# Patient Record
Sex: MALE | Race: WHITE | NOT HISPANIC OR LATINO | Employment: OTHER | URBAN - METROPOLITAN AREA
[De-identification: names, ages, dates, MRNs, and addresses within clinical notes are randomized per-mention and may not be internally consistent; named-entity substitution may affect disease eponyms.]

---

## 2017-01-04 NOTE — TELEPHONE ENCOUNTER
Refused Prescriptions:                       Disp   Refills    PROCTOZONE-HC 2.5 % cream [Pharmacy Med Na*28 g   2        Sig: APPLY TO ANUS TWO TIMES DAILY  Refused By: ALIYAH HERNANDEZ  Reason for Refusal: Appt required, please call patient    Duplicate see refill encounter on 1-3-2016  Machelle  [phone] (home) [phone] (work)

## 2017-06-21 ENCOUNTER — OFFICE VISIT (OUTPATIENT)
Dept: FAMILY MEDICINE | Facility: CLINIC | Age: 67
End: 2017-06-21

## 2017-06-21 VITALS
DIASTOLIC BLOOD PRESSURE: 62 MMHG | HEART RATE: 55 BPM | TEMPERATURE: 97.9 F | OXYGEN SATURATION: 98 % | WEIGHT: 160.2 LBS | SYSTOLIC BLOOD PRESSURE: 100 MMHG | HEIGHT: 69 IN | BODY MASS INDEX: 23.73 KG/M2

## 2017-06-21 DIAGNOSIS — R30.0 DYSURIA: Primary | ICD-10-CM

## 2017-06-21 DIAGNOSIS — K62.89 ANAL PAIN: ICD-10-CM

## 2017-06-21 LAB
ALBUMIN (URINE): NORMAL MG/DL
APPEARANCE UR: CLEAR
BACTERIA, UR: NORMAL
BILIRUB UR QL: NORMAL
CASTS/LPF: NORMAL
COLOR UR: YELLOW
EP/HPF: NORMAL
GLUCOSE URINE: NORMAL MG/DL
HGB UR QL: NORMAL
KETONES UR QL: NORMAL MG/DL
LEUKOCYTE ESTERASE - QUEST: NORMAL
MISC.: NORMAL
NITRITE UR QL STRIP: NORMAL
PH UR STRIP: 5.5 PH (ref 5–7)
RBC, UR MICRO: NORMAL (ref ?–2)
SP. GRAVITY: 1.02
UROBILINOGEN UR QL STRIP: 0.2 EU/DL (ref 0.2–1)
WBC, UR MICRO: NORMAL (ref ?–2)

## 2017-06-21 PROCEDURE — 84153 ASSAY OF PSA TOTAL: CPT | Mod: 90 | Performed by: FAMILY MEDICINE

## 2017-06-21 PROCEDURE — 36415 COLL VENOUS BLD VENIPUNCTURE: CPT | Performed by: FAMILY MEDICINE

## 2017-06-21 PROCEDURE — 99213 OFFICE O/P EST LOW 20 MIN: CPT | Performed by: FAMILY MEDICINE

## 2017-06-21 PROCEDURE — 81001 URINALYSIS AUTO W/SCOPE: CPT | Performed by: FAMILY MEDICINE

## 2017-06-21 NOTE — PROGRESS NOTES
SUBJECTIVE: Sly Morton is a 66 year old male who complains of very slight urinary frequency and intermittent dysuria x 3 weeks, without flank pain, fever, chills, or abnormal penile discharge or bleeding. Pain seems right at head of penis    Patient Active Problem List   Diagnosis     Sciatica     Family history of diabetes mellitus     CATARACT NEC Right      Health Care Home     ACP (advance care planning)     Coronary artery disease involving native coronary artery of native heart without angina pectoris     Cardiomyopathy, ischemic     Hyperlipidemia     Mitral valve disorder     ST elevation myocardial infarction involving left anterior descending (LAD) coronary artery (H)     Past Medical History:   Diagnosis Date     Cardiomyopathy, ischemic      Coronary artery disease      Family history of diabetes mellitus     Father     Hyperlipidaemia      Mitral valve disorders     mitral regurgitation     Old myocardial infarction 5/04    anterior     Family History   Problem Relation Age of Onset     DIABETES Father      C.A.D. Father      Cancer - colorectal No family hx of      Prostate Cancer No family hx of      Social History     Social History     Marital status:      Spouse name: Yoni     Number of children: 2     Years of education: 18     Occupational History     [employer]     Social History Main Topics     Smoking status: Never Smoker     Smokeless tobacco: Never Used     Alcohol use No     Drug use: No     Sexual activity: Yes     Partners: Female     Other Topics Concern     Caffeine Concern Yes     2-4 cups daily     Special Diet Yes     low fat, low cholesterol     Exercise Yes     90 mins daily     Seat Belt Yes     Social History Narrative     Past Surgical History:   Procedure Laterality Date     C EYE EXAM ESTABLISHED PT  2003     C REPAIR DETACH RETINA,SCLERAL BUCKLE  1967    Right eye     HC KNEE SCOPE, DIAGNOSTIC  8/1/2011    Right knee arthroscopy with  partial medial meniscectomy     HEART CATH, ANGIOPLASTY  May 2004     intracoronary stent placement of mid LAD May 2004,     HEART CATH, ANGIOPLASTY  June 2004    elective staged stent of OM        Current Outpatient Prescriptions on File Prior to Visit:  atorvastatin (LIPITOR) 10 MG tablet Take 1 tablet (10 mg) by mouth daily   lisinopril (PRINIVIL,ZESTRIL) 5 MG tablet Take 1 tablet (5 mg) by mouth daily   ASPIRIN 81 MG OR TABS 1 TABLET DAILY   latanoprost (XALATAN) 0.005 % ophthalmic solution Place 1 drop into the right eye At Bedtime      No current facility-administered medications on file prior to visit.      Allergies: No known allergies    Immunization History   Administered Date(s) Administered     TD (ADULT, 7+) 08/03/1999, 03/26/2007     TDAP Vaccine (Boostrix) 08/13/2012        OBJECTIVE: Appears well, in no apparent distress.  Vital signs are normal. The abdomen is soft without tenderness, guarding, mass, rebound or organomegaly. No CVA tenderness or inguinal adenopathy noted. Urine dipstick shows negative for all components.  Micro exam: negative for WBC's or RBC's.     Genitals normal; both testes normal without tenderness, masses, hydroceles, varicoceles, erythema or swelling. Shaft normal, uncircumcised, meatus normal without discharge. No inguinal hernia noted. No inguinal lymphadenopathy.      ASSESSMENT:    Dysuria-normal exam, normal UA- may be urethral irritation     PLAN: observe for now, check PSA Call or return to clinic prn if these symptoms worsen or fail to improve as anticipated.

## 2017-06-21 NOTE — NURSING NOTE
Sly Morton is here today for pain in his penis and burning when he urinates.    Pre-Visit Screening :  Immunizations : up to date  Colon Screening : is due and to be scheduled by patient for later completion  Asthma Action Test/Plan : NA  PHQ9/GAD7 :  NA  Pulse - regular  My Chart - declines    CLASSIFICATION OF OVERWEIGHT AND OBESITY BY BMI                         Obesity Class           BMI(kg/m2)  Underweight                                    < 18.5  Normal                                         18.5-24.9  Overweight                                     25.0-29.9  OBESITY                     I                  30.0-34.9                              II                 35.0-39.9  EXTREME OBESITY             III                >40                             Patient's  BMI Body mass index is 23.66 kg/(m^2).  http://hin.nhlbi.nih.gov/menuplanner/menu.cgi  Questioned patient about current smoking habits.  Pt. has never smoked.  Shannen Mccoy, CMA

## 2017-06-21 NOTE — LETTER
[facility]  [facility] [address]  [place]  [address]    June 22, 2017            Sly Morton  [address] [address]                  Dear Sly Morton      LAB RESULTS:     The results of your recent PSA were NORMAL.  If you have any further questions or problems, please contact our office at [phone].          Eriberto Shetty MD

## 2017-06-21 NOTE — MR AVS SNAPSHOT
After Visit Summary   6/21/2017    Sly Morton    MRN: [id number]           Patient Information     Date Of Birth          1950        Visit Information        Provider Department      6/21/2017 12:15 PM Eriberto Shetty MD [facility]        Today's Diagnoses     Dysuria    -  1    Anal pain           Follow-ups after your visit        Your next 10 appointments already scheduled     Aug 08, 2017  7:45 AM CDT   LAB with RU LAB   [facility] ([facility])    [address]  [facility] [id number] [phone]           Patient must bring picture ID.  Patient should be prepared to give a urine specimen  Please do not eat 10-12 hours before your appointment if you are coming in fasting for labs on lipids, cholesterol, or glucose (sugar).  Pregnant women should follow their Care Team instructions. Water with medications is okay. Do not drink coffee or other fluids.   If you have concerns about taking  your medications, please ask at office or if scheduling via [employer], send a message by clicking on Secure Messaging, Message Your Care Team.            Aug 08, 2017  8:10 AM CDT   Return Visit with Delvin Dougherty PA-C   [facility] ([facility])    [address]  [facility] [id number] [phone]              Who to contact     If you have questions or need follow up information about today's clinic visit or your schedule please contact [facility] directly at [phone].  Normal or non-critical lab and imaging results will be communicated to you by MyChart, letter or phone within 4 business days after the clinic has received the results. If you do not hear from us within 7 days, please contact the clinic through MyChart or phone. If you have a critical or abnormal lab result, we will notify you by phone as soon  "as possible.  Submit refill requests through [employer] or call your pharmacy and they will forward the refill request to us. Please allow 3 business days for your refill to be completed.          Additional Information About Your Visit        Care EveryWhere ID     This is your Care EveryWhere ID. This could be used by other organizations to access your [place] medical records  [id number]        Your Vitals Were     Pulse Temperature Height Pulse Oximetry BMI (Body Mass Index)       55 97.9  F (36.6  C) (Oral) 1.753 m (5' 9\") 98% 23.66 kg/m2        Blood Pressure from Last 3 Encounters:   06/21/17 100/62   08/22/16 116/68   02/03/16 120/80    Weight from Last 3 Encounters:   06/21/17 72.7 kg (160 lb 3.2 oz)   08/22/16 72 kg (158 lb 12.8 oz)   08/11/15 69.9 kg (154 lb)              We Performed the Following     HCL PSA, SCREENING ([facility])     HCL URINALYSIS, ROUTINE     VENOUS COLLECTION          Today's Medication Changes          These changes are accurate as of: 6/21/17 12:52 PM.  If you have any questions, ask your nurse or doctor.               Start taking these medicines.        Dose/Directions    hydrocortisone 2.5 % cream   Commonly known as:  ANUSOL-HC   Used for:  Anal pain   Started by:  Eriberto Shetty MD        Apply to anus BID prn   Quantity:  30 g   Refills:  3            Where to get your medicines      These medications were sent to [id number] [place] [id number]     Phone:  [phone]     hydrocortisone 2.5 % cream                Primary Care Provider Office Phone # Fax #    Eriberto Shetty [phone]       [facility] [address] [facility] [address]  [facility] [address]        Equal Access to Services     BHARATHI JIMENEZ AH: Hadii aad ku hadasho Soomaali, waaxda luqadaha, qaybta kaalmada adeegyada, abhay orielly. So wa " [phone].    ATENCIÓN: Si mitch arellano, tiene a maldonado disposición servicios gratuitos de asistencia lingüística. Clarita erickson [phone].    We comply with applicable federal civil rights laws and [place] laws. We do not discriminate on the basis of race, color, national origin, age, disability sex, sexual orientation or gender identity.            Thank you!     Thank you for choosing [facility]  for your care. Our goal is always to provide you with excellent care. Hearing back from our patients is one way we can continue to improve our services. Please take a few minutes to complete the written survey that you may receive in the mail after your visit with us. Thank you!             Your Updated Medication List - Protect others around you: Learn how to safely use, store and throw away your medicines at www.disposemymeds.org.          This list is accurate as of: 6/21/17 12:52 PM.  Always use your most recent med list.                   Brand Name Dispense Instructions for use Diagnosis    aspirin 81 MG tablet      1 TABLET DAILY    Fever, unspecified       atorvastatin 10 MG tablet    LIPITOR    90 tablet    Take 1 tablet (10 mg) by mouth daily    Coronary artery disease involving native coronary artery of native heart without angina pectoris       hydrocortisone 2.5 % cream    ANUSOL-HC    30 g    Apply to anus BID prn    Anal pain       latanoprost 0.005 % ophthalmic solution    XALATAN     Place 1 drop into the right eye At Bedtime        lisinopril 5 MG tablet    PRINIVIL/ZESTRIL    90 tablet    Take 1 tablet (5 mg) by mouth daily    Coronary artery disease involving native coronary artery of native heart without angina pectoris

## 2017-06-22 LAB — ABBOTT PSA - QUEST: 0.7 NG/ML

## 2017-08-08 ENCOUNTER — OFFICE VISIT (OUTPATIENT)
Dept: CARDIOLOGY | Facility: CLINIC | Age: 67
End: 2017-08-08
Attending: INTERNAL MEDICINE
Payer: COMMERCIAL

## 2017-08-08 VITALS
OXYGEN SATURATION: 98 % | HEART RATE: 54 BPM | BODY MASS INDEX: 24.51 KG/M2 | SYSTOLIC BLOOD PRESSURE: 110 MMHG | HEIGHT: 69 IN | DIASTOLIC BLOOD PRESSURE: 68 MMHG | WEIGHT: 165.5 LBS

## 2017-08-08 DIAGNOSIS — I25.5 CARDIOMYOPATHY, ISCHEMIC: ICD-10-CM

## 2017-08-08 LAB
ANION GAP SERPL CALCULATED.3IONS-SCNC: 4 MMOL/L (ref 3–14)
BUN SERPL-MCNC: 17 MG/DL (ref 7–30)
CALCIUM SERPL-MCNC: 8.6 MG/DL (ref 8.5–10.1)
CHLORIDE SERPL-SCNC: 105 MMOL/L (ref 94–109)
CHOLEST SERPL-MCNC: 128 MG/DL
CO2 SERPL-SCNC: 30 MMOL/L (ref 20–32)
CREAT SERPL-MCNC: 0.82 MG/DL (ref 0.66–1.25)
GFR SERPL CREATININE-BSD FRML MDRD: NORMAL ML/MIN/1.7M2
GLUCOSE SERPL-MCNC: 92 MG/DL (ref 70–99)
HDLC SERPL-MCNC: 65 MG/DL
LDLC SERPL CALC-MCNC: 54 MG/DL
NONHDLC SERPL-MCNC: 63 MG/DL
POTASSIUM SERPL-SCNC: 4.5 MMOL/L (ref 3.4–5.3)
SODIUM SERPL-SCNC: 139 MMOL/L (ref 133–144)
TRIGL SERPL-MCNC: 46 MG/DL

## 2017-08-08 PROCEDURE — 80048 BASIC METABOLIC PNL TOTAL CA: CPT | Performed by: INTERNAL MEDICINE

## 2017-08-08 PROCEDURE — 36415 COLL VENOUS BLD VENIPUNCTURE: CPT | Performed by: INTERNAL MEDICINE

## 2017-08-08 PROCEDURE — 80061 LIPID PANEL: CPT | Performed by: INTERNAL MEDICINE

## 2017-08-08 PROCEDURE — 99214 OFFICE O/P EST MOD 30 MIN: CPT | Performed by: PHYSICIAN ASSISTANT

## 2017-08-08 NOTE — PROGRESS NOTES
HISTORY OF PRESENT ILLNESS:  I had the pleasure of meeting Sly Morton today in the Cardiology Clinic for followup.  Sly is a very pleasant, 66-year-old gentleman who is a patient of Dr. Mensah.  He is an  in the [employer].      Sly is a very pleasant, 66-year-old man who has a history of coronary artery disease dating back to 2004 when he presented with an anterior wall myocardial infarction.  He underwent stenting of his mid LAD.  In a staged fashion, he underwent stenting of the OM.  Ejection fraction was 40% with 2-3+ mitral regurgitation.  Most recent echocardiogram 08/15/2016 showed ejection fraction of 40%-45% with trace to mild mitral regurgitation.  His last evaluation of his coronary anatomy was with a stress nuclear scan in 08/2012.  This demonstrated a small fixed anteroapical defect consistent with a nontransmural infarct, and there was inferior diaphragmatic attenuation.  Ejection fraction was estimated to be 47%.      Sly was last seen by Dr. Mensah on 08/22/2016.  At that time, he was doing very well.  It was noted his LDL was trending upward at that time.      Sly presents today for his annual followup.  He tells me he continues to do very well from a cardiac standpoint without any chest pain, shortness of breath, edema, lightheadedness, dizziness, syncope.  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  He says that in the school year, he walks approximately 20,000 steps per day.  In the summer, he is less active.  He notes he has put on about 5 pounds over the summer due to this.  He works hard on a healthy diet, though does allow some treats and snacks at some times.      Sly was seen by his Primary Care provider this summer.      PHYSICAL EXAMINATION:   VITAL SIGNS:   Blood pressure 110/68, pulse 54, weight 165 pounds 8 ounces, BMI 24.49.   GENERAL:  A 66-year-old gentleman, thin, in no apparent distress.   NECK:  Carotid pulses full and equal.  No bruit.  JVP is normal.   LUNGS:  Clear.   CARDIAC:  Regular S1, S2.  No significant murmur is appreciated.   ABDOMEN:  Thin, soft.  Bowel sounds positive and nontender.   EXTREMITIES:  Warm without pitting edema.   NEUROLOGIC:  Alert and oriented x3.  No focal deficits.      DIAGNOSTICS:      1.  08/08/2017 fasting lipid panel:  Total cholesterol 128, HDL 65, LDL 54, triglycerides 46.   2.  08/08/2017 basic metabolic panel:  Sodium 139, potassium 4.5, chloride 105, carbon dioxide 30, anion gap 4, glucose 92, BUN 17, creatinine 0.82.      ASSESSMENT AND PLAN:  Sly is a very pleasant, 66-year-old gentleman with a history of coronary artery disease and cardiomyopathy.  He also has a history of mitral regurgitation, though this has improved from the most recent echocardiogram in 2016.      Sly continues to do very well from a cardiac standpoint without any clinical evidence of ischemia, heart failure or significant arrhythmia.      Repeat fasting lipid panel today shows that his LDL is down from last year, and his HDL is up from last year.  Despite these numbers, his weight is up about 5 pounds over the summer.  He attributes this to decrease in activity and some increased snacks and treats over the summer months.  He plans to get back to his normal routine once the school year starts.        At this time, we will make no medication changes.      Follow up in 1 year with Dr. Mensah with repeat fasting lipid panel, basic metabolic panel and echocardiogram.        He was seen in the Primary Care Clinic this summer and declines refills at this time.      Thank you very much for allowing me to participate in the care of Sly Morton.         JESSICA LINK PA-C             D: 08/08/2017 08:47   T: 08/08/2017 10:17   MT: [email or web address]      Name:      LENA MCGOWAN   MRN:      [id number]        Account:      [id number]   :      1950           Service Date: 2017      Document: [id number]

## 2017-08-08 NOTE — LETTER
8/8/2017    Eriberto Shetty MD  [address] [facility] [address]  [place] [address]      RE: Sly PANCHITO Praveen       Dear Colleague,    I had the pleasure of seeing Sly Morton in the [facility].    I had the pleasure of meeting Sly Morton today in the Cardiology Clinic for followup.  Sly is a very pleasant, 66-year-old gentleman who is a patient of Dr. Mensah.  He is an  in the [employer].      Sly is a very pleasant, 66-year-old man who has a history of coronary artery disease dating back to 2004 when he presented with an anterior wall myocardial infarction.  He underwent stenting of his mid LAD.  In a staged fashion, he underwent stenting of the OM.  Ejection fraction was 40% with 2-3+ mitral regurgitation.  Most recent echocardiogram 08/15/2016 showed ejection fraction of 40%-45% with trace to mild mitral regurgitation.  His last evaluation of his coronary anatomy was with a stress nuclear scan in 08/2012.  This demonstrated a small fixed anteroapical defect consistent with a nontransmural infarct, and there was inferior diaphragmatic attenuation.  Ejection fraction was estimated to be 47%.      Sly was last seen by Dr. Mensah on 08/22/2016.  At that time, he was doing very well.  It was noted his LDL was trending upward at that time.      Sly presents today for his annual followup.  He tells me he continues to do very well from a cardiac standpoint without any chest pain, shortness of breath, edema, lightheadedness, dizziness, syncope.  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  He says that in the school year, he walks approximately 20,000 steps per day.  In the summer, he is less active.  He notes he has put on about 5 pounds over the summer  due to this.  He works hard on a healthy diet, though does allow some treats and snacks at some times.      Sly was seen by his Primary Care provider this summer.      PHYSICAL EXAMINATION:   VITAL SIGNS:  Blood pressure 110/68, pulse 54, weight 165 pounds 8 ounces, BMI 24.49.   GENERAL:  A 66-year-old gentleman, thin, in no apparent distress.   NECK:  Carotid pulses full and equal.  No bruit.  JVP is normal.   LUNGS:  Clear.   CARDIAC:  Regular S1, S2.  No significant murmur is appreciated.   ABDOMEN:  Thin, soft.  Bowel sounds positive and nontender.   EXTREMITIES:  Warm without pitting edema.   NEUROLOGIC:  Alert and oriented x3.  No focal deficits.      DIAGNOSTICS:      1.  08/08/2017 fasting lipid panel:  Total cholesterol 128, HDL 65, LDL 54, triglycerides 46.   2.  08/08/2017 basic metabolic panel:  Sodium 139, potassium 4.5, chloride 105, carbon dioxide 30, anion gap 4, glucose 92, BUN 17, creatinine 0.82.     Outpatient Encounter Prescriptions as of 8/8/2017   Medication Sig Dispense Refill     Psyllium (METAMUCIL FIBER PO)        hydrocortisone (ANUSOL-HC) 2.5 % cream Apply to anus BID prn 30 g 3     [DISCONTINUED] atorvastatin (LIPITOR) 10 MG tablet Take 1 tablet (10 mg) by mouth daily 90 tablet 3     [DISCONTINUED] lisinopril (PRINIVIL,ZESTRIL) 5 MG tablet Take 1 tablet (5 mg) by mouth daily 90 tablet 3     latanoprost (XALATAN) 0.005 % ophthalmic solution Place 1 drop into the right eye At Bedtime   5     ASPIRIN 81 MG OR TABS 1 TABLET DAILY       No facility-administered encounter medications on file as of 8/8/2017.       ASSESSMENT AND PLAN:  Sly is a very pleasant, 66-year-old gentleman with a history of coronary artery disease and cardiomyopathy.  He also has a history of mitral regurgitation, though this has improved from the most recent echocardiogram in 2016.      Sly continues to do very well from a cardiac standpoint without any clinical evidence of ischemia, heart failure or  significant arrhythmia.      Repeat fasting lipid panel today shows that his LDL is down from last year, and his HDL is up from last year.  Despite these numbers, his weight is up about 5 pounds over the summer.  He attributes this to decrease in activity and some increased snacks and treats over the summer months.  He plans to get back to his normal routine once the school year starts.        At this time, we will make no medication changes.      Follow up in 1 year with Dr. Mensah with repeat fasting lipid panel, basic metabolic panel and echocardiogram.        He was seen in the Primary Care Clinic this summer and declines refills at this time.      Thank you very much for allowing me to participate in the care of Sly Morton.      Sincerely,    Delvin Dougherty PA-C     [facility]

## 2017-08-08 NOTE — PATIENT INSTRUCTIONS
1.  Cholesterol panel looks improved from last year. Work on dietary changes and increasing exercise with your weight increase over the past 2 months. Follow up with Dr. Mensah in 1 year with repeat labs and an echocardiogram.

## 2017-08-08 NOTE — PROGRESS NOTES
HPI and Plan:   See dictation  [id number]    Orders this Visit:  No orders of the defined types were placed in this encounter.    Orders Placed This Encounter   Medications     Psyllium (METAMUCIL FIBER PO)     There are no discontinued medications.      Encounter Diagnosis   Name Primary?     Cardiomyopathy, ischemic        CURRENT MEDICATIONS:  Current Outpatient Prescriptions   Medication Sig Dispense Refill     Psyllium (METAMUCIL FIBER PO)        hydrocortisone (ANUSOL-HC) 2.5 % cream Apply to anus BID prn 30 g 3     atorvastatin (LIPITOR) 10 MG tablet Take 1 tablet (10 mg) by mouth daily 90 tablet 3     lisinopril (PRINIVIL,ZESTRIL) 5 MG tablet Take 1 tablet (5 mg) by mouth daily 90 tablet 3     latanoprost (XALATAN) 0.005 % ophthalmic solution Place 1 drop into the right eye At Bedtime   5     ASPIRIN 81 MG OR TABS 1 TABLET DAILY         ALLERGIES  Allergies   Allergen Reactions     Cats      No Known Allergies        PAST MEDICAL HISTORY:  Past Medical History:   Diagnosis Date     Cardiomyopathy, ischemic      Coronary artery disease      Family history of diabetes mellitus     Father     Hyperlipidaemia      Mitral valve disorder     mitral regurgitation     Old myocardial infarction 5/04    anterior       PAST SURGICAL HISTORY:  Past Surgical History:   Procedure Laterality Date     C EYE EXAM ESTABLISHED PT  2003     C REPAIR DETACH RETINA,SCLERAL BUCKLE  1967    Right eye     HC KNEE SCOPE, DIAGNOSTIC  8/1/2011    Right knee arthroscopy with partial medial meniscectomy     HEART CATH, ANGIOPLASTY  May 2004     intracoronary stent placement of mid LAD May 2004,     HEART CATH, ANGIOPLASTY  June 2004    elective staged stent of OM        FAMILY HISTORY:  Family History   Problem Relation Age of Onset     DIABETES Father      C.A.D. Father      Cancer - colorectal No family hx of      Prostate Cancer No family hx of        SOCIAL HISTORY:  Social History     Social History     Marital status:       "Spouse name: Yoni     Number of children: 2     Years of education: 18     Occupational History     [employer]     Social History Main Topics     Smoking status: Never Smoker     Smokeless tobacco: Never Used     Alcohol use No     Drug use: No     Sexual activity: Yes     Partners: Female     Other Topics Concern     Caffeine Concern Yes     2-4 cups daily     Special Diet Yes     low fat, low cholesterol     Exercise Yes     90 mins daily     Seat Belt Yes     Social History Narrative       Review of Systems:  Skin:  Negative     Eyes:  Positive for glasses;glaucoma  ENT:  Positive for    Respiratory:  Positive for dyspnea on exertion  Cardiovascular:    Positive for;lightheadedness  Gastroenterology: Negative    Genitourinary:  Positive for    Musculoskeletal:  Positive for joint pain;nocturnal cramping  Neurologic:  Negative    Psychiatric:  Negative    Heme/Lymph/Imm:  Negative    Endocrine:  Negative        Physical Exam:  Vitals: /68 (BP Location: Right arm, Patient Position: Chair, Cuff Size: Adult Regular)  Pulse 54  Ht 1.753 m (5' 9\")  Wt 75.1 kg (165 lb 8 oz)  SpO2 98%  BMI 24.44 kg/m2    Constitutional:  cooperative, alert and oriented, well developed, well nourished, in no acute distress thin      Skin:  warm and dry to the touch, no apparent skin lesions or masses noted        Head:  normocephalic, no masses or lesions        Eyes:  pupils equal and round;conjunctivae and lids unremarkable;sclera white;no xanthalasma        ENT:  no pallor or cyanosis, dentition good        Neck:  carotid pulses are full and equal bilaterally;no carotid bruit;JVP normal        Chest:  normal breath sounds, clear to auscultation, normal A-P diameter, normal symmetry, normal respiratory excursion, no use of accessory muscles        Cardiac: regular rhythm;no murmurs, gallops or rubs detected;normal S1 and S2                  Abdomen:  abdomen soft;BS normoactive;non-tender    "     Vascular: pulses full and equal                                      Extremities and Back:  no edema;no spinal abnormalities noted;normal muscle strength and tone        Neurological:  affect appropriate, oriented to time, person and place;no gross motor deficits          Recent Lab Results:  LIPID RESULTS:  Lab Results   Component Value Date    CHOL 128 08/08/2017    HDL 65 08/08/2017    LDL 54 08/08/2017    TRIG 46 08/08/2017    CHOLHDLRATIO 2.0 08/04/2015       LIVER ENZYME RESULTS:  Lab Results   Component Value Date    AST 19 08/20/2003    ALT 22 08/01/2012       CBC RESULTS:  Lab Results   Component Value Date    WBC 7.5 03/12/2009    RBC 3.81 (A) 03/12/2009    HGB 13.6 (A) 07/27/2011    HCT 35.2 (A) 03/12/2009    MCV 92.4 03/12/2009    MCH 32.1 03/12/2009    MCHC 34.7 03/12/2009     03/12/2009       BMP RESULTS:  Lab Results   Component Value Date     08/08/2017    POTASSIUM 4.5 08/08/2017    CHLORIDE 105 08/08/2017    CO2 30 08/08/2017    ANIONGAP 4 08/08/2017    GLC 92 08/08/2017    BUN 17 08/08/2017    CR 0.82 08/08/2017    GFRESTIMATED >90  Non  GFR Calc   08/08/2017    GFRESTBLACK >90   GFR Calc   08/08/2017    SUZY 8.6 08/08/2017        A1C RESULTS:  No results found for: A1C    INR RESULTS:  Lab Results   Component Value Date    INR 0.92 05/28/2004           CC  David Mensah MD  [facility]  [address]  [place] [address]

## 2017-08-08 NOTE — MR AVS SNAPSHOT
"              After Visit Summary   8/8/2017    Sly Morton    MRN: [id number]           Patient Information     Date Of Birth          1950        Visit Information        Provider Department      8/8/2017 8:10 AM Delvin Dougherty PA-C [facility] [place]        Today's Diagnoses     Cardiomyopathy, ischemic          Care Instructions    1.  Cholesterol panel looks improved from last year. Work on dietary changes and increasing exercise with your weight increase over the past 2 months. Follow up with Dr. Mensah in 1 year with repeat labs and an echocardiogram.           Follow-ups after your visit        Who to contact     If you have questions or need follow up information about today's clinic visit or your schedule please contact [facility] directly at [phone].  Normal or non-critical lab and imaging results will be communicated to you by MyChart, letter or phone within 4 business days after the clinic has received the results. If you do not hear from us within 7 days, please contact the clinic through MyChart or phone. If you have a critical or abnormal lab result, we will notify you by phone as soon as possible.  Submit refill requests through [employer] or call your pharmacy and they will forward the refill request to us. Please allow 3 business days for your refill to be completed.          Additional Information About Your Visit        Care EveryWhere ID     This is your Care EveryWhere ID. This could be used by other organizations to access your [place] medical records  [id number]        Your Vitals Were     Pulse Height Pulse Oximetry BMI (Body Mass Index)          54 1.753 m (5' 9\") 98% 24.44 kg/m2         Blood Pressure from Last 3 Encounters:   08/08/17 110/68   06/21/17 100/62   08/22/16 116/68    Weight from Last 3 Encounters:   08/08/17 75.1 kg (165 lb 8 oz)   06/21/17 72.7 kg (160 lb 3.2 oz)   08/22/16 72 kg " (158 lb 12.8 oz)              We Performed the Following     Follow-Up with Cardiac Advanced Practice Provider        Primary Care Provider Office Phone # Fax #    Eriberto Shetty [phone]       [facility] [address] [facility] [address]  [facility] [address]        Equal Access to Services     FABYSOLANGE TONY : Hadii aad ku hadasho Soomaali, waaxda luqadaha, qaybta kaalmada adeegyada, waxay idiin hayaan adeeg khnabeelsh la'aan ah. So [facility] [phone].    ATENCIÓN: Si habla español, tiene a maldonado disposición servicios gratuitos de asistencia lingüística. [facility] [phone].    We comply with applicable federal civil rights laws and [place] laws. We do not discriminate on the basis of race, color, national origin, age, disability sex, sexual orientation or gender identity.            Thank you!     Thank you for choosing [facility] [place]  for your care. Our goal is always to provide you with excellent care. Hearing back from our patients is one way we can continue to improve our services. Please take a few minutes to complete the written survey that you may receive in the mail after your visit with us. Thank you!             Your Updated Medication List - Protect others around you: Learn how to safely use, store and throw away your medicines at www.disposemymeds.org.          This list is accurate as of: 8/8/17  8:37 AM.  Always use your most recent med list.                   Brand Name Dispense Instructions for use Diagnosis    aspirin 81 MG tablet      1 TABLET DAILY    Fever, unspecified       atorvastatin 10 MG tablet    LIPITOR    90 tablet    Take 1 tablet (10 mg) by mouth daily    Coronary artery disease involving native coronary artery of native heart without angina pectoris       hydrocortisone 2.5 % cream    ANUSOL-HC    30 g    Apply to anus BID prn    Anal pain       latanoprost 0.005 % ophthalmic solution    XALATAN     Place 1  drop into the right eye At Bedtime        lisinopril 5 MG tablet    PRINIVIL/ZESTRIL    90 tablet    Take 1 tablet (5 mg) by mouth daily    Coronary artery disease involving native coronary artery of native heart without angina pectoris       METAMUCIL FIBER PO

## 2017-09-06 DIAGNOSIS — I25.10 CORONARY ARTERY DISEASE INVOLVING NATIVE CORONARY ARTERY OF NATIVE HEART WITHOUT ANGINA PECTORIS: ICD-10-CM

## 2017-09-06 RX ORDER — LISINOPRIL 5 MG/1
5 TABLET ORAL DAILY
Qty: 90 TABLET | Refills: 3 | Status: SHIPPED | OUTPATIENT
Start: 2017-09-06 | End: 2018-07-26

## 2017-09-06 RX ORDER — ATORVASTATIN CALCIUM 10 MG/1
10 TABLET, FILM COATED ORAL DAILY
Qty: 90 TABLET | Refills: 3 | Status: SHIPPED | OUTPATIENT
Start: 2017-09-06 | End: 2018-07-26

## 2018-05-21 ENCOUNTER — TELEPHONE (OUTPATIENT)
Dept: CARDIOLOGY | Facility: CLINIC | Age: 68
End: 2018-05-21

## 2018-05-21 NOTE — TELEPHONE ENCOUNTER
Patient called requesting an annual OV with Dr. Mensah.  No appointments available.  Patient transferred to scheduling for next available SELENA OV.  Patient states he is retiring the end of August and needs to be seen before Sept 1.

## 2018-07-26 ENCOUNTER — OFFICE VISIT (OUTPATIENT)
Dept: FAMILY MEDICINE | Facility: CLINIC | Age: 68
End: 2018-07-26

## 2018-07-26 VITALS
SYSTOLIC BLOOD PRESSURE: 110 MMHG | BODY MASS INDEX: 24.56 KG/M2 | HEART RATE: 48 BPM | HEIGHT: 69 IN | WEIGHT: 165.8 LBS | RESPIRATION RATE: 20 BRPM | DIASTOLIC BLOOD PRESSURE: 62 MMHG | TEMPERATURE: 98.2 F

## 2018-07-26 DIAGNOSIS — I25.10 CORONARY ARTERY DISEASE INVOLVING NATIVE CORONARY ARTERY OF NATIVE HEART WITHOUT ANGINA PECTORIS: ICD-10-CM

## 2018-07-26 DIAGNOSIS — Z83.3 FAMILY HISTORY OF DIABETES MELLITUS: ICD-10-CM

## 2018-07-26 DIAGNOSIS — Z12.11 SCREENING FOR COLON CANCER: ICD-10-CM

## 2018-07-26 DIAGNOSIS — Z00.00 ROUTINE GENERAL MEDICAL EXAMINATION AT A HEALTH CARE FACILITY: Primary | ICD-10-CM

## 2018-07-26 DIAGNOSIS — Z23 NEED FOR VACCINATION: ICD-10-CM

## 2018-07-26 LAB — HBA1C MFR BLD: 5.3 % (ref 4–7)

## 2018-07-26 PROCEDURE — 80053 COMPREHEN METABOLIC PANEL: CPT | Mod: 90 | Performed by: FAMILY MEDICINE

## 2018-07-26 PROCEDURE — 90670 PCV13 VACCINE IM: CPT | Performed by: FAMILY MEDICINE

## 2018-07-26 PROCEDURE — 83036 HEMOGLOBIN GLYCOSYLATED A1C: CPT | Performed by: FAMILY MEDICINE

## 2018-07-26 PROCEDURE — 99397 PER PM REEVAL EST PAT 65+ YR: CPT | Mod: 25 | Performed by: FAMILY MEDICINE

## 2018-07-26 PROCEDURE — 36415 COLL VENOUS BLD VENIPUNCTURE: CPT | Performed by: FAMILY MEDICINE

## 2018-07-26 PROCEDURE — 90471 IMMUNIZATION ADMIN: CPT | Performed by: FAMILY MEDICINE

## 2018-07-26 PROCEDURE — 84153 ASSAY OF PSA TOTAL: CPT | Mod: 90 | Performed by: FAMILY MEDICINE

## 2018-07-26 PROCEDURE — 86803 HEPATITIS C AB TEST: CPT | Mod: 90 | Performed by: FAMILY MEDICINE

## 2018-07-26 RX ORDER — LISINOPRIL 5 MG/1
5 TABLET ORAL DAILY
Qty: 90 TABLET | Refills: 3 | Status: SHIPPED | OUTPATIENT
Start: 2018-07-26 | End: 2019-08-26

## 2018-07-26 RX ORDER — ATORVASTATIN CALCIUM 10 MG/1
10 TABLET, FILM COATED ORAL DAILY
Qty: 90 TABLET | Refills: 3 | Status: SHIPPED | OUTPATIENT
Start: 2018-07-26 | End: 2019-08-26

## 2018-07-26 NOTE — LETTER
July 30, 2018      Sly Morton  [address] [id number]        Sly Morton     The results of your recent hepatitis C test, Blood Sugar, Hgb A1C  5.3 (desired less than 6.5), Kidney Tests, Lipid profile, Liver Panel and PSA were within normal limits. Looks great!  The results are now released on My Chart. Please contact me if you have further questions or concerns.    Sincerely,    TIM Shetty M.D.    Resulted Orders   Hepatits C antibody (QUEST)   Result Value Ref Range    HCV Antibody NON-REACTIVE NON-REACTIVE    SIGNAL TO CUT OFF - QUEST 0.06 <1.00   HCL PSA, SCREENING (QUEST)   Result Value Ref Range    Abbott PSA 0.6 < OR = 4.0 ng/mL      Comment:      The total PSA value from this assay system is   standardized against the WHO standard. The test   result will be approximately 20% lower when compared   to the equimolar-standardized total PSA (Susie   Grupo). Comparison of serial PSA results should be   interpreted with this fact in mind.     This test was performed using the Siemens   chemiluminescent method. Values obtained from   different assay methods cannot be used  interchangeably. PSA levels, regardless of  value, should not be interpreted as absolute  evidence of the presence or absence of disease.     COMPREHENSIVE METABOLIC PANEL (QUEST) XCMP   Result Value Ref Range    Glucose 102 (H) 65 - 99 mg/dL      Comment:                    Fasting reference interval     For someone without known diabetes, a glucose value  between 100 and 125 mg/dL is consistent with  prediabetes and should be confirmed with a  follow-up test.         Urea Nitrogen 19 7 - 25 mg/dL    Creatinine 0.81 0.70 - 1.25 mg/dL      Comment:      For patients >49 years of age, the reference limit  for Creatinine is approximately 13% higher for people  identified as -American.         GFR Estimate 93 > OR = 60 mL/min/1.73m2    EGFR African American 108 > OR = 60 mL/min/1.73m2     BUN/Creatinine Ratio NOT APPLICABLE 6 - 22 (calc)    Sodium 139 135 - 146 mmol/L    Potassium 4.4 3.5 - 5.3 mmol/L    Chloride 105 98 - 110 mmol/L    Carbon Dioxide 26 20 - 31 mmol/L    Calcium 9.0 8.6 - 10.3 mg/dL    Protein Total 6.3 6.1 - 8.1 g/dL    Albumin 4.1 3.6 - 5.1 g/dL    Globulin Calculated 2.2 1.9 - 3.7 g/dL (calc)    A/G Ratio 1.9 1.0 - 2.5 (calc)    Bilirubin Total 0.4 0.2 - 1.2 mg/dL    Alkaline Phosphatase 42 40 - 115 U/L    AST 20 10 - 35 U/L    ALT 13 9 - 46 U/L   Hemoglobin A1c (BFP)   Result Value Ref Range    Hemoglobin A1C 5.3 4.0 - 7.0 %       If you have any questions or concerns, please call the clinic at the number listed above.       Sincerely,        Eriberto Shetty MD

## 2018-07-26 NOTE — PROGRESS NOTES
SUBJECTIVE:   CC: Sly Morton is an 67 year old male who presents for preventative health visit.     Healthy Habits:    Do you get at least three servings of calcium containing foods daily (dairy, green leafy vegetables, etc.)? yes    Amount of exercise or daily activities, outside of work: 7 day(s) per week    Problems taking medications regularly No    Medication side effects: No    Have you had an eye exam in the past two years? yes    Do you see a dentist twice per year? yes    Do you have sleep apnea, excessive snoring or daytime drowsiness?no       Hyperlipidemia Follow-Up      Rate your low fat/cholesterol diet?: good    Taking statin?  Yes, no muscle aches from statin    Other lipid medications/supplements?:  none    Vascular Disease Follow-up:  Coronary Artery Disease (CAD)      Chest pain or pressure, left side neck or arm pain: No    Shortness of breath/increased sweats/nausea with exertion: No    Pain in calves walking 1-2 blocks: No    Worsened or new symptoms since last visit: occasional lightheadedness    Nitroglycerin use: no    Daily aspirin use: Yes      Today's PHQ-2 Score: No flowsheet data found.    Abuse: Current or Past(Physical, Sexual or Emotional)- No  Do you feel safe in your environment - Yes    Social History   Substance Use Topics     Smoking status: Never Smoker     Smokeless tobacco: Never Used     Alcohol use No      If you drink alcohol do you typically have >3 drinks per day or >7 drinks per week? No                      Last PSA:   Abbott PSA   Date Value Ref Range Status   06/21/2017 0.7 < OR = 4.0 ng/mL Final     Comment:     The total PSA value from this assay system is   standardized against the WHO standard. The test   result will be approximately 20% lower when compared   to the equimolar-standardized total PSA (Susie   Romeo). Comparison of serial PSA results should be   interpreted with this fact in mind.     This test was performed using the Siemens    chemiluminescent method. Values obtained from   different assay methods cannot be used  interchangeably. PSA levels, regardless of  value, should not be interpreted as absolute  evidence of the presence or absence of disease.         Reviewed orders with patient. Reviewed health maintenance and updated orders accordingly - Yes  BP Readings from Last 3 Encounters:   07/26/18 110/62   08/08/17 110/68   06/21/17 100/62    Wt Readings from Last 3 Encounters:   07/26/18 75.2 kg (165 lb 12.8 oz)   08/08/17 75.1 kg (165 lb 8 oz)   06/21/17 72.7 kg (160 lb 3.2 oz)                  Patient Active Problem List   Diagnosis     Sciatica     Family history of diabetes mellitus     CATARACT NEC Right      Health Care Home     ACP (advance care planning)     Coronary artery disease involving native coronary artery of native heart without angina pectoris     Cardiomyopathy, ischemic     Hyperlipidemia     Mitral valve disorder     ST elevation myocardial infarction involving left anterior descending (LAD) coronary artery (H)     Past Surgical History:   Procedure Laterality Date     C EYE EXAM ESTABLISHED PT  2003     C REPAIR DETACH RETINA,SCLERAL BUCKLE  1967    Right eye     HC KNEE SCOPE, DIAGNOSTIC  8/1/2011    Right knee arthroscopy with partial medial meniscectomy     HEART CATH, ANGIOPLASTY  May 2004     intracoronary stent placement of mid LAD May 2004,     HEART CATH, ANGIOPLASTY  June 2004    elective staged stent of OM        Social History   Substance Use Topics     Smoking status: Never Smoker     Smokeless tobacco: Never Used     Alcohol use No     Family History   Problem Relation Age of Onset     Diabetes Father      C.A.D. Father      Cancer - colorectal No family hx of      Prostate Cancer No family hx of          Current Outpatient Prescriptions   Medication Sig Dispense Refill     ASPIRIN 81 MG OR TABS 1 TABLET DAILY       atorvastatin (LIPITOR) 10 MG tablet Take 1 tablet (10 mg) by mouth daily 90 tablet 3      hydrocortisone (ANUSOL-HC) 2.5 % cream Apply to anus BID prn 30 g 3     latanoprost (XALATAN) 0.005 % ophthalmic solution Place 1 drop into the right eye At Bedtime   5     lisinopril (PRINIVIL/ZESTRIL) 5 MG tablet Take 1 tablet (5 mg) by mouth daily 90 tablet 3     Psyllium (METAMUCIL FIBER PO)        [DISCONTINUED] atorvastatin (LIPITOR) 10 MG tablet Take 1 tablet (10 mg) by mouth daily 90 tablet 3     [DISCONTINUED] lisinopril (PRINIVIL/ZESTRIL) 5 MG tablet Take 1 tablet (5 mg) by mouth daily 90 tablet 3     Allergies   Allergen Reactions     Cats      No Known Allergies      Recent Labs   Lab Test  08/08/17   0756  08/15/16   0843  08/04/15   0844  08/07/14   0751   08/01/12   0757   07/19/11   0000  07/13/10   0745   LDL  54  62  53  48   < >  60   --   56*  55   HDL  65  61  60  69   < >  55   --   55  57   TRIG  46  42  40  56   < >  48   --   60  43   ALT   --    --    --    --    --   22   --   17  19   CR  0.82   --    --   0.77   --   0.73   < >   --    --    GFRESTIMATED  >90  Non  GFR Calc     --    --   >90  Non  GFR Calc     --   >90   < >   --    --    GFRESTBLACK  >90   GFR Calc     --    --   >90   GFR Calc     --   >90   < >   --    --    POTASSIUM  4.5   --    --   4.0   --   4.0   < >   --    --     < > = values in this interval not displayed.        Reviewed and updated as needed this visit by clinical staff  Tobacco  Allergies  Meds  Problems         Reviewed and updated as needed this visit by Provider        Past Medical History:   Diagnosis Date     Cardiomyopathy, ischemic      Coronary artery disease      Family history of diabetes mellitus     Father     Hyperlipidaemia      Mitral valve disorders(424.0)     mitral regurgitation     Old myocardial infarction 5/04    anterior      Past Surgical History:   Procedure Laterality Date     C EYE EXAM ESTABLISHED PT  2003     C REPAIR DETACH RETINA,SCLERAL BUCKLE  1967     "Right eye     HC KNEE SCOPE, DIAGNOSTIC  8/1/2011    Right knee arthroscopy with partial medial meniscectomy     HEART CATH, ANGIOPLASTY  May 2004     intracoronary stent placement of mid LAD May 2004,     HEART CATH, ANGIOPLASTY  June 2004    elective staged stent of OM        ROS:  CONSTITUTIONAL: NEGATIVE for fever, chills, change in weight  INTEGUMENTARY/SKIN: NEGATIVE for worrisome rashes, moles or lesions  EYES: NEGATIVE for vision changes or irritation  ENT: NEGATIVE for ear, mouth and throat problems  RESP: NEGATIVE for significant cough or SOB  CV: NEGATIVE for chest pain, palpitations or peripheral edema  GI: NEGATIVE for nausea, abdominal pain, heartburn, or change in bowel habits   male: negative for dysuria, hematuria, decreased urinary stream, erectile dysfunction, urethral discharge  MUSCULOSKELETAL: NEGATIVE for significant arthralgias or myalgia  NEURO: NEGATIVE for weakness, dizziness or paresthesias  ENDOCRINE: NEGATIVE for temperature intolerance, skin/hair changes  HEME/ALLERGY/IMMUNE: NEGATIVE for bleeding problems  PSYCHIATRIC: NEGATIVE for changes in mood or affect    OBJECTIVE:   /62 (BP Location: Left arm, Patient Position: Chair, Cuff Size: Adult Regular)  Pulse (!) 48  Temp 98.2  F (36.8  C) (Oral)  Resp 20  Ht 1.753 m (5' 9\")  Wt 75.2 kg (165 lb 12.8 oz)  BMI 24.48 kg/m2  EXAM:  GENERAL: healthy, alert and no distress  EYES: Eyes grossly normal to inspection, PERRL and conjunctivae and sclerae normal  HENT: ear canals and TM's normal, nose and mouth without ulcers or lesions  NECK: no adenopathy, no asymmetry, masses, or scars and thyroid normal to palpation  RESP: lungs clear to auscultation - no rales, rhonchi or wheezes  CV: regular rate and rhythm, normal S1 S2, no S3 or S4, no murmur, click or rub, no peripheral edema and peripheral pulses strong  ABDOMEN: soft, nontender, no hepatosplenomegaly, no masses and bowel sounds normal   (male): normal male genitalia " "without lesions or urethral discharge, no hernia  RECTAL (male): deferred  MS: no gross musculoskeletal defects noted, no edema  SKIN: no suspicious lesions or rashes  NEURO: Normal strength and tone, mentation intact and speech normal  PSYCH: mentation appears normal, affect normal/bright        ASSESSMENT/PLAN:   (Z00.00) Routine general medical examination at a health care facility  (primary encounter diagnosis)  Comment: discussed preventitive healthcare   Plan: Hepatits C antibody (QUEST), HCL PSA, SCREENING        (QUEST), VENOUS COLLECTION, COMPREHENSIVE         METABOLIC PANEL (QUEST) XCMP        Continue to work on healthy diet and exercise, discussed healthy habits     (Z12.11) Screening for colon cancer  Comment:   Plan: GASTROENTEROLOGY ADULT REF PROCEDURE ONLY Other            (I25.10) Coronary artery disease involving native coronary artery of native heart without angina pectoris  Comment: stable  Plan: atorvastatin (LIPITOR) 10 MG tablet, lisinopril        (PRINIVIL/ZESTRIL) 5 MG tablet        continue current medications at current doses , pt seeing Dr Mensah in August    (Z23) Need for vaccination  Comment:   Plan: Pneumococcal vaccine 13 valent PCV13 IM         (Prevnar) [05952], ADMIN: Vaccine, Initial         (34934)            (Z83.3) Family history of diabetes mellitus  Comment:   Plan: COMPREHENSIVE METABOLIC PANEL (QUEST) XCMP,         Hemoglobin A1c (BFP)              COUNSELING:  Reviewed preventive health counseling, as reflected in patient instructions       Regular exercise       Healthy diet/nutrition       Vision screening       Immunizations    Vaccinated for: Pneumococcal             Colon cancer screening       Prostate cancer screening    BP Readings from Last 1 Encounters:   07/26/18 110/62     Estimated body mass index is 24.48 kg/(m^2) as calculated from the following:    Height as of this encounter: 1.753 m (5' 9\").    Weight as of this encounter: 75.2 kg (165 lb 12.8 " oz).           reports that he has never smoked. He has never used smokeless tobacco.      Counseling Resources:  ATP IV Guidelines  Pooled Cohorts Equation Calculator  FRAX Risk Assessment  ICSI Preventive Guidelines  Dietary Guidelines for Americans, 2010  USDA's MyPlate  ASA Prophylaxis  Lung CA Screening    Eriberto Shetty MD  [facility]

## 2018-07-26 NOTE — NURSING NOTE
Sly PANCHITO Morton is here for a CPX.    Pre-visit planning  Immunizations -up to date  Colonoscopy -is due and ordered today  Mammogram -  Asthma test --  PHQ9 -  ANNE 7 -  Questioned patient about current smoking habits.  Pt. has never smoked.  Body mass index is 24.48 kg/(m^2).  PULSE regular  My Chart:   CLASSIFICATION OF OVERWEIGHT AND OBESITY BY BMI                        Obesity Class           BMI(kg/m2)  Underweight                                    < 18.5  Normal                                         18.5-24.9  Overweight                                     25.0-29.9  OBESITY                     I                  30.0-34.9                             II                 35.0-39.9  EXTREME OBESITY             III                >40                            Patient's  BMI Body mass index is 24.48 kg/(m^2).  Http://hin.nhlbi.nih.gov/menuplanner/menu.cgi  ETOH screening:  Questions:  1-How often do you have a drink containing alcohol?                             0 times per   2-How many drinks containing alcohol do you have on a typical day when you are         Drinking?                                 3- How often do you have 5 or more drinks on one occasion?                               per     Have you ever:  None of the patient's responses to the CAGE screening were positive / Negative CAGE score

## 2018-07-26 NOTE — MR AVS SNAPSHOT
After Visit Summary   7/26/2018    Sly Morton    MRN: [id number]           Patient Information     Date Of Birth          1950        Visit Information        Provider Department      7/26/2018 12:30 PM Eriberto Shetty MD [facility]        Today's Diagnoses     Routine general medical examination at a health care facility    -  1    Screening for colon cancer        Coronary artery disease involving native coronary artery of native heart without angina pectoris        Need for vaccination        Family history of diabetes mellitus           Follow-ups after your visit        Additional Services     GASTROENTEROLOGY ADULT REF PROCEDURE ONLY Other       Last Lab Result: Creatinine (mg/dL)       Date                     Value                 08/08/2017               0.82             ----------  Body mass index is 24.48 kg/(m^2).     Needed:  No  Language:  English    Patient will be contacted to schedule procedure.     Please be aware that coverage of these services is subject to the terms and limitations of your health insurance plan.  Call member services at your health plan with any benefit or coverage questions.  Any procedures must be performed at a [place] facility OR coordinated by your clinic's referral office.    Please bring the following with you to your appointment:    (1) Any X-Rays, CTs or MRIs which have been performed.  Contact the facility where they were done to arrange for  prior to your scheduled appointment.    (2) List of current medications   (3) This referral request   (4) Any documents/labs given to you for this referral                  Follow-up notes from your care team     Return in about 1 year (around 7/26/2019).      Your next 10 appointments already scheduled     Aug 28, 2018  9:15 AM CDT   LAB with RU LAB   [facility] [place] ([facility])    [address]  [facility] [id number] [phone]           Please do not eat 10-12 hours before your appointment if you are coming in fasting for labs on lipids, cholesterol, or glucose (sugar). This does not apply to pregnant women. Water, hot tea and black coffee (with nothing added) are okay. Do not drink other fluids, diet soda or chew gum.            Aug 28, 2018  9:45 AM CDT   Ech Complete with [id number] ([facility] [place])    [address]  [facility] [place] [facility] [id number] [phone]           1.  Please bring or wear a comfortable two-piece outfit. 2.  You may eat, drink and take your normal medicines. 3.  For any questions that cannot be answered, please contact the ordering physician 4.  Please do not wear perfumes or scented lotions on the day of your exam. ***Please check-in at the [facility] located in [address] in the [facility] building. When you are finished registering, please go to [address] and have a seat. The technician will call your name for the test.            Aug 28, 2018  1:10 PM CDT   Return Visit with Delvin Dougherty PA-C   [facility] ([facility])    [address]  [place] [id number] [phone]              Who to contact     If you have questions or need follow up information about today's clinic visit or your schedule please contact [facility] directly at [phone].  Normal or non-critical lab and imaging results will be communicated to you by MyChart, letter or phone within 4 business days after the clinic has received the results. If you do not hear from us within 7 days, please contact the clinic through MyChart or phone. If you have a critical or abnormal lab result, we will notify you by phone as soon as possible.  Submit refill requests through Sincerely or call your pharmacy and they will  "forward the refill request to us. Please allow 3 business days for your refill to be completed.          Additional Information About Your Visit        Care EveryWhere ID     This is your Care EveryWhere ID. This could be used by other organizations to access your [place] medical records  [id number]        Your Vitals Were     Pulse Temperature Respirations Height BMI (Body Mass Index)       48 98.2  F (36.8  C) (Oral) 20 1.753 m (5' 9\") 24.48 kg/m2        Blood Pressure from Last 3 Encounters:   07/26/18 110/62   08/08/17 110/68   06/21/17 100/62    Weight from Last 3 Encounters:   07/26/18 75.2 kg (165 lb 12.8 oz)   08/08/17 75.1 kg (165 lb 8 oz)   06/21/17 72.7 kg (160 lb 3.2 oz)              We Performed the Following     ADMIN: Vaccine, Initial (00564)     COMPREHENSIVE METABOLIC PANEL ([facility]) MP     GASTROENTEROLOGY ADULT REF PROCEDURE ONLY Other     HCL PSA, SCREENING ([facility])     Hemoglobin A1c ([facility])     Hepatits C antibody ([facility])     Pneumococcal vaccine 13 valent PCV13 IM (Prevnar) [80334]     VENOUS COLLECTION          Where to get your medicines      These medications were sent to [id number] [address] [place] [id number]     Phone:  [phone]     atorvastatin 10 MG tablet    lisinopril 5 MG tablet          Primary Care Provider Office Phone # Fax #    Eriberto Shetty [phone] [address] [address]        Equal Access to Services     [facility] : Hadii aldo ku hadasho Soomaali, waaxda luqadaha, qaybta kaalmada abhay beckman idiin hayaan adeeg kharash la'aan . So [facility] [phone].    ATENCIÓN: Si habla español, tiene a maldonado disposición servicios gratuitos de asistencia lingüística. Serinaame al [phone].    We comply with applicable federal civil rights laws and [place] laws. We do not discriminate on the basis of race, color, national origin, age, disability, " sex, sexual orientation, or gender identity.            Thank you!     Thank you for choosing [facility]  for your care. Our goal is always to provide you with excellent care. Hearing back from our patients is one way we can continue to improve our services. Please take a few minutes to complete the written survey that you may receive in the mail after your visit with us. Thank you!             Your Updated Medication List - Protect others around you: Learn how to safely use, store and throw away your medicines at www.disposemymeds.org.          This list is accurate as of 7/26/18  2:28 PM.  Always use your most recent med list.                   Brand Name Dispense Instructions for use Diagnosis    aspirin 81 MG tablet      1 TABLET DAILY    Fever, unspecified       atorvastatin 10 MG tablet    LIPITOR    90 tablet    Take 1 tablet (10 mg) by mouth daily    Coronary artery disease involving native coronary artery of native heart without angina pectoris       hydrocortisone 2.5 % cream    ANUSOL-HC    30 g    Apply to anus BID prn    Anal pain       latanoprost 0.005 % ophthalmic solution    XALATAN     Place 1 drop into the right eye At Bedtime        lisinopril 5 MG tablet    PRINIVIL/ZESTRIL    90 tablet    Take 1 tablet (5 mg) by mouth daily    Coronary artery disease involving native coronary artery of native heart without angina pectoris       METAMUCIL FIBER PO

## 2018-07-27 LAB
ABBOTT PSA - QUEST: 0.6 NG/ML
ALBUMIN SERPL-MCNC: 4.1 G/DL (ref 3.6–5.1)
ALBUMIN/GLOB SERPL: 1.9 (CALC) (ref 1–2.5)
ALP SERPL-CCNC: 42 U/L (ref 40–115)
ALT SERPL-CCNC: 13 U/L (ref 9–46)
AST SERPL-CCNC: 20 U/L (ref 10–35)
BILIRUB SERPL-MCNC: 0.4 MG/DL (ref 0.2–1.2)
BUN SERPL-MCNC: 19 MG/DL (ref 7–25)
BUN/CREATININE RATIO: ABNORMAL (CALC) (ref 6–22)
CALCIUM SERPL-MCNC: 9 MG/DL (ref 8.6–10.3)
CHLORIDE SERPLBLD-SCNC: 105 MMOL/L (ref 98–110)
CO2 SERPL-SCNC: 26 MMOL/L (ref 20–31)
CREAT SERPL-MCNC: 0.81 MG/DL (ref 0.7–1.25)
EGFR AFRICAN AMERICAN - QUEST: 108 ML/MIN/1.73M2
GFR SERPL CREATININE-BSD FRML MDRD: 93 ML/MIN/1.73M2
GLOBULIN, CALCULATED - QUEST: 2.2 G/DL (CALC) (ref 1.9–3.7)
GLUCOSE - QUEST: 102 MG/DL (ref 65–99)
HCV AB - QUEST: NORMAL
POTASSIUM SERPL-SCNC: 4.4 MMOL/L (ref 3.5–5.3)
PROT SERPL-MCNC: 6.3 G/DL (ref 6.1–8.1)
SIGNAL TO CUT OFF - QUEST: 0.06
SODIUM SERPL-SCNC: 139 MMOL/L (ref 135–146)

## 2018-08-28 ENCOUNTER — DOCUMENTATION ONLY (OUTPATIENT)
Dept: CARDIOLOGY | Facility: CLINIC | Age: 68
End: 2018-08-28

## 2018-08-28 ENCOUNTER — HOSPITAL ENCOUNTER (OUTPATIENT)
Dept: CARDIOLOGY | Facility: CLINIC | Age: 68
Discharge: HOME | End: 2018-08-28
Attending: INTERNAL MEDICINE | Admitting: INTERNAL MEDICINE
Payer: COMMERCIAL

## 2018-08-28 ENCOUNTER — OFFICE VISIT (OUTPATIENT)
Dept: CARDIOLOGY | Facility: CLINIC | Age: 68
End: 2018-08-28
Attending: INTERNAL MEDICINE
Payer: COMMERCIAL

## 2018-08-28 VITALS
OXYGEN SATURATION: 96 % | HEART RATE: 60 BPM | WEIGHT: 169 LBS | HEIGHT: 69 IN | BODY MASS INDEX: 25.03 KG/M2 | SYSTOLIC BLOOD PRESSURE: 92 MMHG | DIASTOLIC BLOOD PRESSURE: 60 MMHG

## 2018-08-28 DIAGNOSIS — I42.9 CARDIOMYOPATHY, UNSPECIFIED TYPE (H): Primary | ICD-10-CM

## 2018-08-28 DIAGNOSIS — I25.10 CORONARY ARTERY DISEASE INVOLVING NATIVE CORONARY ARTERY OF NATIVE HEART WITHOUT ANGINA PECTORIS: ICD-10-CM

## 2018-08-28 DIAGNOSIS — I25.5 CARDIOMYOPATHY, ISCHEMIC: ICD-10-CM

## 2018-08-28 LAB
ANION GAP SERPL CALCULATED.3IONS-SCNC: 5 MMOL/L (ref 3–14)
BUN SERPL-MCNC: 18 MG/DL (ref 7–30)
CALCIUM SERPL-MCNC: 8.2 MG/DL (ref 8.5–10.1)
CHLORIDE SERPL-SCNC: 106 MMOL/L (ref 94–109)
CHOLEST SERPL-MCNC: 132 MG/DL
CO2 SERPL-SCNC: 29 MMOL/L (ref 20–32)
CREAT SERPL-MCNC: 0.79 MG/DL (ref 0.66–1.25)
GFR SERPL CREATININE-BSD FRML MDRD: >90 ML/MIN/1.7M2
GLUCOSE SERPL-MCNC: 87 MG/DL (ref 70–99)
HDLC SERPL-MCNC: 65 MG/DL
LDLC SERPL CALC-MCNC: 56 MG/DL
NONHDLC SERPL-MCNC: 67 MG/DL
POTASSIUM SERPL-SCNC: 3.9 MMOL/L (ref 3.4–5.3)
SODIUM SERPL-SCNC: 140 MMOL/L (ref 133–144)
TRIGL SERPL-MCNC: 54 MG/DL

## 2018-08-28 PROCEDURE — 80061 LIPID PANEL: CPT | Performed by: INTERNAL MEDICINE

## 2018-08-28 PROCEDURE — 80048 BASIC METABOLIC PNL TOTAL CA: CPT | Performed by: INTERNAL MEDICINE

## 2018-08-28 PROCEDURE — 93306 TTE W/DOPPLER COMPLETE: CPT

## 2018-08-28 PROCEDURE — 93306 TTE W/DOPPLER COMPLETE: CPT | Mod: 26 | Performed by: INTERNAL MEDICINE

## 2018-08-28 PROCEDURE — 99214 OFFICE O/P EST MOD 30 MIN: CPT | Mod: 25 | Performed by: PHYSICIAN ASSISTANT

## 2018-08-28 PROCEDURE — 36415 COLL VENOUS BLD VENIPUNCTURE: CPT | Performed by: INTERNAL MEDICINE

## 2018-08-28 NOTE — PROGRESS NOTES
Echo 8/28/18 noted, patient to see [facility] same day to review results.  Echo: Sinus Valsalva 39mm, ascending aorta normal size. 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 The left atrium is mildly dilated. The left ventricle is borderline dilated. By direct comparison the EF is similar to echo 8-15-16. NOTE- on 2016 echo on GE machine there was borderline appearance of non-compaction. On current echo on Farheen machine this is less appreciated. Consider MRI if non compaction in the differential diagnosis  Mildly decreased right ventricular systolic function  Will message Dr. Mensah to review    8/28/18 per Dr. Mensah's review - MRI not needed. Forwarded message to [facility] for OV today.

## 2018-08-28 NOTE — PATIENT INSTRUCTIONS
Blood pressure is lower today than normal.   If you notice any increase in the lightheadedness, you can decrease the lisinopril to 2.5 mg daily.  Then let us know.     Now that you are retired, work on increased exercise. Also continue to work on a heart healthy diet - lots of veggies and fruits, lean meats.  You can google Mediterranean style diet.      Any symptoms of chest pain, shortness of breath, please call us.     See Dr. Mensah next year with an echo and labs.

## 2018-08-28 NOTE — LETTER
2018    Eriberto Shetty MD  [address] [facility] [address]  [facility] [address]    RE: Sly Perrinsj       Dear Colleague,    I had the pleasure of seeing Sly Morton in the [facility].    CARDIOLOGY CLINIC PROGRESS NOTE    DOS: 2018      Sly Morton  : 1950, 67 year old  MRN: [id number]      History:   I had the pleasure of following up with Sly Morton today in the Cardiology Clinic.  Sly is a very pleasant, 67-year-old gentleman who is a patient of Dr. Mensah.  He  just retired from being an  in the [employer].       Sly has a history of coronary artery disease dating back to  when he presented with an anterior wall myocardial infarction.  He underwent stenting of his mid LAD.  In a staged fashion, he underwent stenting of the OM.  Ejection fraction was 35-40% with 2-3+ mitral regurgitation.  His last evaluation of his coronary anatomy was with a stress nuclear scan in 2012.  This demonstrated a small fixed anteroapical defect consistent with a nontransmural infarct, and there was inferior diaphragmatic attenuation.  Ejection fraction was estimated to be 47%.   Most recent echocardiogram today 18: LVEF 35-40% with trace to mild mitral regurgitation, unchanged from 2016.       Sly was last seen by Dr. Mensah on 2016.  At that time, he was doing very well.  It was noted his LDL was trending upward at that time.     Interval History:   Sly presents today for his annual followup.  He tells me he continues to do very well from a cardiac standpoint without any chest pain, shortness of breath, edema, dizziness, syncope.      Some LH from laying to standing. If he goes from laying to sitting, then sitting to standing, he does ok.   No vertigo, which he has had before.   He uses the [employer] machine 1 hour daily.   He works hard on a healthy diet, though does allow some treats and snacks at some  times. His wife is a great cook, so he does need to back off on seconds. He has gained about 10 lbs in 2 years.   Sly was seen by his Primary Care provider this summer as well.       ROS:  Skin:  Negative for     Eyes:  Positive for glasses  ENT:  Negative for    Respiratory:  Negative for    Cardiovascular:    lightheadedness;Positive for  Gastroenterology: Negative for    Genitourinary:  not assessed    Musculoskeletal:  Negative for    Neurologic:  Positive for numbness or tingling of feet  Psychiatric:  Negative for    Heme/Lymph/Imm:  Negative    Endocrine:  not assessed      PAST MEDICAL HISTORY:  Past Medical History:   Diagnosis Date     Cardiomyopathy, ischemic      Coronary artery disease      Family history of diabetes mellitus     Father     Hyperlipidaemia      Mitral valve disorders(424.0)     mitral regurgitation     Old myocardial infarction 5/04    anterior       PAST SURGICAL HISTORY:  Past Surgical History:   Procedure Laterality Date     C EYE EXAM ESTABLISHED PT  2003     C REPAIR DETACH RETINA,SCLERAL BUCKLE  1967    Right eye     HC KNEE SCOPE, DIAGNOSTIC  8/1/2011    Right knee arthroscopy with partial medial meniscectomy     HEART CATH, ANGIOPLASTY  May 2004     intracoronary stent placement of mid LAD May 2004,     HEART CATH, ANGIOPLASTY  June 2004    elective staged stent of OM        SOCIAL HISTORY:  Social History     Social History     Marital status:      Spouse name: Yoni     Number of children: 2     Years of education: 18     Occupational History     [employer]     retired     Social History Main Topics     Smoking status: Never Smoker     Smokeless tobacco: Never Used     Alcohol use No     Drug use: No     Sexual activity: Yes     Partners: Female     Other Topics Concern     Caffeine Concern Yes     2-4 cups daily     Special Diet Yes     low fat, low cholesterol     Exercise Yes     90 mins daily     Seat Belt Yes     Social History Narrative  "      FAMILY HISTORY:  Family History   Problem Relation Age of Onset     Diabetes Father      DARON. Father      Cancer - colorectal No family hx of      Prostate Cancer No family hx of        MEDS:   Current Outpatient Prescriptions on File Prior to Visit:  ASPIRIN 81 MG OR TABS 1 TABLET DAILY   atorvastatin (LIPITOR) 10 MG tablet Take 1 tablet (10 mg) by mouth daily   latanoprost (XALATAN) 0.005 % ophthalmic solution Place 1 drop into the right eye At Bedtime    lisinopril (PRINIVIL/ZESTRIL) 5 MG tablet Take 1 tablet (5 mg) by mouth daily   Psyllium (METAMUCIL FIBER PO)    hydrocortisone (ANUSOL-HC) 2.5 % cream Apply to anus BID prn (Patient not taking: Reported on 8/28/2018)     No current facility-administered medications on file prior to visit.     ALLERGIES:   Allergies   Allergen Reactions     Cats      No Known Allergies        PHYSICAL EXAM:  Vitals: BP 92/60  Pulse 60  Ht 1.753 m (5' 9\")  Wt 76.7 kg (169 lb)  SpO2 96%  BMI 24.96 kg/m2  Constitutional:  cooperative, alert and oriented, well developed, well nourished, in no acute distress        Skin:  warm and dry to the touch, no apparent skin lesions or masses noted        Head:  normocephalic, no masses or lesions        Eyes:  pupils equal and round;conjunctivae and lids unremarkable;sclera white;no xanthalasma        ENT:  no pallor or cyanosis, dentition good        Neck:  JVP normal        Respiratory:  normal breath sounds, clear to auscultation, normal A-P diameter, normal symmetry, normal respiratory excursion, no use of accessory muscles        Cardiac: regular rhythm;no murmurs, gallops or rubs detected;normal S1 and S2                  GI:  abdomen soft;BS normoactive;non-tender        Vascular: pulses full and equal                                      Extremities and Musculoskeletal:  no edema;no spinal abnormalities noted;normal muscle strength and tone        Neurological:  no gross motor deficits;affect appropriate    "       LABS/DATA:  I reviewed the following:  Echo 8/28/18:  Interpretation Summary     Sinus Valsalva 39mm, ascending aorta normal size  The atrial septum is aneurysmal.  The left atrium is mildly dilated.  The left ventricle is borderline dilated.  By direct comparison the EF is similar to echo 8-15-16. NOTE- on 2016 echo on  GE machine there was borderline appearance of non -compaction. On current echo  on Farheen machine this is less appreciated. Consider MRI if non compaction in  the differential diagnosis  Mildly decreased right ventricular systolic function      ASSESSMENT/PLAN:  Sly is a very pleasant, 67-year-old gentleman with a history of coronary artery disease and cardiomyopathy.  He also has a history of mitral regurgitation, though this has improved, now trace to mild MR on echo 8/28/18.       Sly continues to do very well from a cardiac standpoint without any clinical evidence of ischemia, heart failure or significant arrhythmia.       His echo shows LVEF 35-40%.  This is stable since 2016.  His LVEF improved post MI, but had trended down again over the years.  Nuc in 2012 was negative for ischemia.  He currently has no chest pain, nor any CHF sxs.  Will repeat echo next year.  No stress test now given stable LVEF and no sxs.  Defer stress testing to Dr. Mensah.     I do note, the echo results said to consider CMRI if concern for LV non compaction.  Dr. Mensah did review, and CMRI is not necessary.     Repeat fasting lipid panel 8/28/18 shows , HDL 65, LDL 56, TG 54.  Despite these numbers, his weight is up about 5 more pounds (10 over the past 2 years).   He does exercise daily, and tries to eat healthy.  We discussed Mediterranean style diet.  He plans to work at this more.       He will continue his current medication regimen.  Should he develop an increase in LH, he will notify us, and we can trial decreasing lisinopril to 2.5 mg.       Follow up in 1 year with Dr. Mensah with  repeat fasting lipid panel, basic metabolic panel and echocardiogram.         He was seen in the Primary Care Clinic this summer and declines refills at this time.       Thank you very much for allowing me to participate in the care of Sly Morton.     Sincerely,     Delvin Dougherty PA-C     [facility]

## 2018-08-28 NOTE — PROGRESS NOTES
CARDIOLOGY CLINIC PROGRESS NOTE    DOS: 2018      Sly Morton  : 1950, 67 year old  MRN: [id number]      History:   I had the pleasure of following up with Sly Morton today in the Cardiology Clinic.  Sly is a very pleasant, 67-year-old gentleman who is a patient of Dr. Mensah.  He just retired from being an  in the [employer].       Sly has a history of coronary artery disease dating back to  when he presented with an anterior wall myocardial infarction.  He underwent stenting of his mid LAD.  In a staged fashion, he underwent stenting of the OM.  Ejection fraction was 35-40% with 2-3+ mitral regurgitation.  His last evaluation of his coronary anatomy was with a stress nuclear scan in 2012.  This demonstrated a small fixed anteroapical defect consistent with a nontransmural infarct, and there was inferior diaphragmatic attenuation.  Ejection fraction was estimated to be 47%.   Most recent echocardiogram today 18: LVEF 35-40% with trace to mild mitral regurgitation, unchanged from 2016.       Sly was last seen by Dr. Mensah on 2016.  At that time, he was doing very well.  It was noted his LDL was trending upward at that time.     Interval History:   Sly presents today for his annual followup.  He tells me he continues to do very well from a cardiac standpoint without any chest pain, shortness of breath, edema, dizziness, syncope.      Some LH from laying to standing. If he goes from laying to sitting, then sitting to standing, he does ok.   No vertigo, which he has had before.   He uses the Simmr machine 1 hour daily.   He works hard on a healthy diet, though does allow some treats and snacks at some times. His wife is a great cook, so he does need to back off on seconds. He has gained about 10 lbs in 2 years.   Sly was seen by his Primary Care provider this summer as well.       ROS:  Skin:  Negative for     Eyes:  Positive for  glasses  ENT:  Negative for    Respiratory:  Negative for    Cardiovascular:    lightheadedness;Positive for  Gastroenterology: Negative for    Genitourinary:  not assessed    Musculoskeletal:  Negative for    Neurologic:  Positive for numbness or tingling of feet  Psychiatric:  Negative for    Heme/Lymph/Imm:  Negative    Endocrine:  not assessed      PAST MEDICAL HISTORY:  Past Medical History:   Diagnosis Date     Cardiomyopathy, ischemic      Coronary artery disease      Family history of diabetes mellitus     Father     Hyperlipidaemia      Mitral valve disorders(424.0)     mitral regurgitation     Old myocardial infarction 5/04    anterior       PAST SURGICAL HISTORY:  Past Surgical History:   Procedure Laterality Date     C EYE EXAM ESTABLISHED PT  2003     C REPAIR DETACH RETINA,SCLERAL BUCKLE  1967    Right eye     HC KNEE SCOPE, DIAGNOSTIC  8/1/2011    Right knee arthroscopy with partial medial meniscectomy     HEART CATH, ANGIOPLASTY  May 2004     intracoronary stent placement of mid LAD May 2004,     HEART CATH, ANGIOPLASTY  June 2004    elective staged stent of OM        SOCIAL HISTORY:  Social History     Social History     Marital status:      Spouse name: Yoni     Number of children: 2     Years of education: 18     Occupational History     [employer]     retired     Social History Main Topics     Smoking status: Never Smoker     Smokeless tobacco: Never Used     Alcohol use No     Drug use: No     Sexual activity: Yes     Partners: Female     Other Topics Concern     Caffeine Concern Yes     2-4 cups daily     Special Diet Yes     low fat, low cholesterol     Exercise Yes     90 mins daily     Seat Belt Yes     Social History Narrative       FAMILY HISTORY:  Family History   Problem Relation Age of Onset     Diabetes Father      C.A.D. Father      Cancer - colorectal No family hx of      Prostate Cancer No family hx of        MEDS:   Current Outpatient Prescriptions  "on File Prior to Visit:  ASPIRIN 81 MG OR TABS 1 TABLET DAILY   atorvastatin (LIPITOR) 10 MG tablet Take 1 tablet (10 mg) by mouth daily   latanoprost (XALATAN) 0.005 % ophthalmic solution Place 1 drop into the right eye At Bedtime    lisinopril (PRINIVIL/ZESTRIL) 5 MG tablet Take 1 tablet (5 mg) by mouth daily   Psyllium (METAMUCIL FIBER PO)    hydrocortisone (ANUSOL-HC) 2.5 % cream Apply to anus BID prn (Patient not taking: Reported on 8/28/2018)     No current facility-administered medications on file prior to visit.     ALLERGIES:   Allergies   Allergen Reactions     Cats      No Known Allergies        PHYSICAL EXAM:  Vitals: BP 92/60  Pulse 60  Ht 1.753 m (5' 9\")  Wt 76.7 kg (169 lb)  SpO2 96%  BMI 24.96 kg/m2  Constitutional:  cooperative, alert and oriented, well developed, well nourished, in no acute distress        Skin:  warm and dry to the touch, no apparent skin lesions or masses noted        Head:  normocephalic, no masses or lesions        Eyes:  pupils equal and round;conjunctivae and lids unremarkable;sclera white;no xanthalasma        ENT:  no pallor or cyanosis, dentition good        Neck:  JVP normal        Respiratory:  normal breath sounds, clear to auscultation, normal A-P diameter, normal symmetry, normal respiratory excursion, no use of accessory muscles        Cardiac: regular rhythm;no murmurs, gallops or rubs detected;normal S1 and S2                  GI:  abdomen soft;BS normoactive;non-tender        Vascular: pulses full and equal                                      Extremities and Musculoskeletal:  no edema;no spinal abnormalities noted;normal muscle strength and tone        Neurological:  no gross motor deficits;affect appropriate          LABS/DATA:  I reviewed the following:  Echo 8/28/18:  Interpretation Summary     Sinus Valsalva 39mm, ascending aorta normal size  The atrial septum is aneurysmal.  The left atrium is mildly dilated.  The left ventricle is borderline " dilated.  By direct comparison the EF is similar to echo 8-15-16. NOTE- on 2016 echo on  GE machine there was borderline appearance of non -compaction. On current echo  on Farheen machine this is less appreciated. Consider MRI if non compaction in  the differential diagnosis  Mildly decreased right ventricular systolic function      ASSESSMENT/PLAN:  Sly is a very pleasant, 67-year-old gentleman with a history of coronary artery disease and cardiomyopathy.  He also has a history of mitral regurgitation, though this has improved, now trace to mild MR on echo 8/28/18.       Sly continues to do very well from a cardiac standpoint without any clinical evidence of ischemia, heart failure or significant arrhythmia.       His echo shows LVEF 35-40%.  This is stable since 2016.  His LVEF improved post MI, but had trended down again over the years.  Nuc in 2012 was negative for ischemia.  He currently has no chest pain, nor any CHF sxs.  Will repeat echo next year.  No stress test now given stable LVEF and no sxs.  Defer stress testing to Dr. Mensah.     I do note, the echo results said to consider CMRI if concern for LV non compaction.  Dr. Mensah did review, and CMRI is not necessary.     Repeat fasting lipid panel 8/28/18 shows , HDL 65, LDL 56, TG 54.  Despite these numbers, his weight is up about 5 more pounds (10 over the past 2 years).  He does exercise daily, and tries to eat healthy.  We discussed Mediterranean style diet.  He plans to work at this more.       He will continue his current medication regimen.  Should he develop an increase in LH, he will notify us, and we can trial decreasing lisinopril to 2.5 mg.       Follow up in 1 year with Dr. Mensah with repeat fasting lipid panel, basic metabolic panel and echocardiogram.         He was seen in the Primary Care Clinic this summer and declines refills at this time.       Thank you very much for allowing me to participate in the care of Sly  Praveen.     Delvin Dougherty PA-C

## 2018-08-28 NOTE — MR AVS SNAPSHOT
After Visit Summary   8/28/2018    Sly Morton    MRN: [id number]           Patient Information     Date Of Birth          1950        Visit Information        Provider Department      8/28/2018 1:10 PM Delvin Dougherty PA-C [facility]        Today's Diagnoses     Cardiomyopathy, unspecified type (H)    -  1    Coronary artery disease involving native coronary artery of native heart without angina pectoris          Care Instructions    Blood pressure is lower today than normal.   If you notice any increase in the lightheadedness, you can decrease the lisinopril to 2.5 mg daily.  Then let us know.     Now that you are retired, work on increased exercise. Also continue to work on a heart healthy diet - lots of veggies and fruits, lean meats.  You can google Mediterranean style diet.      Any symptoms of chest pain, shortness of breath, please call us.     See Dr. Mensah next year with an echo and labs.                Follow-ups after your visit        Additional Services     Follow-Up with Cardiologist                 Future tests that were ordered for you today     Open Future Orders        Priority Expected Expires Ordered    Basic metabolic panel Routine 8/28/2019 8/29/2019 8/28/2018    Lipid Profile Routine 8/28/2019 8/28/2019 8/28/2018    ALT Routine 8/28/2019 8/28/2019 8/28/2018    Echocardiogram Routine 8/28/2019 8/29/2019 8/28/2018    Follow-Up with Cardiologist Routine 8/28/2019 8/29/2019 8/28/2018            Who to contact     If you have questions or need follow up information about today's clinic visit or your schedule please contact [facility] directly at [phone].  Normal or non-critical lab and imaging results will be communicated to you by MyChart, letter or phone within 4 business days after the clinic has received the results. If you do not hear from us within 7 days, please contact the  "clinic through Trunityhart or phone. If you have a critical or abnormal lab result, we will notify you by phone as soon as possible.  Submit refill requests through Trunityhart or call your pharmacy and they will forward the refill request to us. Please allow 3 business days for your refill to be completed.          Additional Information About Your Visit        Care EveryWhere ID     This is your Care EveryWhere ID. This could be used by other organizations to access your [place] medical records  [id number]        Your Vitals Were     Pulse Height Pulse Oximetry BMI (Body Mass Index)          60 1.753 m (5' 9\") 96% 24.96 kg/m2         Blood Pressure from Last 3 Encounters:   08/28/18 92/60   07/26/18 110/62   08/08/17 110/68    Weight from Last 3 Encounters:   08/28/18 76.7 kg (169 lb)   07/26/18 75.2 kg (165 lb 12.8 oz)   08/08/17 75.1 kg (165 lb 8 oz)               Primary Care Provider Office Phone # Fax #    Eriberto Shetty [phone]       [address] [facility] [address]  [facility] [address]        Equal Access to Services     SOLANGE JIMENEZ AH: Hadii aad ku hadasho Soomaali, waaxda luqadaha, qaybta kaalmada adeegyada, waxay idiin hayaan adebhavesh muñoz ah. So [facility] [phone].    ATENCIÓN: Si habla español, tiene a maldonado disposición servicios gratuitos de asistencia lingüística. Clarita al [phone].    We comply with applicable federal civil rights laws and [place] laws. We do not discriminate on the basis of race, color, national origin, age, disability, sex, sexual orientation, or gender identity.            Thank you!     Thank you for choosing [facility]  for your care. Our goal is always to provide you with excellent care. Hearing back from our patients is one way we can continue to improve our services. Please take a few minutes to complete the written survey that you may receive in the mail after your visit with us. Thank you!           "   Your Updated Medication List - Protect others around you: Learn how to safely use, store and throw away your medicines at www.disposemymeds.org.          This list is accurate as of 8/28/18  1:47 PM.  Always use your most recent med list.                   Brand Name Dispense Instructions for use Diagnosis    aspirin 81 MG tablet      1 TABLET DAILY    Fever, unspecified       atorvastatin 10 MG tablet    LIPITOR    90 tablet    Take 1 tablet (10 mg) by mouth daily    Coronary artery disease involving native coronary artery of native heart without angina pectoris       hydrocortisone 2.5 % cream    ANUSOL-HC    30 g    Apply to anus BID prn    Anal pain       latanoprost 0.005 % ophthalmic solution    XALATAN     Place 1 drop into the right eye At Bedtime        lisinopril 5 MG tablet    PRINIVIL/ZESTRIL    90 tablet    Take 1 tablet (5 mg) by mouth daily    Coronary artery disease involving native coronary artery of native heart without angina pectoris       METAMUCIL FIBER PO

## 2018-08-28 NOTE — LETTER
2018    Eriberto Shetty MD  [address] [facility] [address]  [facility] [address]    RE: Sly Perrinsj       Dear Colleague,    I had the pleasure of seeing Sly Morton in the [facility].    CARDIOLOGY CLINIC PROGRESS NOTE    DOS: 2018      Sly Morton  : 1950, 67 year old  MRN: [id number]      History:   I had the pleasure of following up with Sly Morton today in the Cardiology Clinic.  Sly is a very pleasant, 67-year-old gentleman who is a patient of Dr. Mensah.  He  just retired from being an  in the [employer].       Sly has a history of coronary artery disease dating back to  when he presented with an anterior wall myocardial infarction.  He underwent stenting of his mid LAD.  In a staged fashion, he underwent stenting of the OM.  Ejection fraction was 35-40% with 2-3+ mitral regurgitation.  His last evaluation of his coronary anatomy was with a stress nuclear scan in 2012.  This demonstrated a small fixed anteroapical defect consistent with a nontransmural infarct, and there was inferior diaphragmatic attenuation.  Ejection fraction was estimated to be 47%.   Most recent echocardiogram today 18: LVEF 35-40% with trace to mild mitral regurgitation, unchanged from 2016.       Sly was last seen by Dr. Mensah on 2016.  At that time, he was doing very well.  It was noted his LDL was trending upward at that time.     Interval History:   Sly presents today for his annual followup.  He tells me he continues to do very well from a cardiac standpoint without any chest pain, shortness of breath, edema, dizziness, syncope.      Some LH from laying to standing. If he goes from laying to sitting, then sitting to standing, he does ok.   No vertigo, which he has had before.   He uses the IZI-collecte machine 1 hour daily.   He works hard on a healthy diet, though does allow some treats and snacks at some  times. His wife is a great cook, so he does need to back off on seconds. He has gained about 10 lbs in 2 years.   Sly was seen by his Primary Care provider this summer as well.       ROS:  Skin:  Negative for     Eyes:  Positive for glasses  ENT:  Negative for    Respiratory:  Negative for    Cardiovascular:    lightheadedness;Positive for  Gastroenterology: Negative for    Genitourinary:  not assessed    Musculoskeletal:  Negative for    Neurologic:  Positive for numbness or tingling of feet  Psychiatric:  Negative for    Heme/Lymph/Imm:  Negative    Endocrine:  not assessed      PAST MEDICAL HISTORY:  Past Medical History:   Diagnosis Date     Cardiomyopathy, ischemic      Coronary artery disease      Family history of diabetes mellitus     Father     Hyperlipidaemia      Mitral valve disorders(424.0)     mitral regurgitation     Old myocardial infarction 5/04    anterior       PAST SURGICAL HISTORY:  Past Surgical History:   Procedure Laterality Date     C EYE EXAM ESTABLISHED PT  2003     C REPAIR DETACH RETINA,SCLERAL BUCKLE  1967    Right eye     HC KNEE SCOPE, DIAGNOSTIC  8/1/2011    Right knee arthroscopy with partial medial meniscectomy     HEART CATH, ANGIOPLASTY  May 2004     intracoronary stent placement of mid LAD May 2004,     HEART CATH, ANGIOPLASTY  June 2004    elective staged stent of OM        SOCIAL HISTORY:  Social History     Social History     Marital status:      Spouse name: Yoni     Number of children: 2     Years of education: 18     Occupational History     [employer]     retired     Social History Main Topics     Smoking status: Never Smoker     Smokeless tobacco: Never Used     Alcohol use No     Drug use: No     Sexual activity: Yes     Partners: Female     Other Topics Concern     Caffeine Concern Yes     2-4 cups daily     Special Diet Yes     low fat, low cholesterol     Exercise Yes     90 mins daily     Seat Belt Yes     Social History Narrative  "      FAMILY HISTORY:  Family History   Problem Relation Age of Onset     Diabetes Father      DARON. Father      Cancer - colorectal No family hx of      Prostate Cancer No family hx of        MEDS:   Current Outpatient Prescriptions on File Prior to Visit:  ASPIRIN 81 MG OR TABS 1 TABLET DAILY   atorvastatin (LIPITOR) 10 MG tablet Take 1 tablet (10 mg) by mouth daily   latanoprost (XALATAN) 0.005 % ophthalmic solution Place 1 drop into the right eye At Bedtime    lisinopril (PRINIVIL/ZESTRIL) 5 MG tablet Take 1 tablet (5 mg) by mouth daily   Psyllium (METAMUCIL FIBER PO)    hydrocortisone (ANUSOL-HC) 2.5 % cream Apply to anus BID prn (Patient not taking: Reported on 8/28/2018)     No current facility-administered medications on file prior to visit.     ALLERGIES:   Allergies   Allergen Reactions     Cats      No Known Allergies        PHYSICAL EXAM:  Vitals: BP 92/60  Pulse 60  Ht 1.753 m (5' 9\")  Wt 76.7 kg (169 lb)  SpO2 96%  BMI 24.96 kg/m2  Constitutional:  cooperative, alert and oriented, well developed, well nourished, in no acute distress        Skin:  warm and dry to the touch, no apparent skin lesions or masses noted        Head:  normocephalic, no masses or lesions        Eyes:  pupils equal and round;conjunctivae and lids unremarkable;sclera white;no xanthalasma        ENT:  no pallor or cyanosis, dentition good        Neck:  JVP normal        Respiratory:  normal breath sounds, clear to auscultation, normal A-P diameter, normal symmetry, normal respiratory excursion, no use of accessory muscles        Cardiac: regular rhythm;no murmurs, gallops or rubs detected;normal S1 and S2                  GI:  abdomen soft;BS normoactive;non-tender        Vascular: pulses full and equal                                      Extremities and Musculoskeletal:  no edema;no spinal abnormalities noted;normal muscle strength and tone        Neurological:  no gross motor deficits;affect appropriate    "       LABS/DATA:  I reviewed the following:  Echo 8/28/18:  Interpretation Summary     Sinus Valsalva 39mm, ascending aorta normal size  The atrial septum is aneurysmal.  The left atrium is mildly dilated.  The left ventricle is borderline dilated.  By direct comparison the EF is similar to echo 8-15-16. NOTE- on 2016 echo on  GE machine there was borderline appearance of non -compaction. On current echo  on Farheen machine this is less appreciated. Consider MRI if non compaction in  the differential diagnosis  Mildly decreased right ventricular systolic function      ASSESSMENT/PLAN:  Sly is a very pleasant, 67-year-old gentleman with a history of coronary artery disease and cardiomyopathy.  He also has a history of mitral regurgitation, though this has improved, now trace to mild MR on echo 8/28/18.       Sly continues to do very well from a cardiac standpoint without any clinical evidence of ischemia, heart failure or significant arrhythmia.       His echo shows LVEF 35-40%.  This is stable since 2016.  His LVEF improved post MI, but had trended down again over the years.  Nuc in 2012 was negative for ischemia.  He currently has no chest pain, nor any CHF sxs.  Will repeat echo next year.  No stress test now given stable LVEF and no sxs.  Defer stress testing to Dr. Mensah.     I do note, the echo results said to consider CMRI if concern for LV non compaction.  Dr. Mensah did review, and CMRI is not necessary.     Repeat fasting lipid panel 8/28/18 shows , HDL 65, LDL 56, TG 54.  Despite these numbers, his weight is up about 5 more pounds (10 over the past 2 years).   He does exercise daily, and tries to eat healthy.  We discussed Mediterranean style diet.  He plans to work at this more.       He will continue his current medication regimen.  Should he develop an increase in LH, he will notify us, and we can trial decreasing lisinopril to 2.5 mg.       Follow up in 1 year with Dr. Mensah with  repeat fasting lipid panel, basic metabolic panel and echocardiogram.         He was seen in the Primary Care Clinic this summer and declines refills at this time.       Thank you very much for allowing me to participate in the care of Sly Morton.     Delvin Dougherty PA-C    Thank you for allowing me to participate in the care of your patient.      Sincerely,     Delvin Dougherty PA-C     [facility]    cc:   Eriberto Shetty MD  [address] [address]

## 2018-12-07 ENCOUNTER — TELEPHONE (OUTPATIENT)
Dept: CARDIOLOGY | Facility: CLINIC | Age: 68
End: 2018-12-07

## 2018-12-07 NOTE — TELEPHONE ENCOUNTER
Pt called, asking specifically what type of exercises to do to improve his ejection fraction, requesting a specific workout plan, what types of food he should eat, what he can do to improve his cholesterol levels. Reviewed and answered his questions in depth - spent approximately 35 minutes explaining this. Offered OV but pt declined.   Pt wants to know specifically what can cause his EF to decrease and why it happened from 2016 to 2018. I advised by looking at his chart and last OV pt has hx CAD w/ stenting in 2004, so likely from his CAD. However, again an appropriate question in detail for an OV. Reviewed cholesterol labs for the last several years. Advised he is doing great and to continue to eat healthy, exercise and take his meds consistently. Pt understood.   Beena DIAS

## 2019-07-30 DIAGNOSIS — K62.89 ANAL PAIN: ICD-10-CM

## 2019-07-30 NOTE — TELEPHONE ENCOUNTER
Pending Prescriptions:                       Disp   Refills    PROCTO-MED HC 2.5 % cream [Pharmacy Med N*30 g                Sig: apply to the anus twice daily as needed    This refill is from 6-    Last ov was px on 7-  Fax or deny SEND to FD for a FASTING yearly px  Zainab  [phone]

## 2019-08-08 DIAGNOSIS — K62.89 ANAL PAIN: ICD-10-CM

## 2019-08-08 NOTE — TELEPHONE ENCOUNTER
Refused Prescriptions:                       Disp   Refills    hydrocortisone (ANUSOL-HC) 2.5 % cream [Ph*30 g   0        Sig: apply to the anus twice daily as needed  Refused By: ZAINAB HERNANDEZ  Reason for Refusal: Request already responded to by other means (phone, fax, etc.)  Reason for Refusal Comment: refilled 7- DUE FOR OV    DUE FOR YEARLY FASTING OV  Zainab  [phone] (home) [phone] (work)

## 2019-08-14 ENCOUNTER — OFFICE VISIT (OUTPATIENT)
Dept: FAMILY MEDICINE | Facility: CLINIC | Age: 69
End: 2019-08-14

## 2019-08-14 VITALS
BODY MASS INDEX: 21.74 KG/M2 | TEMPERATURE: 97.9 F | OXYGEN SATURATION: 98 % | HEART RATE: 51 BPM | SYSTOLIC BLOOD PRESSURE: 110 MMHG | WEIGHT: 147.2 LBS | DIASTOLIC BLOOD PRESSURE: 70 MMHG

## 2019-08-14 DIAGNOSIS — E78.00 PURE HYPERCHOLESTEROLEMIA: Primary | ICD-10-CM

## 2019-08-14 DIAGNOSIS — K62.89 ANAL PAIN: ICD-10-CM

## 2019-08-14 DIAGNOSIS — I25.10 CORONARY ARTERY DISEASE INVOLVING NATIVE CORONARY ARTERY OF NATIVE HEART WITHOUT ANGINA PECTORIS: ICD-10-CM

## 2019-08-14 DIAGNOSIS — F95.9 FACIAL TIC: ICD-10-CM

## 2019-08-14 PROCEDURE — 99214 OFFICE O/P EST MOD 30 MIN: CPT | Performed by: FAMILY MEDICINE

## 2019-08-14 RX ORDER — ZOSTER VACCINE RECOMBINANT, ADJUVANTED 50 MCG/0.5
KIT INTRAMUSCULAR
Refills: 1 | COMMUNITY
Start: 2018-11-26 | End: 2019-08-14

## 2019-08-14 NOTE — NURSING NOTE
Sly is here for medication for his fissure    Pre-visit Screening:  Immunizations:  up to date  Colonoscopy:  is up to date  Mammogram: NA  Asthma Action Test/Plan:  NA  PHQ9:  None  GAD7:  None  Questioned patient about current smoking habits Pt. has never smoked.  Ok to leave detailed message on voice mail for today's visit only Yes, phone # [phone]

## 2019-08-14 NOTE — PROGRESS NOTES
Subjective     Sly Morton is a 68 year old male who presents to clinic today for the following health issues:    HPI   Hyperlipidemia Follow-Up      Are you having any of the following symptoms? (Select all that apply)  No complaints of shortness of breath, chest pain or pressure.  No increased sweating or nausea with activity.  No left-sided neck or arm pain.  No complaints of pain in calves when walking 1-2 blocks.    Are you regularly taking any medication or supplement to lower your cholesterol?   Yes- atorvastatin    Are you having muscle aches or other side effects that you think could be caused by your cholesterol lowering medication?  No      Vascular Disease Follow-up      Are you having any of the following symptoms? (Select all that apply) No complaints of shortness of breath, chest pain or pressure.  No increased sweating or nausea with activity.  No left-sided neck or arm pain.  No complaints of pain in calves when walking 1-2 blocks.    How often do you take nitroglycerin? Never    Do you take an aspirin every day? Yes        How many servings of fruits and vegetables do you eat daily?  2-3    On average, how many sweetened beverages do you drink each day (soda, juice, sweet tea, etc)?   1    How many days per week do you miss taking your medication? 0        Anal fissure flaring- requests refill cream    Pt has left facial tic-muscles twitching -uncontrollable, no pain -not present 2 days    Patient Active Problem List   Diagnosis     Sciatica     Family history of diabetes mellitus     CATARACT NEC Right      Health Care Home     ACP (advance care planning)     Coronary artery disease involving native coronary artery of native heart without angina pectoris     Cardiomyopathy, ischemic     Hyperlipidemia     Mitral valve disorder     ST elevation myocardial infarction involving left anterior descending (LAD) coronary artery (H)     Past Surgical History:   Procedure Laterality Date     C EYE EXAM  ESTABLISHED PT  2003     C REPAIR DETACH RETINA,SCLERAL BUCKLE  1967    Right eye     HC KNEE SCOPE, DIAGNOSTIC  8/1/2011    Right knee arthroscopy with partial medial meniscectomy     HEART CATH, ANGIOPLASTY  May 2004     intracoronary stent placement of mid LAD May 2004,     HEART CATH, ANGIOPLASTY  June 2004    elective staged stent of OM        Social History     Tobacco Use     Smoking status: Never Smoker     Smokeless tobacco: Never Used   Substance Use Topics     Alcohol use: No     Alcohol/week: 0.0 oz     Family History   Problem Relation Age of Onset     Diabetes Father      C.A.D. Father      Cancer - colorectal No family hx of      Prostate Cancer No family hx of          Current Outpatient Medications   Medication Sig Dispense Refill     ASPIRIN 81 MG OR TABS 1 TABLET DAILY       atorvastatin (LIPITOR) 10 MG tablet Take 1 tablet (10 mg) by mouth daily 90 tablet 3     lisinopril (PRINIVIL/ZESTRIL) 5 MG tablet Take 1 tablet (5 mg) by mouth daily 90 tablet 3     PROCTO-MED HC 2.5 % cream apply to the anus twice daily as needed 30 g 0     Psyllium (METAMUCIL FIBER PO)        latanoprost (XALATAN) 0.005 % ophthalmic solution Place 1 drop into the right eye At Bedtime   5     Allergies   Allergen Reactions     Cats      No Known Allergies          Reviewed and updated as needed this visit by Provider         Review of Systems   ROS COMP: Constitutional, HEENT, cardiovascular, pulmonary, gi and gu systems are negative, except as otherwise noted.      Objective    /70 (BP Location: Right arm, Patient Position: Sitting, Cuff Size: Adult Regular)   Pulse 51   Temp 97.9  F (36.6  C) (Oral)   Wt 66.8 kg (147 lb 3.2 oz)   SpO2 98%   BMI 21.74 kg/m    Body mass index is 21.74 kg/m .  Physical Exam   GENERAL: healthy, alert and no distress  EYES: Eyes grossly normal to inspection, PERRL and conjunctivae and sclerae normal  HENT: ear canals and TM's normal, nose and mouth without ulcers or lesions  NECK: no  adenopathy, no asymmetry, masses, or scars and thyroid normal to palpation  RESP: lungs clear to auscultation - no rales, rhonchi or wheezes  CV: regular rate and rhythm, normal S1 S2, no S3 or S4, no murmur, click or rub, no peripheral edema and peripheral pulses strong  ABDOMEN: soft, nontender, no hepatosplenomegaly, no masses and bowel sounds normal  MS: no gross musculoskeletal defects noted, no edema  SKIN: no suspicious lesions or rashes  NEURO: Normal strength and tone, mentation intact and speech normal  PSYCH: mentation appears normal, affect normal/bright    Diagnostic Test Results:  Labs reviewed in Epic        Assessment & Plan   Assessment      Plan  (E78.00) Pure hypercholesterolemia  (primary encounter diagnosis)  Comment: stable-will see cardiology next months with fasting labs  Plan: continue current medications at current doses     (I25.10) Coronary artery disease involving native coronary artery of native heart without angina pectoris  Comment: stable symptomatically   Plan: continue current medications at current doses     (K62.89) Anal pain  Comment: recurrence of fissure pain  Plan: procto cream, f/u if not improving or worsening     (F95.9) Facial tic  Comment: likely mild nerve irritation-resolved today  Plan: f/u if not improving or worsening       FUTURE APPOINTMENTS:       - Follow-up visit in 1 yr  Work on weight loss  Regular exercise    No follow-ups on file.    Eriberto Shetty MD  [facility]

## 2019-08-15 DIAGNOSIS — K62.89 ANAL PAIN: ICD-10-CM

## 2019-08-15 NOTE — TELEPHONE ENCOUNTER
Refused Prescriptions:                       Disp   Refills    hydrocortisone (ANUSOL-HC) 2.5 % cream [Ph*30 g   0        Sig: apply to the anus twice daily as needed  Refused By: ZAINAB HERNANDEZ  Reason for Refusal: Request already responded to by other means (phone, fax, etc.)  Reason for Refusal Comment: refilled 8-    Zainab  There are no phone numbers on file.

## 2019-08-23 DIAGNOSIS — K62.89 ANAL PAIN: ICD-10-CM

## 2019-08-23 NOTE — TELEPHONE ENCOUNTER
Refused Prescriptions:                       Disp   Refills    hydrocortisone (ANUSOL-HC) 2.5 % cream [Ph*30 g   0        Sig: apply to the anus twice daily as needed  Refused By: ZAINAB HERNANDEZ  Reason for Refusal: Request already responded to by other means (phone, fax, etc.)  Reason for Refusal Comment: refilled 8-           Zainab

## 2019-08-24 DIAGNOSIS — K62.89 ANAL PAIN: ICD-10-CM

## 2019-08-26 ENCOUNTER — OFFICE VISIT (OUTPATIENT)
Dept: FAMILY MEDICINE | Facility: CLINIC | Age: 69
End: 2019-08-26

## 2019-08-26 VITALS
WEIGHT: 146.4 LBS | DIASTOLIC BLOOD PRESSURE: 70 MMHG | HEART RATE: 54 BPM | SYSTOLIC BLOOD PRESSURE: 124 MMHG | BODY MASS INDEX: 21.62 KG/M2 | OXYGEN SATURATION: 97 % | TEMPERATURE: 98.4 F

## 2019-08-26 DIAGNOSIS — I25.10 CORONARY ARTERY DISEASE INVOLVING NATIVE CORONARY ARTERY OF NATIVE HEART WITHOUT ANGINA PECTORIS: ICD-10-CM

## 2019-08-26 DIAGNOSIS — K62.89 ANAL PAIN: ICD-10-CM

## 2019-08-26 PROCEDURE — 99213 OFFICE O/P EST LOW 20 MIN: CPT | Performed by: FAMILY MEDICINE

## 2019-08-26 RX ORDER — LISINOPRIL 5 MG/1
5 TABLET ORAL DAILY
Qty: 90 TABLET | Refills: 3 | Status: SHIPPED | OUTPATIENT
Start: 2019-08-26 | End: 2020-07-28

## 2019-08-26 RX ORDER — ATORVASTATIN CALCIUM 10 MG/1
10 TABLET, FILM COATED ORAL DAILY
Qty: 90 TABLET | Refills: 3 | Status: SHIPPED | OUTPATIENT
Start: 2019-08-26 | End: 2020-07-29

## 2019-08-26 NOTE — TELEPHONE ENCOUNTER
Sly Morton is requesting a refill of:    Refused Prescriptions:                       Disp   Refills    hydrocortisone (ANUSOL-HC) 2.5 % cream [Ph*30 g   0        Sig: apply to the anus topically twice daily as needed  Refused By: KADI LEONE  Reason for Refusal: Refill not appropriate  Reason for Refusal Comment: Refilled 08/14/19, refill too soon

## 2019-08-26 NOTE — NURSING NOTE
Dustin is here for fissure issues    Pre-visit Screening:  Immunizations:  up to date  Colonoscopy:  is up to date  Mammogram: NA  Asthma Action Test/Plan:  JOLENE  PHQ9:  NA  GAD7:  NA  Questioned patient about current smoking habits Pt. has never smoked.  Ok to leave detailed message on voice mail for today's visit only YEs, phone # [phone]

## 2019-08-26 NOTE — PROGRESS NOTES
MARY Morton is a 68 year old male who presents with complaint of painful BM intermittently present for 3 week(s).PT has hx anal fissure and ha been using cream but not completely resolving. NO blood. NO lump at rectum    Stool pattern: 2-3 per day  Stool Consistency: soft  Risk factors: no known risk factors     Past Medical History:   Diagnosis Date     Cardiomyopathy, ischemic      Coronary artery disease      Family history of diabetes mellitus     Father     Hyperlipidaemia      Mitral valve disorders(424.0)     mitral regurgitation     Old myocardial infarction 5/04    anterior     Past Surgical History:   Procedure Laterality Date     C EYE EXAM ESTABLISHED PT  2003     C REPAIR DETACH RETINA,SCLERAL BUCKLE  1967    Right eye     HC KNEE SCOPE, DIAGNOSTIC  8/1/2011    Right knee arthroscopy with partial medial meniscectomy     HEART CATH, ANGIOPLASTY  May 2004     intracoronary stent placement of mid LAD May 2004,     HEART CATH, ANGIOPLASTY  June 2004    elective staged stent of OM      Current Outpatient Medications   Medication Sig Dispense Refill     ASPIRIN 81 MG OR TABS 1 TABLET DAILY       atorvastatin (LIPITOR) 10 MG tablet Take 1 tablet (10 mg) by mouth daily 90 tablet 3     hydrocortisone (PROCTO-MED HC) 2.5 % cream apply to the anus twice daily as needed 30 g 0     latanoprost (XALATAN) 0.005 % ophthalmic solution Place 1 drop into the right eye At Bedtime   5     lisinopril (PRINIVIL/ZESTRIL) 5 MG tablet Take 1 tablet (5 mg) by mouth daily 90 tablet 3     Psyllium (METAMUCIL FIBER PO)          OBJECTIVE:  /70 (BP Location: Right arm, Patient Position: Sitting, Cuff Size: Adult Regular)   Pulse 54   Temp 98.4  F (36.9  C) (Oral)   Wt 66.4 kg (146 lb 6.4 oz)   SpO2 97%   BMI 21.62 kg/m    positive bowel sounds, soft, nontender, without hepatosplenomegaly  Rectal: no visible lesions, tender with digital exam in canal, no mass, stool guaiac negative    ASSESSMENT:/PLAN:  Rectal  pain-no clear mass, may be chronic fissure  Recommend colorectal eval  f/u if not improving or worsening  patient given instructions to go to emergency department immediately if worsening of symptoms and verbalizes this understanding

## 2019-08-28 ENCOUNTER — HOSPITAL ENCOUNTER (OUTPATIENT)
Dept: CARDIOLOGY | Facility: CLINIC | Age: 69
Discharge: HOME | End: 2019-08-28
Attending: PHYSICIAN ASSISTANT | Admitting: PHYSICIAN ASSISTANT
Payer: COMMERCIAL

## 2019-08-28 DIAGNOSIS — I42.9 CARDIOMYOPATHY, UNSPECIFIED TYPE (H): ICD-10-CM

## 2019-08-28 DIAGNOSIS — I25.10 CORONARY ARTERY DISEASE INVOLVING NATIVE CORONARY ARTERY OF NATIVE HEART WITHOUT ANGINA PECTORIS: ICD-10-CM

## 2019-08-28 PROCEDURE — 40000264 ECHOCARDIOGRAM COMPLETE

## 2019-08-28 PROCEDURE — 93306 TTE W/DOPPLER COMPLETE: CPT | Mod: 26 | Performed by: INTERNAL MEDICINE

## 2019-08-28 PROCEDURE — 25500064 ZZH RX 255 OP 636: Performed by: PHYSICIAN ASSISTANT

## 2019-08-28 RX ADMIN — HUMAN ALBUMIN MICROSPHERES AND PERFLUTREN 2 ML: 10; .22 INJECTION, SOLUTION INTRAVENOUS at 08:19

## 2019-09-03 ENCOUNTER — PRE VISIT (OUTPATIENT)
Dept: CARDIOLOGY | Facility: CLINIC | Age: 69
End: 2019-09-03

## 2019-09-06 ENCOUNTER — TRANSFERRED RECORDS (OUTPATIENT)
Dept: FAMILY MEDICINE | Facility: CLINIC | Age: 69
End: 2019-09-06

## 2019-09-09 ENCOUNTER — OFFICE VISIT (OUTPATIENT)
Dept: CARDIOLOGY | Facility: CLINIC | Age: 69
End: 2019-09-09
Payer: COMMERCIAL

## 2019-09-09 VITALS
HEIGHT: 69 IN | WEIGHT: 149 LBS | HEART RATE: 62 BPM | BODY MASS INDEX: 22.07 KG/M2 | SYSTOLIC BLOOD PRESSURE: 120 MMHG | DIASTOLIC BLOOD PRESSURE: 68 MMHG

## 2019-09-09 DIAGNOSIS — I25.10 CORONARY ARTERY DISEASE INVOLVING NATIVE CORONARY ARTERY OF NATIVE HEART WITHOUT ANGINA PECTORIS: Primary | Chronic | ICD-10-CM

## 2019-09-09 DIAGNOSIS — I25.5 CARDIOMYOPATHY, ISCHEMIC: ICD-10-CM

## 2019-09-09 DIAGNOSIS — I25.10 CORONARY ARTERY DISEASE INVOLVING NATIVE CORONARY ARTERY OF NATIVE HEART WITHOUT ANGINA PECTORIS: ICD-10-CM

## 2019-09-09 DIAGNOSIS — I42.9 CARDIOMYOPATHY, UNSPECIFIED TYPE (H): ICD-10-CM

## 2019-09-09 DIAGNOSIS — E78.00 PURE HYPERCHOLESTEROLEMIA: ICD-10-CM

## 2019-09-09 DIAGNOSIS — I34.0 NON-RHEUMATIC MITRAL REGURGITATION: ICD-10-CM

## 2019-09-09 LAB
ALT SERPL W P-5'-P-CCNC: 20 U/L (ref 0–70)
ANION GAP SERPL CALCULATED.3IONS-SCNC: 4 MMOL/L (ref 3–14)
BUN SERPL-MCNC: 20 MG/DL (ref 7–30)
CALCIUM SERPL-MCNC: 8.5 MG/DL (ref 8.5–10.1)
CHLORIDE SERPL-SCNC: 108 MMOL/L (ref 94–109)
CHOLEST SERPL-MCNC: 128 MG/DL
CO2 SERPL-SCNC: 31 MMOL/L (ref 20–32)
CREAT SERPL-MCNC: 0.82 MG/DL (ref 0.66–1.25)
GFR SERPL CREATININE-BSD FRML MDRD: >90 ML/MIN/{1.73_M2}
GLUCOSE SERPL-MCNC: 89 MG/DL (ref 70–99)
HDLC SERPL-MCNC: 64 MG/DL
LDLC SERPL CALC-MCNC: 56 MG/DL
NONHDLC SERPL-MCNC: 64 MG/DL
POTASSIUM SERPL-SCNC: 4.1 MMOL/L (ref 3.4–5.3)
SODIUM SERPL-SCNC: 143 MMOL/L (ref 133–144)
TRIGL SERPL-MCNC: 41 MG/DL

## 2019-09-09 PROCEDURE — 99213 OFFICE O/P EST LOW 20 MIN: CPT | Performed by: INTERNAL MEDICINE

## 2019-09-09 PROCEDURE — 36415 COLL VENOUS BLD VENIPUNCTURE: CPT | Performed by: PHYSICIAN ASSISTANT

## 2019-09-09 PROCEDURE — 80061 LIPID PANEL: CPT | Performed by: PHYSICIAN ASSISTANT

## 2019-09-09 PROCEDURE — 80048 BASIC METABOLIC PNL TOTAL CA: CPT | Performed by: PHYSICIAN ASSISTANT

## 2019-09-09 PROCEDURE — 84460 ALANINE AMINO (ALT) (SGPT): CPT | Performed by: PHYSICIAN ASSISTANT

## 2019-09-09 ASSESSMENT — MIFFLIN-ST. JEOR: SCORE: 1441.24

## 2019-09-09 NOTE — LETTER
9/9/2019    Eriberto Shetty MD  [address], [id number]  [facility] [address]    RE: Sly Morton       Dear Colleague,    I had the pleasure of seeing Sly Morton in the [facility].    HPI and Plan:   See dictation    Orders Placed This Encounter   Procedures     Basic metabolic panel     Lipid Profile     Follow-Up with Cardiac Advanced Practice Provider     Follow-Up with Cardiologist       No orders of the defined types were placed in this encounter.      There are no discontinued medications.      Encounter Diagnoses   Name Primary?     Coronary artery disease involving native coronary artery of native heart without angina pectoris Yes     Cardiomyopathy, ischemic      Pure hypercholesterolemia      Non-rheumatic mitral regurgitation        CURRENT MEDICATIONS:  Current Outpatient Medications   Medication Sig Dispense Refill     ASPIRIN 81 MG OR TABS 1 TABLET DAILY       atorvastatin (LIPITOR) 10 MG tablet Take 1 tablet (10 mg) by mouth daily 90 tablet 3     hydrocortisone (PROCTO-MED HC) 2.5 % cream apply to the anus twice daily as needed 30 g 0     latanoprost (XALATAN) 0.005 % ophthalmic solution Place 1 drop into the right eye At Bedtime   5     lisinopril (PRINIVIL/ZESTRIL) 5 MG tablet Take 1 tablet (5 mg) by mouth daily 90 tablet 3     Psyllium (METAMUCIL FIBER PO)          ALLERGIES     Allergies   Allergen Reactions     Cats      No Known Allergies        PAST MEDICAL HISTORY:  Past Medical History:   Diagnosis Date     Cardiomyopathy, ischemic      Coronary artery disease      Family history of diabetes mellitus     Father     Hyperlipidaemia      Mitral valve disorders(424.0)     mitral regurgitation     Old myocardial infarction 5/04    anterior       PAST SURGICAL HISTORY:  Past Surgical History:   Procedure Laterality Date     C EYE EXAM ESTABLISHED PT  2003     C REPAIR DETACH RETINA,SCLERAL BUCKLE  1967    Right eye     HC KNEE SCOPE, DIAGNOSTIC   8/1/2011    Right knee arthroscopy with partial medial meniscectomy     HEART CATH, ANGIOPLASTY  May 2004     intracoronary stent placement of mid LAD May 2004,     HEART CATH, ANGIOPLASTY  June 2004    elective staged stent of OM        FAMILY HISTORY:  Family History   Problem Relation Age of Onset     Diabetes Father      C.A.D. Father      Cancer - colorectal No family hx of      Prostate Cancer No family hx of        SOCIAL HISTORY:  Social History     Socioeconomic History     Marital status:      Spouse name: Yoni     Number of children: 2     Years of education: 18     Highest education level: None   Occupational History     Occupation: Teacher     Employer: [employer] [address]     Comment: retired   Social Needs     Financial resource strain: None     Food insecurity:     Worry: None     Inability: None     Transportation needs:     Medical: None     Non-medical: None   Tobacco Use     Smoking status: Never Smoker     Smokeless tobacco: Never Used   Substance and Sexual Activity     Alcohol use: No     Alcohol/week: 0.0 oz     Drug use: No     Sexual activity: Yes     Partners: Female   Lifestyle     Physical activity:     Days per week: None     Minutes per session: None     Stress: None   Relationships     Social connections:     Talks on phone: None     Gets together: None     Attends Church service: None     Active member of club or organization: None     Attends meetings of clubs or organizations: None     Relationship status: None     Intimate partner violence:     Fear of current or ex partner: None     Emotionally abused: None     Physically abused: None     Forced sexual activity: None   Other Topics Concern      Service Not Asked     Blood Transfusions Not Asked     Caffeine Concern Yes     Comment: 2-4 cups daily     Occupational Exposure Not Asked     Hobby Hazards Not Asked     Sleep Concern Not Asked     Stress Concern Not Asked     Weight Concern Not Asked      "Special Diet Yes     Comment: low fat, low cholesterol     Back Care Not Asked     Exercise Yes     Comment: 90 mins daily     Bike Helmet Not Asked     Seat Belt Yes     Self-Exams Not Asked   Social History Narrative     None       Review of Systems:  Skin:  Negative for       Eyes:  Positive for      ENT:  Negative for      Respiratory:  Negative for       Cardiovascular:  Negative      Gastroenterology: Negative for      Genitourinary:  not assessed      Musculoskeletal:  Negative for      Neurologic:  Positive for numbness or tingling of hands    Psychiatric:  Negative for      Heme/Lymph/Imm:  Negative for      Endocrine:  Negative for        Physical Exam:  Vitals: /68   Pulse 62   Ht 1.753 m (5' 9\")   Wt 67.6 kg (149 lb)   BMI 22.00 kg/m       Constitutional:  cooperative, alert and oriented, well developed, well nourished, in no acute distress        Skin:  warm and dry to the touch, no apparent skin lesions or masses noted          Head:  normocephalic, no masses or lesions        Eyes:  pupils equal and round, conjunctivae and lids unremarkable, sclera white, no xanthalasma, EOMS intact, no nystagmus        Lymph:      ENT:  no pallor or cyanosis, dentition good        Neck:  carotid pulses are full and equal bilaterally;no carotid bruit        Respiratory:  normal breath sounds, clear to auscultation, normal A-P diameter, normal symmetry, normal respiratory excursion, no use of accessory muscles         Cardiac: regular rhythm;no murmurs, gallops or rubs detected;normal S1 and S2                pulses full and equal                                        GI:           Extremities and Muscular Skeletal:  no edema;no spinal abnormalities noted;normal muscle strength and tone              Neurological:  no gross motor deficits        Psych:  affect appropriate, oriented to time, person and place        CC  Eriberto Shetty MD  [address], [id number]  [place], [place] " [address]                Thank you for allowing me to participate in the care of your patient.      Sincerely,     David Mensah MD     [facility]    cc:   Eriberto Shetty MD  [address] [address]

## 2019-09-09 NOTE — PROGRESS NOTES
Service Date: 09/09/2019      HISTORY OF PRESENT ILLNESS:  Sly is a very nice 69-year-old gentleman who is the model cardiac patient.  I met him in 2004 when he presented with an anterior wall myocardial infarction.  We stented his mid left anterior descending artery, in a staged fashion, we stented his obtuse marginal.  Ejection fraction was 35%-40% with 2-3+ mitral regurgitation.  More recent echocardiograms have been in the 40%-50% range with mitral regurgitation now in the trace to mild range.  Last evaluation of his coronary anatomy was a stress nuclear scan in 2012.  This demonstrated a small fixed anteroapical defect consistent with a nontransmural infarct and there was inferior diaphragmatic attenuation.  The ejection fraction by nuclear scan was 47%.      Dustin returns to the clinic and is doing great.  With assisted a year or so ago, he intensified his workout even more and lost 20 pounds of weight.  He has no chest, arm, neck, jaw or shoulder discomfort.  No dyspnea on exertion, orthopnea or PND.  No palpitations, lightheadedness, dizziness, syncope or near-syncope.  He was having some problems with orthostasis but states this is doing better.  After his heart attack, he would always exercise about an hour and a half every day.  Now that he is retired, he decided he needed to increase the intensity of his workouts and did so for about 11 months.  He states recently he has had to back off because of some orthopedic issues, but still follows a very healthy diet and exercises on a daily basis.  He does a combination of aerobic, resistance and stretching activity.      ASSESSMENT AND PLAN:  Sly appears to be doing quite well from a cardiac standpoint without clinical evidence of ischemia, heart failure or significant arrhythmia.      Followup echocardiogram this year again estimates his ejection fraction to be 40%-45% with mild mitral regurgitation.  Ejection fraction last year was estimated to be  more in the 35%-40% range and Delvin had a discussion with him about the possibility of a defibrillator.      I think down the road, I would do a stress echo on him again just to look for any silent ischemia.      I congratulated him on his healthy lifestyle and encouraged him to continue to do so.      I congratulated him on his additional weight loss.  His body mass index now is down to 22.  Blood pressure is 120/68 with a pulse of 62.      Fasting lipid profile is outstanding.  Total cholesterol is 128, HDL 64, LDL 56, triglycerides are 41.      Electrolytes were all within normal limits.      I will have him return to see Delvin in 1 year.  I will see him back in 2 years.  If he should have any problems, I would be glad to see him sooner.      MD ADI Agrawal MD, [facility]             D: 2019   T: 2019   MT: al      Name:     LENA MCGOWAN   MRN:      [id number]        Account:      [id number]   :      1950           Service Date: 2019      Document: [id number]

## 2019-09-09 NOTE — LETTER
9/9/2019      Eriberto Shetty MD  [address], [address]  [facility] [address]      RE: Sly FLANAGAN Praveen       Dear Colleague,    I had the pleasure of seeing Sly Morton in the [facility].    Service Date: 09/09/2019      HISTORY OF PRESENT ILLNESS:  Sly is a very nice 69-year-old gentleman who is the model cardiac patient.  I met him in 2004 when he presented with an anterior wall myocardial infarction.  We stented his mid left anterior descending artery, in a staged fashion, we stented his obtuse marginal.  Ejection fraction was 35%-40% with 2-3+ mitral regurgitation.  More recent echocardiograms have been in the 40%-50% range with mitral regurgitation now in the trace to mild range.  Last evaluation of his coronary anatomy was a stress nuclear scan in 2012.  This demonstrated a small fixed anteroapical defect consistent with a nontransmural infarct and there was inferior diaphragmatic attenuation.  The ejection fraction by nuclear scan was 47%.      Dustin returns to the clinic and is doing great.  With senior care a year or so ago, he intensified his workout even more and lost 20 pounds of weight.  He has no chest, arm, neck, jaw or shoulder discomfort.  No dyspnea on exertion, orthopnea or PND.  No palpitations, lightheadedness, dizziness, syncope or near-syncope.  He was having some problems with orthostasis but states this is doing better.  After his heart attack, he would always exercise about an hour and a half every day.  Now that he is retired, he decided he needed to increase the intensity of his workouts and did so for about 11 months.  He states recently he has had to back off because of some orthopedic issues, but still follows a very healthy diet and exercises on a daily basis.  He does a combination of aerobic, resistance and stretching activity.      ASSESSMENT AND PLAN:  Sly appears to be doing quite well from a cardiac standpoint without clinical  evidence of ischemia, heart failure or significant arrhythmia.      Followup echocardiogram this year again estimates his ejection fraction to be 40%-45% with mild mitral regurgitation.  Ejection fraction last year was estimated to be more in the 35%-40% range and Delvin had a discussion with him about the possibility of a defibrillator.      I think down the road, I would do a stress echo on him again just to look for any silent ischemia.      I congratulated him on his healthy lifestyle and encouraged him to continue to do so.      I congratulated him on his additional weight loss.  His body mass index now is down to 22.  Blood pressure is 120/68 with a pulse of 62.      Fasting lipid profile is outstanding.  Total cholesterol is 128, HDL 64, LDL 56, triglycerides are 41.      Electrolytes were all within normal limits.      I will have him return to see Delvin in 1 year.  I will see him back in 2 years.  If he should have any problems, I would be glad to see him sooner.      MD ADI Agrawal MD, [facility]             D: 2019   T: 2019   MT: al      Name:     LENA MCGOWAN   MRN:      [id number]        Account:      [id number]   :      1950           Service Date: 2019      Document: [id number]         Outpatient Encounter Medications as of 2019   Medication Sig Dispense Refill     ASPIRIN 81 MG OR TABS 1 TABLET DAILY       atorvastatin (LIPITOR) 10 MG tablet Take 1 tablet (10 mg) by mouth daily 90 tablet 3     hydrocortisone (PROCTO-MED HC) 2.5 % cream apply to the anus twice daily as needed 30 g 0     latanoprost (XALATAN) 0.005 % ophthalmic solution Place 1 drop into the right eye At Bedtime   5     lisinopril (PRINIVIL/ZESTRIL) 5 MG tablet Take 1 tablet (5 mg) by mouth daily 90 tablet 3     Psyllium (METAMUCIL FIBER PO)        No facility-administered encounter medications on file as of 2019.        Again, thank you for allowing me to participate in  the care of your patient.      Sincerely,    David Mensah MD     [facility]

## 2019-09-09 NOTE — PROGRESS NOTES
HPI and Plan:   See dictation    Orders Placed This Encounter   Procedures     Basic metabolic panel     Lipid Profile     Follow-Up with Cardiac Advanced Practice Provider     Follow-Up with Cardiologist       No orders of the defined types were placed in this encounter.      There are no discontinued medications.      Encounter Diagnoses   Name Primary?     Coronary artery disease involving native coronary artery of native heart without angina pectoris Yes     Cardiomyopathy, ischemic      Pure hypercholesterolemia      Non-rheumatic mitral regurgitation        CURRENT MEDICATIONS:  Current Outpatient Medications   Medication Sig Dispense Refill     ASPIRIN 81 MG OR TABS 1 TABLET DAILY       atorvastatin (LIPITOR) 10 MG tablet Take 1 tablet (10 mg) by mouth daily 90 tablet 3     hydrocortisone (PROCTO-MED HC) 2.5 % cream apply to the anus twice daily as needed 30 g 0     latanoprost (XALATAN) 0.005 % ophthalmic solution Place 1 drop into the right eye At Bedtime   5     lisinopril (PRINIVIL/ZESTRIL) 5 MG tablet Take 1 tablet (5 mg) by mouth daily 90 tablet 3     Psyllium (METAMUCIL FIBER PO)          ALLERGIES     Allergies   Allergen Reactions     Cats      No Known Allergies        PAST MEDICAL HISTORY:  Past Medical History:   Diagnosis Date     Cardiomyopathy, ischemic      Coronary artery disease      Family history of diabetes mellitus     Father     Hyperlipidaemia      Mitral valve disorders(424.0)     mitral regurgitation     Old myocardial infarction 5/04    anterior       PAST SURGICAL HISTORY:  Past Surgical History:   Procedure Laterality Date     C EYE EXAM ESTABLISHED PT  2003     C REPAIR DETACH RETINA,SCLERAL BUCKLE  1967    Right eye     HC KNEE SCOPE, DIAGNOSTIC  8/1/2011    Right knee arthroscopy with partial medial meniscectomy     HEART CATH, ANGIOPLASTY  May 2004     intracoronary stent placement of mid LAD May 2004,     HEART CATH, ANGIOPLASTY  June 2004    elective staged stent of OM         FAMILY HISTORY:  Family History   Problem Relation Age of Onset     Diabetes Father      C.A.D. Father      Cancer - colorectal No family hx of      Prostate Cancer No family hx of        SOCIAL HISTORY:  Social History     Socioeconomic History     Marital status:      Spouse name: Yoni     Number of children: 2     Years of education: 18     Highest education level: None   Occupational History     Occupation: Teacher     Employer: [employer]     Comment: retired   Social Needs     Financial resource strain: None     Food insecurity:     Worry: None     Inability: None     Transportation needs:     Medical: None     Non-medical: None   Tobacco Use     Smoking status: Never Smoker     Smokeless tobacco: Never Used   Substance and Sexual Activity     Alcohol use: No     Alcohol/week: 0.0 oz     Drug use: No     Sexual activity: Yes     Partners: Female   Lifestyle     Physical activity:     Days per week: None     Minutes per session: None     Stress: None   Relationships     Social connections:     Talks on phone: None     Gets together: None     Attends Restorationist service: None     Active member of club or organization: None     Attends meetings of clubs or organizations: None     Relationship status: None     Intimate partner violence:     Fear of current or ex partner: None     Emotionally abused: None     Physically abused: None     Forced sexual activity: None   Other Topics Concern      Service Not Asked     Blood Transfusions Not Asked     Caffeine Concern Yes     Comment: 2-4 cups daily     Occupational Exposure Not Asked     Hobby Hazards Not Asked     Sleep Concern Not Asked     Stress Concern Not Asked     Weight Concern Not Asked     Special Diet Yes     Comment: low fat, low cholesterol     Back Care Not Asked     Exercise Yes     Comment: 90 mins daily     Bike Helmet Not Asked     Seat Belt Yes     Self-Exams Not Asked   Social History Narrative     None  "      Review of Systems:  Skin:  Negative for       Eyes:  Positive for      ENT:  Negative for      Respiratory:  Negative for       Cardiovascular:  Negative      Gastroenterology: Negative for      Genitourinary:  not assessed      Musculoskeletal:  Negative for      Neurologic:  Positive for numbness or tingling of hands    Psychiatric:  Negative for      Heme/Lymph/Imm:  Negative for      Endocrine:  Negative for        Physical Exam:  Vitals: /68   Pulse 62   Ht 1.753 m (5' 9\")   Wt 67.6 kg (149 lb)   BMI 22.00 kg/m      Constitutional:  cooperative, alert and oriented, well developed, well nourished, in no acute distress        Skin:  warm and dry to the touch, no apparent skin lesions or masses noted          Head:  normocephalic, no masses or lesions        Eyes:  pupils equal and round, conjunctivae and lids unremarkable, sclera white, no xanthalasma, EOMS intact, no nystagmus        Lymph:      ENT:  no pallor or cyanosis, dentition good        Neck:  carotid pulses are full and equal bilaterally;no carotid bruit        Respiratory:  normal breath sounds, clear to auscultation, normal A-P diameter, normal symmetry, normal respiratory excursion, no use of accessory muscles         Cardiac: regular rhythm;no murmurs, gallops or rubs detected;normal S1 and S2                pulses full and equal                                        GI:           Extremities and Muscular Skeletal:  no edema;no spinal abnormalities noted;normal muscle strength and tone              Neurological:  no gross motor deficits        Psych:  affect appropriate, oriented to time, person and place          Eriberto Shetty MD  [address], [id number]  [place] [address]              "

## 2019-11-04 ENCOUNTER — TRANSFERRED RECORDS (OUTPATIENT)
Dept: FAMILY MEDICINE | Facility: CLINIC | Age: 69
End: 2019-11-04

## 2020-07-28 DIAGNOSIS — I25.10 CORONARY ARTERY DISEASE INVOLVING NATIVE CORONARY ARTERY OF NATIVE HEART WITHOUT ANGINA PECTORIS: ICD-10-CM

## 2020-07-28 RX ORDER — LISINOPRIL 5 MG/1
5 TABLET ORAL DAILY
Qty: 30 TABLET | Refills: 0 | COMMUNITY
Start: 2020-07-28 | End: 2020-07-29

## 2020-07-28 NOTE — TELEPHONE ENCOUNTER
Sly Morton is requesting a refill of:    Signed Prescriptions:                        Disp   Refills    lisinopril (ZESTRIL) 5 MG tablet           30 tab*0        Sig: Take 1 tablet (5 mg) by mouth daily  Authorizing Provider: DALLAS GIL  Ordering User: MAME LANDRY    Pt needs OV for refills

## 2020-07-29 DIAGNOSIS — I25.10 CORONARY ARTERY DISEASE INVOLVING NATIVE CORONARY ARTERY OF NATIVE HEART WITHOUT ANGINA PECTORIS: ICD-10-CM

## 2020-07-29 RX ORDER — ATORVASTATIN CALCIUM 10 MG/1
10 TABLET, FILM COATED ORAL DAILY
Qty: 90 TABLET | Refills: 0 | Status: SHIPPED | OUTPATIENT
Start: 2020-07-29 | End: 2020-11-25

## 2020-07-29 RX ORDER — LISINOPRIL 5 MG/1
5 TABLET ORAL DAILY
Qty: 90 TABLET | Refills: 0 | Status: SHIPPED | OUTPATIENT
Start: 2020-07-29 | End: 2020-11-25

## 2020-09-02 ENCOUNTER — TELEPHONE (OUTPATIENT)
Dept: CARDIOLOGY | Facility: CLINIC | Age: 70
End: 2020-09-02

## 2020-09-03 ENCOUNTER — TELEPHONE (OUTPATIENT)
Dept: FAMILY MEDICINE | Facility: CLINIC | Age: 70
End: 2020-09-03

## 2020-09-03 DIAGNOSIS — I25.10 CORONARY ARTERY DISEASE INVOLVING NATIVE CORONARY ARTERY OF NATIVE HEART WITHOUT ANGINA PECTORIS: Chronic | ICD-10-CM

## 2020-09-03 LAB
ANION GAP SERPL CALCULATED.3IONS-SCNC: 2 MMOL/L (ref 3–14)
BUN SERPL-MCNC: 18 MG/DL (ref 7–30)
CALCIUM SERPL-MCNC: 9 MG/DL (ref 8.5–10.1)
CHLORIDE SERPL-SCNC: 107 MMOL/L (ref 94–109)
CHOLEST SERPL-MCNC: 135 MG/DL
CO2 SERPL-SCNC: 30 MMOL/L (ref 20–32)
CREAT SERPL-MCNC: 0.94 MG/DL (ref 0.66–1.25)
GFR SERPL CREATININE-BSD FRML MDRD: 83 ML/MIN/{1.73_M2}
GLUCOSE SERPL-MCNC: 93 MG/DL (ref 70–99)
HDLC SERPL-MCNC: 67 MG/DL
LDLC SERPL CALC-MCNC: 59 MG/DL
NONHDLC SERPL-MCNC: 68 MG/DL
POTASSIUM SERPL-SCNC: 4.1 MMOL/L (ref 3.4–5.3)
SODIUM SERPL-SCNC: 139 MMOL/L (ref 133–144)
TRIGL SERPL-MCNC: 47 MG/DL

## 2020-09-03 PROCEDURE — 80061 LIPID PANEL: CPT | Performed by: INTERNAL MEDICINE

## 2020-09-03 PROCEDURE — 80048 BASIC METABOLIC PNL TOTAL CA: CPT | Performed by: INTERNAL MEDICINE

## 2020-09-03 PROCEDURE — 36415 COLL VENOUS BLD VENIPUNCTURE: CPT | Performed by: INTERNAL MEDICINE

## 2020-09-03 NOTE — TELEPHONE ENCOUNTER
Called the lab who did the blood draw and they said they are unable to add this on because they did not use the correct tube for this.

## 2020-09-03 NOTE — TELEPHONE ENCOUNTER
Dustin called to say that he has blood work done from his cardiologist today.  He is wondering if you would be able to add a PSA on to the blood that was drawn this morning.      Dr Diogenes Lamb is ok with adding this to the blood drawn from the cardiologist.  He is wondering if you could call there and add the order.  Thank you!

## 2020-09-09 ENCOUNTER — VIRTUAL VISIT (OUTPATIENT)
Dept: CARDIOLOGY | Facility: CLINIC | Age: 70
End: 2020-09-09
Attending: INTERNAL MEDICINE
Payer: COMMERCIAL

## 2020-09-09 DIAGNOSIS — I25.10 CORONARY ARTERY DISEASE INVOLVING NATIVE CORONARY ARTERY OF NATIVE HEART WITHOUT ANGINA PECTORIS: Chronic | ICD-10-CM

## 2020-09-09 PROCEDURE — 99213 OFFICE O/P EST LOW 20 MIN: CPT | Mod: 95 | Performed by: PHYSICIAN ASSISTANT

## 2020-09-09 NOTE — PROGRESS NOTES
"CARDIOLOGY TELEPHONE VISIT  This visit is being conducted as a virtual visit due to the emphasis on mitigation of the COVID-19 virus pandemic. The clinician has decided that the risk of an in-office visit outweighs the benefit for this patient.       Sly Morton is a 70 year old male who is being evaluated via a billable telephone visit.      The patient has been notified of following:     \"This telephone visit will be conducted via a call between you and your physician/provider. We have found that certain health care needs can be provided without the need for a physical exam.  This service lets us provide the care you need with a short phone conversation.  If a prescription is necessary we can send it directly to your pharmacy.  If lab work is needed we can place an order for that and you can then stop by our lab to have the test done at a later time.    Telephone visits are billed at different rates depending on your insurance coverage. During this emergency period, for some insurers they may be billed the same as an in-person visit.  Please reach out to your insurance provider with any questions.    If during the course of the call the physician/provider feels a telephone visit is not appropriate, you will not be charged for this service.\"    Patient has given verbal consent for Telephone visit?  Yes    What phone number would you like to be contacted at? [phone]    How would you like to obtain your AVS? Mail a copy    Review Of Systems  Skin: negative  Eyes: negative  Ears/Nose/Throat: negative  Respiratory: No shortness of breath, dyspnea on exertion, cough, or hemoptysis  Cardiovascular: negative  Gastrointestinal: negative  Genitourinary: negative  Musculoskeletal: negative  Neurologic: negative  Psychiatric: negative  Hematologic/Lymphatic/Immunologic: negative  Endocrine: negative    Tamia Mcghee CMA 9/9/2020        Primary Cardiologist:  Dr. Mensah    HPI:  Sly Morton is a 70 year old male " with past medical history including coronary artery disease dating back to 2004 when he presented with an anterior wall myocardial infarction.  He underwent stenting of his mid LAD.  In a staged fashion, he underwent stenting of the OM.  Ejection fraction was 35-40% with 2-3+ mitral regurgitation.  His last evaluation of his coronary anatomy was with a stress nuclear scan in 08/2012.  This demonstrated a small fixed anteroapical defect consistent with a nontransmural infarct, and there was inferior diaphragmatic attenuation.  Ejection fraction was estimated to be 47%.   Echocardiogram 8/28/18: LVEF 35-40% with trace to mild mitral regurgitation, unchanged from 2016.   Echo 8/28/19: LVEF 40-45%, mild MR.         Interval History:   Sly presents today (by telephone visit) for his annual followup.  He tells me he continues to do very well from a cardiac standpoint without any chest pain, shortness of breath, edema, dizziness, syncope.      Some mild ROBBINS with stairs.   But does work out daily.  He does stretching for 30 minutes followed elliptical daily for about 20 minutes. Tries to do intervals. No sxs with this. He also does pushups, kettle ball, core strength.   He works hard on a healthy diet, though does allow some treats and snacks at some times.  Sly was seen by his Primary Care provider this summer as well.        I have reviewed and updated the patient's Past Medical History, Social History, Family History and Medication List.      MEDICATIONS:  Current Outpatient Medications   Medication Sig Dispense Refill     ASPIRIN 81 MG OR TABS 1 TABLET DAILY       atorvastatin (LIPITOR) 10 MG tablet Take 1 tablet (10 mg) by mouth daily 90 tablet 0     hydrocortisone (PROCTO-MED HC) 2.5 % cream apply to the anus twice daily as needed 30 g 0     latanoprost (XALATAN) 0.005 % ophthalmic solution Place 1 drop into the right eye At Bedtime   5     lisinopril (ZESTRIL) 5 MG tablet Take 1 tablet (5 mg) by mouth daily 90  tablet 0     Psyllium (METAMUCIL FIBER PO)            ALLERGIES:   Allergies   Allergen Reactions     Cats      No Known Allergies          DIAGNOSTICS:  Component      Latest Ref Rng & Units 9/3/2020   Sodium      133 - 144 mmol/L 139   Potassium      3.4 - 5.3 mmol/L 4.1   Chloride      94 - 109 mmol/L 107   Carbon Dioxide      20 - 32 mmol/L 30   Anion Gap      3 - 14 mmol/L 2 (L)   Glucose      70 - 99 mg/dL 93   Urea Nitrogen      7 - 30 mg/dL 18   Creatinine      0.66 - 1.25 mg/dL 0.94   GFR Estimate      >60 mL/min/1.73:m2 83   GFR Estimate If Black      >60 mL/min/1.73:m2 >90   Calcium      8.5 - 10.1 mg/dL 9.0   Cholesterol      <200 mg/dL 135   Triglycerides      <150 mg/dL 47   HDL Cholesterol      >39 mg/dL 67   LDL Cholesterol Calculated      <100 mg/dL 59   Non HDL Cholesterol      <130 mg/dL 68       Echo 8/28/19:  Interpretation Summary  The left ventricle is normal in size. There is normal left ventricular wall  thickness. Left ventricular systolic function is mild to moderately reduced.  The visual ejection fraction is estimated at 40-45%. LVEF 42% based on biplane  2D tracing. Grade I or early diastolic dysfunction. There is mild-moderate  global hypokinesia of the left ventricle. There is no thrombus seen in the  left ventricle  The right ventricle is borderline dilated. Mildly decreased right ventricular  systolic function.  There is moderate biatrial enlargement. 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  Mild mitral and tricuspid regurgitation.  No pericardial effusion.  In direct comparison to the previous study dated 08/28/2018, left ventricular  systolic function appears similar.        ASSESSMENT/PLAN:  Sly is a very pleasant, 70-year-old gentleman with a history of coronary artery disease and cardiomyopathy.  He also has a history of mitral regurgitation.      Sly continues to do very well from a cardiac standpoint without any clinical evidence of ischemia, heart failure or significant  arrhythmia.       His echo in 2019 shows LVEF 40-45%, mild MR.  This is stable, or actually a bit improved since 2016.       Repeat fasting lipid panel 9/3/20 shows , HDL 67, LDL 59, TG 47.        BMP 9/3/20 is unremarkable.     He will continue his current medication regimen.  Reports he does not need refills.       Follow up in 1 year with Dr. Mensah with repeat fasting lipid panel, basic metabolic panel.   Dr. Mensah does want a stress echo next year.           Thank you very much for allowing me to participate in the care of Sly Morton.       I have reviewed the note as documented above.  This accurately captures the substance of my conversation with the patient.      Phone call contact time  Call Started at 0910  Call Ended at 0923      Delvin Dougherty PA-C  [facility]

## 2020-09-09 NOTE — PATIENT INSTRUCTIONS
Your labs look fantastic.   Continue your excellent routine of daily exercise and a heart healthy diet.   See Dr. Mensah in 1 year.  He does wants fasting labs and a stress echo next year.    Call if any concerns or questions.

## 2020-11-25 DIAGNOSIS — I25.10 CORONARY ARTERY DISEASE INVOLVING NATIVE CORONARY ARTERY OF NATIVE HEART WITHOUT ANGINA PECTORIS: ICD-10-CM

## 2020-11-25 RX ORDER — LISINOPRIL 5 MG/1
5 TABLET ORAL DAILY
Qty: 90 TABLET | Refills: 3 | Status: SHIPPED | OUTPATIENT
Start: 2020-11-25 | End: 2021-11-02

## 2020-11-25 RX ORDER — ATORVASTATIN CALCIUM 10 MG/1
10 TABLET, FILM COATED ORAL DAILY
Qty: 90 TABLET | Refills: 3 | Status: SHIPPED | OUTPATIENT
Start: 2020-11-25 | End: 2021-11-02

## 2021-01-09 ENCOUNTER — HEALTH MAINTENANCE LETTER (OUTPATIENT)
Age: 71
End: 2021-01-09

## 2021-01-13 ENCOUNTER — TELEPHONE (OUTPATIENT)
Dept: FAMILY MEDICINE | Facility: CLINIC | Age: 71
End: 2021-01-13

## 2021-01-13 NOTE — TELEPHONE ENCOUNTER
Pt called stating he is having a lot of dry chapped skin under his lower lip. Left pt voicemail stating it could be from the cold, licking, or wind chapped. Advised him to buy OTC Aquaphor and apply daily.

## 2021-01-21 NOTE — TELEPHONE ENCOUNTER
Pt called to say the aquifer did wonders. He feels that is is inflammation to his lip.Pt does not want to come in so I gave the option of a virtual visit. Transferred to the front.

## 2021-01-22 ENCOUNTER — OFFICE VISIT (OUTPATIENT)
Dept: FAMILY MEDICINE | Facility: CLINIC | Age: 71
End: 2021-01-22

## 2021-01-22 DIAGNOSIS — L72.9 INFECTED CYST OF SKIN: Primary | ICD-10-CM

## 2021-01-22 DIAGNOSIS — L08.9 INFECTED CYST OF SKIN: Primary | ICD-10-CM

## 2021-01-22 PROCEDURE — 99213 OFFICE O/P EST LOW 20 MIN: CPT | Mod: GT | Performed by: PHYSICIAN ASSISTANT

## 2021-01-22 RX ORDER — CEPHALEXIN 500 MG/1
500 CAPSULE ORAL 3 TIMES DAILY
Qty: 21 CAPSULE | Refills: 0 | Status: SHIPPED | OUTPATIENT
Start: 2021-01-22 | End: 2021-01-29

## 2021-01-22 NOTE — PROGRESS NOTES
This visit was completed via telemedicine using Zoom. Patient and I completed history, ROS, and HPI via virtual encounter with video/sound.       There was no physical examination done due to telemedicine appointment.     CC: Skin change    History:  Skin change:  Started noticing a change to skin near mouth on left lower lip 3-4 weeks ago around [employer]. Appeared like an ingrown hair, with some redness, tenderness. He shaved this area,and this seemed to break skin open. Has been trying bacitracin, where it seemed to get better, but was dry, so was advised to take Aquaphor which helped with this.     Recently in the past few days, another pimple head seems to have formed and seems deeper beneath the surface. Is somewhat tender. Did just recently start trying to apply heat which has been soothing. No fever, sweats, chills, spreading redness.    PMH, MEDICATIONS, ALLERGIES, SOCIAL AND FAMILY HISTORY in EPIC and reviewed by me personally.    ROS negative other than the symptoms noted above in the HPI.      Examination   There were no vitals taken for this visit.     Constitutional: Sitting comfortably, in no acute distress. Vital signs noted  SKIN: No jaundice/pallor. Erythematous cystic lesion near left lower lip. Approximately 1.5 cm by 1.5 cm with pustule center. Mild surrounding erythema.   Psychiatric: mentation appears normal and affect normal/bright        A/P    ICD-10-CM    1. Infected cyst of skin  L72.9 cephALEXin (KEFLEX) 500 MG capsule    L08.9        DISCUSSION:  Consistent with infected cyst/follicle. Reassured by no warning symptoms. Recommended he continue to use bacitracin/aquaphor as needed. Emphasized that he should continue to heat 3-4 times daily 15 minutes. If not improving over the next 2 days, recommended he start an antibiotic. Will send 7 day course of Keflex to pharmacy to take TID with food. Warned of side effects, and to contact us if noted. If still not resolved by end of  antibiotic, should be scheduled for in person OV. Proceed to ER with significant worsening, fever, sweats, chills.     follow up visit: As needed    Time of visit: 15 minutes     Ann Graves PA-C  [facility]

## 2021-01-29 ENCOUNTER — OFFICE VISIT (OUTPATIENT)
Dept: FAMILY MEDICINE | Facility: CLINIC | Age: 71
End: 2021-01-29

## 2021-01-29 VITALS
SYSTOLIC BLOOD PRESSURE: 124 MMHG | OXYGEN SATURATION: 98 % | TEMPERATURE: 97.9 F | HEART RATE: 52 BPM | BODY MASS INDEX: 24.47 KG/M2 | WEIGHT: 165.2 LBS | DIASTOLIC BLOOD PRESSURE: 70 MMHG | HEIGHT: 69 IN

## 2021-01-29 DIAGNOSIS — B00.9 HERPES SIMPLEX VIRUS INFECTION: Primary | ICD-10-CM

## 2021-01-29 PROCEDURE — 99213 OFFICE O/P EST LOW 20 MIN: CPT | Performed by: FAMILY MEDICINE

## 2021-01-29 RX ORDER — VALACYCLOVIR HYDROCHLORIDE 1 G/1
1000 TABLET, FILM COATED ORAL 3 TIMES DAILY
Qty: 21 TABLET | Refills: 0 | Status: SHIPPED | OUTPATIENT
Start: 2021-01-29 | End: 2021-02-16

## 2021-01-29 ASSESSMENT — MIFFLIN-ST. JEOR: SCORE: 1509.72

## 2021-01-29 NOTE — PROGRESS NOTES
"SUBJECTIVE:  Oral lesion on lip for on/off for 4 weeks seen virtually Rx keflex without benefit  Minimal pain  No F/C  On other oral lesions    Patient Active Problem List   Diagnosis     Sciatica     Family history of diabetes mellitus     CATARACT NEC Right      Health Care Home     ACP (advance care planning)     Coronary artery disease involving native coronary artery of native heart without angina pectoris     Cardiomyopathy, ischemic     Pure hypercholesterolemia     Non-rheumatic mitral regurgitation     ST elevation myocardial infarction involving left anterior descending (LAD) coronary artery (H)     Current Outpatient Medications   Medication Instructions     ASPIRIN 81 MG OR TABS 1 TABLET DAILY     atorvastatin (LIPITOR) 10 mg, Oral, DAILY     cephALEXin (KEFLEX) 500 mg, Oral, 3 TIMES DAILY     hydrocortisone (PROCTO-MED HC) 2.5 % cream apply to the anus twice daily as needed     latanoprost (XALATAN) 0.005 % ophthalmic solution 1 drop, Right Eye, AT BEDTIME     lisinopril (ZESTRIL) 5 mg, Oral, DAILY     Psyllium (METAMUCIL FIBER PO) Oral     valACYclovir (VALTREX) 1,000 mg, Oral, 3 TIMES DAILY      ROS: 10 point ROS neg other than the symptoms noted above in the HPI.    /70 (BP Location: Right arm, Patient Position: Sitting, Cuff Size: Adult Large)   Pulse 52   Temp 97.9  F (36.6  C) (Oral)   Ht 1.753 m (5' 9\")   Wt 74.9 kg (165 lb 3.2 oz)   SpO2 98%   BMI 24.40 kg/m    GENERAL: no apparent distress  EYES: Conjunctiva are not injected, no discharge.  EARS: Left TM -no erythema, no effusion,  not bulged.               Right TM -no erythema, no effusion,  not bulged.  NOSE: no discharge, no sinus tenderness  THROAT: no erythema, no exudate, no lesions  NECK: supple, no adenopathy.    SKIN: swollen lower lip right side near midline redness present hint of a blister  Mildly tender    (B00.9) Herpes simplex virus infection  (primary encounter diagnosis)  Comment:   Plan: valACYclovir (VALTREX) " 1000 mg tablet          RTC if not better

## 2021-01-29 NOTE — NURSING NOTE
Sly is here for possible infection on his left lip. Started around denae time.    Pre-Visit Screening:  Immunizations:UTD  Colonoscopy:NA  Mammogram:NA  Asthma Action Test/Plan:NA  PHQ9:NA  GAD7:NA  Questioned patient about current smoking habits Pt. Never smoked  OK to leave a detailed message on voice mail for today's visit yes, phone # [phone]

## 2021-02-03 ENCOUNTER — OFFICE VISIT (OUTPATIENT)
Dept: FAMILY MEDICINE | Facility: CLINIC | Age: 71
End: 2021-02-03

## 2021-02-03 VITALS
HEIGHT: 69 IN | RESPIRATION RATE: 18 BRPM | BODY MASS INDEX: 24.73 KG/M2 | WEIGHT: 167 LBS | TEMPERATURE: 97.7 F | SYSTOLIC BLOOD PRESSURE: 120 MMHG | DIASTOLIC BLOOD PRESSURE: 74 MMHG | HEART RATE: 56 BPM | OXYGEN SATURATION: 97 %

## 2021-02-03 DIAGNOSIS — M67.40 GANGLION OF JOINT: Primary | ICD-10-CM

## 2021-02-03 DIAGNOSIS — L60.8 TOENAIL DEFORMITY: ICD-10-CM

## 2021-02-03 DIAGNOSIS — M77.12 LATERAL EPICONDYLITIS OF LEFT ELBOW: ICD-10-CM

## 2021-02-03 PROCEDURE — 99215 OFFICE O/P EST HI 40 MIN: CPT | Performed by: FAMILY MEDICINE

## 2021-02-03 ASSESSMENT — MIFFLIN-ST. JEOR: SCORE: 1517.89

## 2021-02-03 NOTE — NURSING NOTE
Sly is here today for a bump on his pointer finger, toenail fungus, and elbow pain.    Pre-visit Screening:  Immunizations:  up to date  Colonoscopy:  is up to date  Mammogram: NA  Asthma Action Test/Plan:  NA  PHQ9:  NA  GAD7:  NA  Questioned patient about current smoking habits Pt. has never smoked.  Ok to leave detailed message on voice mail for today's visit only Yes, phone # [phone]

## 2021-02-03 NOTE — PROGRESS NOTES
Multiple issues    1. Finger, left pointer finger  2. Left elbow pain for 1 week/ playing [employer]  3. September 2020 noted a toenail issue/ started using Fungi-nail OTC.     3 notes    1. SUBJECTIVE: 70 year old male complaining of left finger painless bump for 2 week(s).   The patient describes no injury just noticed it.   The patient denies a history of injury but uses his hands all day long.   Smoking history: No.   Relevant past medical history: positive for hypertension, elevated cholesterol followed by cardiology.    OBJECTIVE: The patient appears healthy, alert, no distress, cooperative and smiling.   EXT: The hands reveal normal motor, sensory, vascular and tendon function.  Left second pointer finger dorsal PIP joint ganglion 0.25 cm, mobile, soft and not tender  See next 2 notes      ASSESSMENT:(M67.40) Ganglion of joint  (primary encounter diagnosis)  Comment: small benign entity/ monitor for change  Plan: Consider hand surgery in the future/ possible resolution on its own reviewed    (M77.12) Lateral epicondylitis of left elbow  Comment: read handout/    This condition has been fully explained to the patient, who indicates understanding. Treatment plan:  Ice  Rehab stretches/ exercises to begin immediately and given in writing with illustrations.    tennis elbow splint recommended   rest the arm as much as practical  use of heat and/or ice may be helpful prn  NSAID's as needed  consider steroid injection if not improved  call as needed      (L60.8) Toenail deformity  Comment: probable shoe contusion distal nail changes/ not fungus  Plan: change shoe wear. Reviewed OTC medications available for fungus          2. SUBJECTIVE:   The patient complains of left lateral epicondylar pain for 1 months. Occurs while lifting or gripping or otherwise using the extremity. Precipitating factors: no direct injury, but uses arm a lot for push ups and keyboarding.    OBJECTIVE:  Point tenderness over lateral epicondyle  with positive Cozen's test. Elbow, wrist and hand otherwise normal.      3. RASH    Location:left fourth toenail changes / moved to right third and fourth nail    When:last 3 months    any causative agent ?:started running in tight water shoes indoors on a treadmill/ has noticed toe pressure with each stride    any other chronic skin diseases?: dry skin    Description: Distal 3 toes nail lifted off the nail bed slightly with white discoloration. No nail thickening. Clear proximal nail    pt use of Meds:OTC Gonzales-nail with tea tree oil

## 2021-02-03 NOTE — PATIENT INSTRUCTIONS
Ganglion of joint  (primary encounter diagnosis)  Comment: small benign entity/ monitor for change  Plan: Consider hand surgery in the future/ possible resolution on its own reviewed    (M77.12) Lateral epicondylitis of left elbow  Comment: read handout/    This condition has been fully explained to the patient, who indicates understanding. Treatment plan:  Ice  Rehab stretches/ exercises to begin immediately and given in writing with illustrations.    tennis elbow splint recommended   rest the arm as much as practical  use of heat and/or ice may be helpful prn  NSAID's as needed  consider steroid injection if not improved  call as needed      (L60.8) Toenail deformity  Comment: probable shoe contusion distal nail changes/ not fungus  Plan: change shoe wear. Reviewed OTC medications available for fungus

## 2021-02-16 DIAGNOSIS — B00.9 HERPES SIMPLEX VIRUS INFECTION: ICD-10-CM

## 2021-02-16 RX ORDER — VALACYCLOVIR HYDROCHLORIDE 1 G/1
1000 TABLET, FILM COATED ORAL 3 TIMES DAILY
Qty: 21 TABLET | Refills: 1 | Status: SHIPPED | OUTPATIENT
Start: 2021-02-16 | End: 2023-06-12

## 2021-02-16 NOTE — TELEPHONE ENCOUNTER
Sly Morton is requesting a refill of:    Pending Prescriptions:                       Disp   Refills    valACYclovir (VALTREX) 1000 mg tablet     21 tab*1            Sig: Take 1 tablet (1,000 mg) by mouth 3 times daily    Please close encounter if RX was sent. Thanks, Ashley    Pt had many questions about his medication. Not sure if he needs to go an a daily suppressive. Has not had a cold sore in some time.   He would like to have refills on file and if he gets more he will make an OV

## 2021-03-11 ENCOUNTER — IMMUNIZATION (OUTPATIENT)
Dept: NURSING | Facility: CLINIC | Age: 71
End: 2021-03-11
Payer: COMMERCIAL

## 2021-03-11 PROCEDURE — 91300 PR COVID VAC PFIZER DIL RECON 30 MCG/0.3 ML IM: CPT

## 2021-03-11 PROCEDURE — 0001A PR COVID VAC PFIZER DIL RECON 30 MCG/0.3 ML IM: CPT

## 2021-04-01 ENCOUNTER — IMMUNIZATION (OUTPATIENT)
Dept: NURSING | Facility: CLINIC | Age: 71
End: 2021-04-01
Attending: INTERNAL MEDICINE
Payer: COMMERCIAL

## 2021-04-01 PROCEDURE — 91300 PR COVID VAC PFIZER DIL RECON 30 MCG/0.3 ML IM: CPT

## 2021-04-01 PROCEDURE — 0002A PR COVID VAC PFIZER DIL RECON 30 MCG/0.3 ML IM: CPT

## 2021-09-17 ENCOUNTER — PRE VISIT (OUTPATIENT)
Dept: CARDIOLOGY | Facility: CLINIC | Age: 71
End: 2021-09-17

## 2021-09-17 DIAGNOSIS — I25.10 CORONARY ARTERY DISEASE INVOLVING NATIVE CORONARY ARTERY OF NATIVE HEART WITHOUT ANGINA PECTORIS: Primary | Chronic | ICD-10-CM

## 2021-09-17 DIAGNOSIS — I25.5 CARDIOMYOPATHY, ISCHEMIC: ICD-10-CM

## 2021-09-22 ENCOUNTER — LAB (OUTPATIENT)
Dept: LAB | Facility: CLINIC | Age: 71
End: 2021-09-22
Payer: COMMERCIAL

## 2021-09-22 ENCOUNTER — HOSPITAL ENCOUNTER (OUTPATIENT)
Dept: CARDIOLOGY | Facility: CLINIC | Age: 71
Discharge: HOME | End: 2021-09-22
Attending: INTERNAL MEDICINE | Admitting: INTERNAL MEDICINE
Payer: COMMERCIAL

## 2021-09-22 DIAGNOSIS — I25.10 CORONARY ARTERY DISEASE INVOLVING NATIVE CORONARY ARTERY OF NATIVE HEART WITHOUT ANGINA PECTORIS: Chronic | ICD-10-CM

## 2021-09-22 DIAGNOSIS — I25.5 CARDIOMYOPATHY, ISCHEMIC: ICD-10-CM

## 2021-09-22 LAB
ALT SERPL W P-5'-P-CCNC: 30 U/L (ref 0–70)
ANION GAP SERPL CALCULATED.3IONS-SCNC: 4 MMOL/L (ref 3–14)
BUN SERPL-MCNC: 21 MG/DL (ref 7–30)
CALCIUM SERPL-MCNC: 8.2 MG/DL (ref 8.5–10.1)
CHLORIDE BLD-SCNC: 108 MMOL/L (ref 94–109)
CHOLEST SERPL-MCNC: 127 MG/DL
CO2 SERPL-SCNC: 28 MMOL/L (ref 20–32)
CREAT SERPL-MCNC: 0.91 MG/DL (ref 0.66–1.25)
FASTING STATUS PATIENT QL REPORTED: YES
GFR SERPL CREATININE-BSD FRML MDRD: 86 ML/MIN/1.73M2
GLUCOSE BLD-MCNC: 90 MG/DL (ref 70–99)
HDLC SERPL-MCNC: 64 MG/DL
LDLC SERPL CALC-MCNC: 57 MG/DL
NONHDLC SERPL-MCNC: 63 MG/DL
POTASSIUM BLD-SCNC: 4.2 MMOL/L (ref 3.4–5.3)
SODIUM SERPL-SCNC: 140 MMOL/L (ref 133–144)
TRIGL SERPL-MCNC: 32 MG/DL

## 2021-09-22 PROCEDURE — 93350 STRESS TTE ONLY: CPT | Mod: TC

## 2021-09-22 PROCEDURE — 93325 DOPPLER ECHO COLOR FLOW MAPG: CPT | Mod: 26 | Performed by: INTERNAL MEDICINE

## 2021-09-22 PROCEDURE — 93016 CV STRESS TEST SUPVJ ONLY: CPT | Performed by: INTERNAL MEDICINE

## 2021-09-22 PROCEDURE — 80061 LIPID PANEL: CPT | Performed by: INTERNAL MEDICINE

## 2021-09-22 PROCEDURE — 80048 BASIC METABOLIC PNL TOTAL CA: CPT | Performed by: INTERNAL MEDICINE

## 2021-09-22 PROCEDURE — 84460 ALANINE AMINO (ALT) (SGPT): CPT | Performed by: INTERNAL MEDICINE

## 2021-09-22 PROCEDURE — 93325 DOPPLER ECHO COLOR FLOW MAPG: CPT | Mod: TC

## 2021-09-22 PROCEDURE — 93018 CV STRESS TEST I&R ONLY: CPT | Performed by: INTERNAL MEDICINE

## 2021-09-22 PROCEDURE — 93350 STRESS TTE ONLY: CPT | Mod: 26 | Performed by: INTERNAL MEDICINE

## 2021-09-22 PROCEDURE — 93321 DOPPLER ECHO F-UP/LMTD STD: CPT | Mod: 26 | Performed by: INTERNAL MEDICINE

## 2021-09-22 PROCEDURE — 36415 COLL VENOUS BLD VENIPUNCTURE: CPT | Performed by: INTERNAL MEDICINE

## 2021-09-29 ENCOUNTER — OFFICE VISIT (OUTPATIENT)
Dept: CARDIOLOGY | Facility: CLINIC | Age: 71
End: 2021-09-29
Payer: COMMERCIAL

## 2021-09-29 VITALS
SYSTOLIC BLOOD PRESSURE: 120 MMHG | HEART RATE: 48 BPM | OXYGEN SATURATION: 95 % | DIASTOLIC BLOOD PRESSURE: 70 MMHG | WEIGHT: 153 LBS | BODY MASS INDEX: 22.66 KG/M2 | HEIGHT: 69 IN

## 2021-09-29 DIAGNOSIS — I25.5 CARDIOMYOPATHY, ISCHEMIC: ICD-10-CM

## 2021-09-29 DIAGNOSIS — I25.10 CORONARY ARTERY DISEASE INVOLVING NATIVE CORONARY ARTERY OF NATIVE HEART WITHOUT ANGINA PECTORIS: Primary | Chronic | ICD-10-CM

## 2021-09-29 DIAGNOSIS — E78.00 PURE HYPERCHOLESTEROLEMIA: ICD-10-CM

## 2021-09-29 DIAGNOSIS — I34.0 NON-RHEUMATIC MITRAL REGURGITATION: ICD-10-CM

## 2021-09-29 PROCEDURE — 99214 OFFICE O/P EST MOD 30 MIN: CPT | Performed by: INTERNAL MEDICINE

## 2021-09-29 ASSESSMENT — MIFFLIN-ST. JEOR: SCORE: 1449.38

## 2021-09-29 NOTE — PROGRESS NOTES
Service Date: 09/29/2021    HISTORY OF PRESENT ILLNESS:  Mr. Morton is a very nice 71-year-old gentleman who is a model cardiac patient.  I met him in 2004 when he presented with a large anterior wall myocardial infarction and we stented his mid left anterior descending artery.  In a staged fashion, we stented his obtuse marginal.  Ejection fraction was 35%-40% with 2 to 3+ mitral regurgitation.  More recent echocardiograms have been in the 40%-50% range.  Mitral regurgitation now is down to trace to mild range.  As his last stress test was a stress nuclear scan in 2012, we repeated his stress test this year.    Dustin returns to clinic stating he is doing great.  When he retired, he intensified his workout even more.  He lost 20 pounds of weight.  He has no chest, arm, neck, jaw or shoulder discomfort.  No dizziness, lightheadedness, syncope or near syncope.  After his heart attack, he would exercise an hour and a half every day.  After we talked last time, he has added stretching to his regimen, and he states that has dramatically improved his quality of life and his ability to exercise.    ASSESSMENT AND PLAN:  Dustin appears to be doing quite well from a cardiac standpoint without clinical evidence of ischemia.  As stated, we did a stress echocardiogram on him this year for which he was able to exercise 10 minutes without symptoms or EKG changes.  Obviously, he had echocardiographic evidence of his old infarct but no evidence of any ischemia.  Baseline ejection fraction was felt to be 35%-40%, but with exercise it was thought to be 45%-50%.  There is mitral regurgitation that was described as mild.    Dustin has no symptoms to suggest heart failure, despite his ischemic cardiomyopathy.  He has no symptoms of any arrhythmia and no significant arrhythmia was seen on his stress echocardiogram.    Blood pressure is very well controlled at 120/70 with a pulse of 48.    Weight is 153 pounds, giving him a body mass index  of 22.6.    Fasting lipid profile is outstanding.  Total cholesterol is 127, HDL 64, LDL 57, triglycerides are 32.  We discussed the various numbers and what they mean.  This is on 10 mg of atorvastatin.    Basic metabolic profile demonstrates normal creatinine and normal electrolytes.  Glucose is also normal.    I congratulated him on his healthy lifestyle and encouraged him to continue to do so.    He will follow up with my SELENA in 1 year.  I will see him back in 2 years.  If he should have any problems, I would be glad to see him sooner.    Thank you for allowing me to participate in his care.  Over 30 minutes was spent with Dustin today.    David Mensah MD, [facility]        D: 2021   T: 2021   MT: gigi    Name:     LENA MCGOWANCasa  MRN:      [id number]        Account:      [id number]   :      1950           Service Date: 2021       Document: [id number]

## 2021-09-29 NOTE — LETTER
9/29/2021    Eriberto Shetty MD  [address], [address]  [facility] [address]    RE: Sly Morton       Dear Colleague,    I had the pleasure of seeing Sly Morton in the [facility].    HPI and Plan:   See dictation    Orders Placed This Encounter   Procedures     Basic metabolic panel     Lipid Profile     Follow-Up with Cardiac Advanced Practice Provider     Follow-Up with Cardiologist       No orders of the defined types were placed in this encounter.      There are no discontinued medications.      Encounter Diagnoses   Name Primary?     Coronary artery disease involving native coronary artery of native heart without angina pectoris Yes     Cardiomyopathy, ischemic      Pure hypercholesterolemia      Non-rheumatic mitral regurgitation        CURRENT MEDICATIONS:  Current Outpatient Medications   Medication Sig Dispense Refill     ASPIRIN 81 MG OR TABS 1 TABLET DAILY       atorvastatin (LIPITOR) 10 MG tablet Take 1 tablet (10 mg) by mouth daily 90 tablet 3     hydrocortisone (PROCTO-MED HC) 2.5 % cream apply to the anus twice daily as needed 30 g 0     latanoprost (XALATAN) 0.005 % ophthalmic solution Place 1 drop into the right eye At Bedtime   5     lisinopril (ZESTRIL) 5 MG tablet Take 1 tablet (5 mg) by mouth daily 90 tablet 3     Psyllium (METAMUCIL FIBER PO) Take by mouth daily        valACYclovir (VALTREX) 1000 mg tablet Take 1 tablet (1,000 mg) by mouth 3 times daily 21 tablet 1       ALLERGIES     Allergies   Allergen Reactions     Cats      No Known Allergies        PAST MEDICAL HISTORY:  Past Medical History:   Diagnosis Date     Cardiomyopathy, ischemic      Coronary artery disease      Family history of diabetes mellitus     Father     Hyperlipidaemia      Mitral valve disorders(424.0)     mitral regurgitation     Old myocardial infarction 5/04    anterior       PAST SURGICAL HISTORY:  Past Surgical History:   Procedure  Laterality Date     C EYE EXAM ESTABLISHED PT  2003     C REPAIR DETACH RETINA,SCLERAL BUCKLE  1967    Right eye     HC KNEE SCOPE, DIAGNOSTIC  8/1/2011    Right knee arthroscopy with partial medial meniscectomy     HEART CATH, ANGIOPLASTY  May 2004     intracoronary stent placement of mid LAD May 2004,     HEART CATH, ANGIOPLASTY  June 2004    elective staged stent of OM        FAMILY HISTORY:  Family History   Problem Relation Age of Onset     Diabetes Father      C.A.D. Father      Cancer - colorectal No family hx of      Prostate Cancer No family hx of        SOCIAL HISTORY:  Social History     Socioeconomic History     Marital status:      Spouse name: Yoni     Number of children: 2     Years of education: 18     Highest education level: None   Occupational History     Occupation: Teacher     Employer: [employer]     Comment: retired   Tobacco Use     Smoking status: Never Smoker     Smokeless tobacco: Never Used   Substance and Sexual Activity     Alcohol use: No     Alcohol/week: 0.0 standard drinks     Drug use: No     Sexual activity: Yes     Partners: Female   Other Topics Concern      Service Not Asked     Blood Transfusions Not Asked     Caffeine Concern Yes     Comment: 2-4 cups daily     Occupational Exposure Not Asked     Hobby Hazards Not Asked     Sleep Concern Not Asked     Stress Concern Not Asked     Weight Concern Not Asked     Special Diet Yes     Comment: low fat, low cholesterol     Back Care Not Asked     Exercise Yes     Comment: 90 mins daily     Bike Helmet Not Asked     Seat Belt Yes     Self-Exams Not Asked   Social History Narrative     None     Social Determinants of Health     Financial Resource Strain:      Difficulty of Paying Living Expenses:    Food Insecurity:      Worried About Running Out of Food in the Last Year:      Ran Out of Food in the Last Year:    Transportation Needs:      Lack of Transportation (Medical):      Lack of Transportation  "(Non-Medical):    Physical Activity:      Days of Exercise per Week:      Minutes of Exercise per Session:    Stress:      Feeling of Stress :    Social Connections:      Frequency of Communication with Friends and Family:      Frequency of Social Gatherings with Friends and Family:      Attends Latter-day Services:      Active Member of Clubs or Organizations:      Attends Club or Organization Meetings:      Marital Status:    Intimate Partner Violence:      Fear of Current or Ex-Partner:      Emotionally Abused:      Physically Abused:      Sexually Abused:        Review of Systems:  Skin:  Negative       Eyes:  Positive for glasses;glaucoma    ENT:  Negative      Respiratory:  Negative       Cardiovascular:  Negative      Gastroenterology: Negative      Genitourinary:  Negative      Musculoskeletal:  Negative      Neurologic:  Negative      Psychiatric:  Negative      Heme/Lymph/Imm:  Negative      Endocrine:  Negative        Physical Exam:  Vitals: /70 (BP Location: Right arm, Patient Position: Sitting, Cuff Size: Adult Regular)   Pulse (!) 48   Ht 1.753 m (5' 9\")   Wt 69.4 kg (153 lb)   SpO2 95%   BMI 22.59 kg/m      Constitutional:  cooperative, alert and oriented, well developed, well nourished, in no acute distress thin      Skin:  warm and dry to the touch, no apparent skin lesions or masses noted          Head:  normocephalic, no masses or lesions        Eyes:  pupils equal and round, conjunctivae and lids unremarkable, sclera white, no xanthalasma, EOMS intact, no nystagmus        Lymph:      ENT:  no pallor or cyanosis, dentition good        Neck:  carotid pulses are full and equal bilaterally;no carotid bruit        Respiratory:  normal breath sounds, clear to auscultation, normal A-P diameter, normal symmetry, normal respiratory excursion, no use of accessory muscles         Cardiac: regular rhythm;no murmurs, gallops or rubs detected;normal S1 and S2                pulses full and equal      "                                   GI:           Extremities and Muscular Skeletal:  no edema;no spinal abnormalities noted;normal muscle strength and tone              Neurological:  no gross motor deficits        Psych:  affect appropriate, oriented to time, person and place          CC  No referring provider defined for this encounter.                      Thank you for allowing me to participate in the care of your patient.      Sincerely,     David Mensah MD     [facility]  cc:   No referring provider defined for this encounter.

## 2021-09-29 NOTE — PROGRESS NOTES
HPI and Plan:   See dictation    Orders Placed This Encounter   Procedures     Basic metabolic panel     Lipid Profile     Follow-Up with Cardiac Advanced Practice Provider     Follow-Up with Cardiologist       No orders of the defined types were placed in this encounter.      There are no discontinued medications.      Encounter Diagnoses   Name Primary?     Coronary artery disease involving native coronary artery of native heart without angina pectoris Yes     Cardiomyopathy, ischemic      Pure hypercholesterolemia      Non-rheumatic mitral regurgitation        CURRENT MEDICATIONS:  Current Outpatient Medications   Medication Sig Dispense Refill     ASPIRIN 81 MG OR TABS 1 TABLET DAILY       atorvastatin (LIPITOR) 10 MG tablet Take 1 tablet (10 mg) by mouth daily 90 tablet 3     hydrocortisone (PROCTO-MED HC) 2.5 % cream apply to the anus twice daily as needed 30 g 0     latanoprost (XALATAN) 0.005 % ophthalmic solution Place 1 drop into the right eye At Bedtime   5     lisinopril (ZESTRIL) 5 MG tablet Take 1 tablet (5 mg) by mouth daily 90 tablet 3     Psyllium (METAMUCIL FIBER PO) Take by mouth daily        valACYclovir (VALTREX) 1000 mg tablet Take 1 tablet (1,000 mg) by mouth 3 times daily 21 tablet 1       ALLERGIES     Allergies   Allergen Reactions     Cats      No Known Allergies        PAST MEDICAL HISTORY:  Past Medical History:   Diagnosis Date     Cardiomyopathy, ischemic      Coronary artery disease      Family history of diabetes mellitus     Father     Hyperlipidaemia      Mitral valve disorders(424.0)     mitral regurgitation     Old myocardial infarction 5/04    anterior       PAST SURGICAL HISTORY:  Past Surgical History:   Procedure Laterality Date     C EYE EXAM ESTABLISHED PT  2003     C REPAIR DETACH RETINA,SCLERAL BUCKLE  1967    Right eye     HC KNEE SCOPE, DIAGNOSTIC  8/1/2011    Right knee arthroscopy with partial medial meniscectomy     HEART CATH, ANGIOPLASTY  May 2004     intracoronary  stent placement of mid LAD May 2004,     HEART CATH, ANGIOPLASTY  June 2004    elective staged stent of OM        FAMILY HISTORY:  Family History   Problem Relation Age of Onset     Diabetes Father      C.A.D. Father      Cancer - colorectal No family hx of      Prostate Cancer No family hx of        SOCIAL HISTORY:  Social History     Socioeconomic History     Marital status:      Spouse name: Yoni     Number of children: 2     Years of education: 18     Highest education level: None   Occupational History     Occupation: Teacher     Employer: [employer]     Comment: retired   Tobacco Use     Smoking status: Never Smoker     Smokeless tobacco: Never Used   Substance and Sexual Activity     Alcohol use: No     Alcohol/week: 0.0 standard drinks     Drug use: No     Sexual activity: Yes     Partners: Female   Other Topics Concern      Service Not Asked     Blood Transfusions Not Asked     Caffeine Concern Yes     Comment: 2-4 cups daily     Occupational Exposure Not Asked     Hobby Hazards Not Asked     Sleep Concern Not Asked     Stress Concern Not Asked     Weight Concern Not Asked     Special Diet Yes     Comment: low fat, low cholesterol     Back Care Not Asked     Exercise Yes     Comment: 90 mins daily     Bike Helmet Not Asked     Seat Belt Yes     Self-Exams Not Asked   Social History Narrative     None     Social Determinants of Health     Financial Resource Strain:      Difficulty of Paying Living Expenses:    Food Insecurity:      Worried About Running Out of Food in the Last Year:      Ran Out of Food in the Last Year:    Transportation Needs:      Lack of Transportation (Medical):      Lack of Transportation (Non-Medical):    Physical Activity:      Days of Exercise per Week:      Minutes of Exercise per Session:    Stress:      Feeling of Stress :    Social Connections:      Frequency of Communication with Friends and Family:      Frequency of Social Gatherings with  "Friends and Family:      Attends Gnosticism Services:      Active Member of Clubs or Organizations:      Attends Club or Organization Meetings:      Marital Status:    Intimate Partner Violence:      Fear of Current or Ex-Partner:      Emotionally Abused:      Physically Abused:      Sexually Abused:        Review of Systems:  Skin:  Negative       Eyes:  Positive for glasses;glaucoma    ENT:  Negative      Respiratory:  Negative       Cardiovascular:  Negative      Gastroenterology: Negative      Genitourinary:  Negative      Musculoskeletal:  Negative      Neurologic:  Negative      Psychiatric:  Negative      Heme/Lymph/Imm:  Negative      Endocrine:  Negative        Physical Exam:  Vitals: /70 (BP Location: Right arm, Patient Position: Sitting, Cuff Size: Adult Regular)   Pulse (!) 48   Ht 1.753 m (5' 9\")   Wt 69.4 kg (153 lb)   SpO2 95%   BMI 22.59 kg/m      Constitutional:  cooperative, alert and oriented, well developed, well nourished, in no acute distress thin      Skin:  warm and dry to the touch, no apparent skin lesions or masses noted          Head:  normocephalic, no masses or lesions        Eyes:  pupils equal and round, conjunctivae and lids unremarkable, sclera white, no xanthalasma, EOMS intact, no nystagmus        Lymph:      ENT:  no pallor or cyanosis, dentition good        Neck:  carotid pulses are full and equal bilaterally;no carotid bruit        Respiratory:  normal breath sounds, clear to auscultation, normal A-P diameter, normal symmetry, normal respiratory excursion, no use of accessory muscles         Cardiac: regular rhythm;no murmurs, gallops or rubs detected;normal S1 and S2                pulses full and equal                                        GI:           Extremities and Muscular Skeletal:  no edema;no spinal abnormalities noted;normal muscle strength and tone              Neurological:  no gross motor deficits        Psych:  affect appropriate, oriented to time, " person and place          CC  No referring provider defined for this encounter.

## 2021-10-23 ENCOUNTER — HEALTH MAINTENANCE LETTER (OUTPATIENT)
Age: 71
End: 2021-10-23

## 2021-11-02 DIAGNOSIS — I25.10 CORONARY ARTERY DISEASE INVOLVING NATIVE CORONARY ARTERY OF NATIVE HEART WITHOUT ANGINA PECTORIS: ICD-10-CM

## 2021-11-02 RX ORDER — ATORVASTATIN CALCIUM 10 MG/1
10 TABLET, FILM COATED ORAL DAILY
Qty: 90 TABLET | Refills: 3 | Status: SHIPPED | OUTPATIENT
Start: 2021-11-02 | End: 2022-10-28

## 2021-11-02 RX ORDER — LISINOPRIL 5 MG/1
5 TABLET ORAL DAILY
Qty: 90 TABLET | Refills: 3 | Status: SHIPPED | OUTPATIENT
Start: 2021-11-02 | End: 2022-09-16

## 2021-12-17 ENCOUNTER — OFFICE VISIT (OUTPATIENT)
Dept: FAMILY MEDICINE | Facility: CLINIC | Age: 71
End: 2021-12-17

## 2021-12-17 VITALS
RESPIRATION RATE: 20 BRPM | HEART RATE: 49 BPM | SYSTOLIC BLOOD PRESSURE: 104 MMHG | OXYGEN SATURATION: 97 % | TEMPERATURE: 98.1 F | WEIGHT: 161 LBS | BODY MASS INDEX: 23.78 KG/M2 | DIASTOLIC BLOOD PRESSURE: 68 MMHG

## 2021-12-17 DIAGNOSIS — R07.81 RIB PAIN ON RIGHT SIDE: Primary | ICD-10-CM

## 2021-12-17 PROCEDURE — 71101 X-RAY EXAM UNILAT RIBS/CHEST: CPT | Performed by: STUDENT IN AN ORGANIZED HEALTH CARE EDUCATION/TRAINING PROGRAM

## 2021-12-17 PROCEDURE — 99214 OFFICE O/P EST MOD 30 MIN: CPT | Performed by: STUDENT IN AN ORGANIZED HEALTH CARE EDUCATION/TRAINING PROGRAM

## 2021-12-17 NOTE — PATIENT INSTRUCTIONS
Your pain is right at the rib - cartilage connection and I expect to completely heal within a few weeks     Ice, tylenol, ibuprofen if needed for pain    Continue to take deep breaths and stay active    Follow-up with any worsening pain, new breathing concerns or cough

## 2021-12-17 NOTE — PROGRESS NOTES
"ASSESSMENT & PLAN    1. Rib pain on right side  XRs obtained and reviewed with patient, no fracture or ptx appreciated. Clinically pain seems to be at rib-cartilage junction at 9 and 10, and already significantly improved. Symptomatic tx as below, precautions and reasons to follow-up discussed.   - XR Ribs & Chest Rt 3vw      Patient Instructions   Your pain is right at the rib - cartilage connection and I expect to completely heal within a few weeks     Ice, tylenol, ibuprofen if needed for pain    Continue to take deep breaths and stay active    Follow-up with any worsening pain, new breathing concerns or cough    30 minutes spent on the date of the encounter doing chart review, history and exam, documentation and further activities per the note.    Kevin Strange MD, [facility]        -----  Chief Complaint   Patient presents with     Rib Pain     pain in the ribs area, was reaching back towards the wall against the toilet and thought he heard a noise but did not feel any immediate discomfort, this happened on Tuesday but when he worked out and moved his arms and lift weights he felt some discomfort, since scheduling things have resolved       SUBJECTIVE  Sly Morton is a/an 71 year old male who is seen for evaluation of left rib pain     The patient is seen by themselves.    Date of Onset: 3 days ago. Was leaning forward and to the right when he felt a loud \"crack\". No immediate pain however. Pain noted later but now minimal.    Location of Pain: right lower lateral chest wall  Worsened by: overhead lifting   Better with: significantly improving since it happened  Treatments tried: rest/activity avoidance  Associated symptoms: denies any cough or SOB.    OBJECTIVE:  /68 (BP Location: Left arm, Patient Position: Sitting, Cuff Size: Adult Large)   Pulse (!) 49   Temp 98.1  F (36.7  C) (Temporal)   Resp 20   Wt 73 kg (161 lb)   SpO2 97%   BMI 23.78 kg/m     General: " healthy, alert and in no distress  HEENT: no scleral icterus or conjunctival erythema  Skin: no visible suspicious lesions or rashes.   CV: RRR, no pedal edema   Resp: normal respiratory effort without conversational dyspnea, CTAB with symm air movement  Psych: normal mood and affect  Gait: normal, with appropriate coordination and balance     MSK:   Point tenderness over anteriolateral 9th and 10th ribs, no deformity, crepitus or subcutaneous emphysema    RADIOLOGY:  XR Ribs & Chest Rt 3vw    Result Date: 2021  [facility] Phone: [phone] * Fax: [phone] [address] [facility], [place] [address] [address] [place] [address] Age: 71 Y Dept No.: [id number] JUAN M LENA PANCHITO : 1950 Chart # Gender: M Req. Phys: Kevin Strange MD Clinic MRN: Clinic: [facility] Acc#: [id number] Exam: RIBS WITH PA AND LATERAL CHEST / RT Exam Date: 2021 EXAM: RIBS WITH PA AND LATERAL CHEST RIGHT LOCATION: [facility] DATE/TIME: 2021 1:48 PM INDICATION: Right sided rib pain. COMPARISON: None. IMPRESSION: The visualized heart and lungs are negative. No rib fractures. Performed by: CAROL Transcribed By: MARCIANO on: 2021 2:25 PM CST Finalized By: SUMAN BECERRA M.D. on: 2021 2:25 PM CST Dictated By: SUMAN BECERRA M.D. Signed by: OLGA Page 1 of 1

## 2021-12-17 NOTE — NURSING NOTE
Chief Complaint   Patient presents with     Rib Pain     pain in the ribs area, was reaching back towards the wall against the toilet and thought he heard a noise but did not feel any immediate discomfort, this happened on Tuesday but when he worked out and moved his arms and lift weights he felt some discomfort, since scheduling things have resolved     Pre-visit Screening:  Immunizations:  up to date  Colonoscopy:  is up to date  Mammogram: NA  Asthma Action Test/Plan:  NA  PHQ9:  NA  GAD7:  NA  Questioned patient about current smoking habits Pt. has never smoked.  Ok to leave detailed message on voice mail for today's visit only Yes, phone # [phone]

## 2021-12-29 ENCOUNTER — OFFICE VISIT (OUTPATIENT)
Dept: FAMILY MEDICINE | Facility: CLINIC | Age: 71
End: 2021-12-29

## 2021-12-29 VITALS
OXYGEN SATURATION: 96 % | DIASTOLIC BLOOD PRESSURE: 68 MMHG | HEIGHT: 69 IN | WEIGHT: 161 LBS | HEART RATE: 48 BPM | TEMPERATURE: 98.4 F | BODY MASS INDEX: 23.85 KG/M2 | SYSTOLIC BLOOD PRESSURE: 106 MMHG

## 2021-12-29 DIAGNOSIS — R07.81 RIB PAIN: Primary | ICD-10-CM

## 2021-12-29 PROCEDURE — 99213 OFFICE O/P EST LOW 20 MIN: CPT | Performed by: FAMILY MEDICINE

## 2021-12-29 ASSESSMENT — MIFFLIN-ST. JEOR: SCORE: 1485.67

## 2021-12-29 NOTE — PROGRESS NOTES
Assessment & Plan   Problem List Items Addressed This Visit     None      Visit Diagnoses     Rib pain    -  Primary           1. Rib pain  I suspect this is a fracture by history and exam. We discussed symptomatic treatment and he said he was comfortable with this.            FUTURE APPOINTMENTS:       - Follow-up visit as needed.    No follow-ups on file.    Doris Charles MD  [facility]    Subjective     Nursing Notes:   Shira Barbosa, [facility]  12/29/2021 12:22 PM  Signed  Chief Complaint   Patient presents with     Rib Pain     recheck rib pain, pain is getting worse on right side, pain while brushing his teeth or sleeping,  has layed low on working out     Pre-visit Screening:  Immunizations:  up to date  Colonoscopy:  is up to date  Mammogram: NA  Asthma Action Test/Plan:  NA  PHQ9:  NA  GAD7:  NA  Questioned patient about current smoking habits Pt. has never smoked.  Ok to leave detailed message on voice mail for today's visit only Yes, phone # [phone]           Sly Morton is a 71 year old male who presents to clinic today for the following health issues   HPI     Right sided rib pain for 2 weeks. Saw Dr. Strange for this initially.  Was trying to paint a wall and body rolled off the toilet that he was resting on and fell between the toilet and wall. Felt a snap. Can't exactly remember what the advice was. Has still tried to continue to be pysically active. Seems like there is more discomfort now. Not really better and is actually worse. Even simple activities like brushing his teeth, movements of arms side to side. Is trying to protect this side. Is not super tender but is there anything else that can be done? Shoveling was not pleasant. Usually is ok to be doing this. This one spot hurts to push on, right lateral 9-10 ribs.         Review of Systems   Constitutional, HEENT, cardiovascular, pulmonary, gi and gu systems are negative, except as otherwise noted.      Objective    BP  "106/68 (BP Location: Right arm, Patient Position: Sitting, Cuff Size: Adult Regular)   Pulse (!) 48   Temp 98.4  F (36.9  C) (Temporal)   Ht 1.753 m (5' 9\")   Wt 73 kg (161 lb)   SpO2 96%   BMI 23.78 kg/m    Body mass index is 23.78 kg/m .  Physical Exam   GENERAL: healthy, alert and no distress  RESP: lungs clear to auscultation - no rales, rhonchi or wheezes  MS: no gross musculoskeletal defects noted, no edema  NEURO: Normal strength and tone, mentation intact and speech normal  PSYCH: mentation appears normal, affect normal/bright  Right lateral rib area, point tenderness to palpation no bruising, no obvious deformity or swelling          "

## 2022-02-12 ENCOUNTER — HEALTH MAINTENANCE LETTER (OUTPATIENT)
Age: 72
End: 2022-02-12

## 2022-06-01 ENCOUNTER — OFFICE VISIT (OUTPATIENT)
Dept: FAMILY MEDICINE | Facility: CLINIC | Age: 72
End: 2022-06-01

## 2022-06-01 VITALS
DIASTOLIC BLOOD PRESSURE: 68 MMHG | BODY MASS INDEX: 23.79 KG/M2 | SYSTOLIC BLOOD PRESSURE: 110 MMHG | TEMPERATURE: 97.2 F | HEART RATE: 48 BPM | HEIGHT: 69 IN | RESPIRATION RATE: 20 BRPM | WEIGHT: 160.6 LBS

## 2022-06-01 DIAGNOSIS — R20.2 PARESTHESIAS: ICD-10-CM

## 2022-06-01 DIAGNOSIS — H61.22 IMPACTED CERUMEN OF LEFT EAR: Primary | ICD-10-CM

## 2022-06-01 DIAGNOSIS — Z71.89 ACP (ADVANCE CARE PLANNING): ICD-10-CM

## 2022-06-01 DIAGNOSIS — Z12.5 SPECIAL SCREENING FOR MALIGNANT NEOPLASM OF PROSTATE: ICD-10-CM

## 2022-06-01 PROCEDURE — 99213 OFFICE O/P EST LOW 20 MIN: CPT | Mod: 25 | Performed by: FAMILY MEDICINE

## 2022-06-01 PROCEDURE — 69209 REMOVE IMPACTED EAR WAX UNI: CPT | Mod: LT | Performed by: FAMILY MEDICINE

## 2022-06-01 NOTE — NURSING NOTE
Sly Morton is here for left ear plugging. Also possible referal to podiatry    Questioned patient about current smoking habits.  Pt. has never smoked.  PULSE regular  My Chart: active  CLASSIFICATION OF OVERWEIGHT AND OBESITY BY BMI                        Obesity Class           BMI(kg/m2)  Underweight                                    < 18.5  Normal                                         18.5-24.9  Overweight                                     25.0-29.9  OBESITY                     I                  30.0-34.9                             II                 35.0-39.9  EXTREME OBESITY             III                >40                            Patient's  BMI Body mass index is 23.72 kg/m .  http://hin.nhlbi.nih.gov/menuplanner/menu.cgi  Pre-visit planning  Immunizations - up to date  Colonoscopy -   Mammogram -   Asthma -   PHQ9 -    ANNE-7 -

## 2022-06-01 NOTE — PROGRESS NOTES
(S) Sly Morton is a 71 year old male who complains of left ear hearng loss  for 5 days or more. No fever or URI symptoms. Has not been using q-tips. NO ear pain    Pt also notes tingling sensation in feet-balls and toes, mainly bothers him in the evening and at bedtime- pt does note he was in cold weather several years ago biking and feet were very cold and numb-no known hypothermia, no discoloration but feels tingling on and off ever since-pt would like this investigated.  No hx diabetes Non smoker    Pt does get exercise regularly-no pain with exercise    Patient Active Problem List   Diagnosis     Sciatica     Family history of diabetes mellitus     CATARACT NEC Right      Health Care Home     ACP (advance care planning)     Coronary artery disease involving native coronary artery of native heart without angina pectoris     Cardiomyopathy, ischemic     Pure hypercholesterolemia     Non-rheumatic mitral regurgitation     ST elevation myocardial infarction involving left anterior descending (LAD) coronary artery (H)     Past Medical History:   Diagnosis Date     Cardiomyopathy, ischemic      Coronary artery disease      Family history of diabetes mellitus     Father     Hyperlipidaemia      Mitral valve disorders(424.0)     mitral regurgitation     Old myocardial infarction 5/04    anterior     Family History   Problem Relation Age of Onset     Diabetes Father      C.A.D. Father      Cancer - colorectal No family hx of      Prostate Cancer No family hx of      Social History     Socioeconomic History     Marital status:      Spouse name: Yoni     Number of children: 2     Years of education: 18     Highest education level: Not on file   Occupational History     Occupation: Teacher     Employer: [employer]     Comment: retired   Tobacco Use     Smoking status: Never Smoker     Smokeless tobacco: Never Used   Substance and Sexual Activity     Alcohol use: No     Alcohol/week: 0.0  standard drinks     Drug use: No     Sexual activity: Yes     Partners: Female   Other Topics Concern      Service Not Asked     Blood Transfusions Not Asked     Caffeine Concern Yes     Comment: 2-4 cups daily     Occupational Exposure Not Asked     Hobby Hazards Not Asked     Sleep Concern Not Asked     Stress Concern Not Asked     Weight Concern Not Asked     Special Diet Yes     Comment: low fat, low cholesterol     Back Care Not Asked     Exercise Yes     Comment: 90 mins daily     Bike Helmet Not Asked     Seat Belt Yes     Self-Exams Not Asked   Social History Narrative     Not on file     Social Determinants of Health     Financial Resource Strain: Not on file   Food Insecurity: Not on file   Transportation Needs: Not on file   Physical Activity: Not on file   Stress: Not on file   Social Connections: Not on file   Intimate Partner Violence: Not on file   Housing Stability: Not on file     Past Surgical History:   Procedure Laterality Date     HC KNEE SCOPE, DIAGNOSTIC  8/1/2011    Right knee arthroscopy with partial medial meniscectomy     HEART CATH, ANGIOPLASTY  May 2004     intracoronary stent placement of mid LAD May 2004,     HEART CATH, ANGIOPLASTY  June 2004    elective staged stent of OM      [facility] EYE EXAM ESTABLISHED PT  2003     [facility] REPAIR DETACH RETINA,SCLERAL BUCKLE  1967    Right eye     ASPIRIN 81 MG OR TABS, 1 TABLET DAILY  atorvastatin (LIPITOR) 10 MG tablet, Take 1 tablet (10 mg) by mouth daily  hydrocortisone (PROCTO-MED HC) 2.5 % cream, apply to the anus twice daily as needed  latanoprost (XALATAN) 0.005 % ophthalmic solution, Place 1 drop into the right eye At Bedtime   lisinopril (ZESTRIL) 5 MG tablet, Take 1 tablet (5 mg) by mouth daily  Psyllium (METAMUCIL FIBER PO), Take by mouth daily   valACYclovir (VALTREX) 1000 mg tablet, Take 1 tablet (1,000 mg) by mouth 3 times daily    No current facility-administered medications on file prior to visit.       Allergies: Cats and No  known allergies    Immunization History   Administered Date(s) Administered     COVID-19,PF,Pfizer (12+ Yrs) 03/11/2021, 04/01/2021, 10/01/2021     COVID-19,PF,Pfizer 12+ Yrs (2022 and After) 04/10/2022     Influenza Quad, Recombinant, pf(RIV4) (Flublok) 10/15/2019, 10/06/2020     Influenza Vaccine IM > 6 months Valent IIV4 (Alfuria,Fluzone) 10/02/2014     Influenza Vaccine, 6+MO IM (QUADRIVALENT W/PRESERVATIVES) 09/24/2015, 09/28/2017     Influenza, Quad, High Dose, Pf, 65yr+ (Fluzone HD) 10/01/2021     Pneumo Conj 13-V (2010&after) 07/26/2018     TD (ADULT, 7+) 08/03/1999, 03/26/2007     TDAP Vaccine (Boostrix) 08/13/2012     Zoster vaccine recombinant adjuvanted (SHINGRIX) 08/29/2018, 11/26/2018            (O) he appears well, afebrile.  left  ear(s) reveals normal TM noted after wax removal    Skin-no lesions on feet    (A)  left cerumen impaction  Paresthesias-suspect peripheral neuropaty    (P) Removal attempted by yajaira, but lavage was necessary to remove all cerumen.  TM was clear on inspection after lavage.  Patient was advised to avoid use of q-tips.  Pt. can use OTC drops or irrigation kits as needed. Follow up if no improvement.

## 2022-06-03 LAB — ABBOTT PSA - QUEST: 0.57 NG/ML

## 2022-06-06 ENCOUNTER — TELEPHONE (OUTPATIENT)
Dept: FAMILY MEDICINE | Facility: CLINIC | Age: 72
End: 2022-06-06

## 2022-06-06 DIAGNOSIS — R20.2 PARESTHESIAS: Primary | ICD-10-CM

## 2022-06-06 NOTE — TELEPHONE ENCOUNTER
Pt called stating he has not heard from Neurology. He said at his visit on 06/01/22 with [facility] they discussed some tingling in feet and was going to get a referral to Neurology to discuss neuropathy. I do not see this referral.     Please advise # [phone]    Thanks, Shira CAMPOS

## 2022-06-06 NOTE — TELEPHONE ENCOUNTER
I left a message for patient that the Neurology Referral with note was faxed to     [facility]  [address] [place] [address]  [phone] -- appt line  [phone] -- fax    They will be calling him within 3-5 business days from receiving the referral to schedule his appt.

## 2022-06-06 NOTE — TELEPHONE ENCOUNTER
Dr. Shetty please review message below regarding Neurology Referral.     Please complete/sign the pending Neurology Referral if you are referring for Neurology.     Thank you

## 2022-07-08 DIAGNOSIS — G25.81 RESTLESS LEGS SYNDROME (RLS): Primary | ICD-10-CM

## 2022-07-08 DIAGNOSIS — R20.0 TACTILE ANESTHESIA: ICD-10-CM

## 2022-07-08 DIAGNOSIS — R20.2 PARESTHESIA: ICD-10-CM

## 2022-07-08 LAB — HBA1C MFR BLD: 5.3 % (ref 4–7)

## 2022-07-08 PROCEDURE — 83036 HEMOGLOBIN GLYCOSYLATED A1C: CPT | Performed by: FAMILY MEDICINE

## 2022-07-08 NOTE — NURSING NOTE
Non fasting lab only   Orders from [facility] clinic of Neuro  Dr. Scruggs    Fax [phone]

## 2022-07-09 LAB
% SATURATION - QUEST: 33 % (CALC) (ref 20–48)
FOLATE: 13.4 NG/ML
IRON: 111 MCG/DL (ref 50–180)
T4, FREE, NON-DIALYSIS - QUEST: 1.4 NG/DL (ref 0.8–1.8)
TIBC - QUEST: 332 MCG/DL (CALC) (ref 250–425)
TSH SERPL-ACNC: 1.13 MIU/L (ref 0.4–4.5)
VIT B12 SERPL-MCNC: 343 PG/ML (ref 200–1100)

## 2022-09-16 ENCOUNTER — LAB (OUTPATIENT)
Dept: LAB | Facility: CLINIC | Age: 72
End: 2022-09-16
Payer: COMMERCIAL

## 2022-09-16 ENCOUNTER — HOSPITAL ENCOUNTER (OUTPATIENT)
Dept: CARDIOLOGY | Facility: CLINIC | Age: 72
Discharge: HOME | End: 2022-09-16
Attending: PHYSICIAN ASSISTANT | Admitting: PHYSICIAN ASSISTANT
Payer: COMMERCIAL

## 2022-09-16 ENCOUNTER — OFFICE VISIT (OUTPATIENT)
Dept: CARDIOLOGY | Facility: CLINIC | Age: 72
End: 2022-09-16
Payer: COMMERCIAL

## 2022-09-16 VITALS
DIASTOLIC BLOOD PRESSURE: 52 MMHG | HEART RATE: 68 BPM | HEIGHT: 69 IN | BODY MASS INDEX: 23.27 KG/M2 | SYSTOLIC BLOOD PRESSURE: 92 MMHG | WEIGHT: 157.1 LBS

## 2022-09-16 DIAGNOSIS — I48.91 ATRIAL FIBRILLATION, UNSPECIFIED TYPE (H): ICD-10-CM

## 2022-09-16 DIAGNOSIS — I25.10 CORONARY ARTERY DISEASE INVOLVING NATIVE CORONARY ARTERY OF NATIVE HEART WITHOUT ANGINA PECTORIS: Chronic | ICD-10-CM

## 2022-09-16 DIAGNOSIS — I25.10 CORONARY ARTERY DISEASE INVOLVING NATIVE CORONARY ARTERY OF NATIVE HEART WITHOUT ANGINA PECTORIS: ICD-10-CM

## 2022-09-16 DIAGNOSIS — I25.5 CARDIOMYOPATHY, ISCHEMIC: Primary | ICD-10-CM

## 2022-09-16 LAB
ANION GAP SERPL CALCULATED.3IONS-SCNC: 10 MMOL/L (ref 7–15)
BUN SERPL-MCNC: 20.5 MG/DL (ref 8–23)
CALCIUM SERPL-MCNC: 8.9 MG/DL (ref 8.8–10.2)
CHLORIDE SERPL-SCNC: 105 MMOL/L (ref 98–107)
CHOLEST SERPL-MCNC: 135 MG/DL
CREAT SERPL-MCNC: 0.95 MG/DL (ref 0.67–1.17)
DEPRECATED HCO3 PLAS-SCNC: 26 MMOL/L (ref 22–29)
GFR SERPL CREATININE-BSD FRML MDRD: 86 ML/MIN/1.73M2
GLUCOSE SERPL-MCNC: 94 MG/DL (ref 70–99)
HDLC SERPL-MCNC: 55 MG/DL
LDLC SERPL CALC-MCNC: 67 MG/DL
MAGNESIUM SERPL-MCNC: 2 MG/DL (ref 1.7–2.3)
NONHDLC SERPL-MCNC: 80 MG/DL
POTASSIUM SERPL-SCNC: 4.3 MMOL/L (ref 3.4–5.3)
SODIUM SERPL-SCNC: 141 MMOL/L (ref 136–145)
TRIGL SERPL-MCNC: 64 MG/DL
TSH SERPL DL<=0.005 MIU/L-ACNC: 1.23 UIU/ML (ref 0.3–4.2)

## 2022-09-16 PROCEDURE — 80061 LIPID PANEL: CPT | Performed by: INTERNAL MEDICINE

## 2022-09-16 PROCEDURE — 99215 OFFICE O/P EST HI 40 MIN: CPT | Performed by: PHYSICIAN ASSISTANT

## 2022-09-16 PROCEDURE — 83735 ASSAY OF MAGNESIUM: CPT | Performed by: PHYSICIAN ASSISTANT

## 2022-09-16 PROCEDURE — 36415 COLL VENOUS BLD VENIPUNCTURE: CPT | Performed by: INTERNAL MEDICINE

## 2022-09-16 PROCEDURE — 93225 XTRNL ECG REC<48 HRS REC: CPT

## 2022-09-16 PROCEDURE — 93227 XTRNL ECG REC<48 HR R&I: CPT | Performed by: INTERNAL MEDICINE

## 2022-09-16 PROCEDURE — 93000 ELECTROCARDIOGRAM COMPLETE: CPT | Performed by: PHYSICIAN ASSISTANT

## 2022-09-16 PROCEDURE — 80048 BASIC METABOLIC PNL TOTAL CA: CPT | Performed by: INTERNAL MEDICINE

## 2022-09-16 PROCEDURE — 84443 ASSAY THYROID STIM HORMONE: CPT | Performed by: PHYSICIAN ASSISTANT

## 2022-09-16 RX ORDER — LISINOPRIL 2.5 MG/1
2.5 TABLET ORAL DAILY
Qty: 90 TABLET | Refills: 3 | Status: SHIPPED | OUTPATIENT
Start: 2022-09-16 | End: 2023-11-02

## 2022-09-16 RX ORDER — GABAPENTIN 300 MG/1
300 CAPSULE ORAL AT BEDTIME
COMMUNITY
Start: 2022-09-01

## 2022-09-16 NOTE — LETTER
2022    Eriberto Shetty MD  [address], [id number]  [facility] [address]    RE: Sly Morton       Dear Colleague,     I had the pleasure of seeing Sly Morton in the [facility].      CARDIOLOGY CLINIC PROGRESS NOTE    DOS: 2022      Sly Morton  : 1950, 72 year old  MRN: [id number]      Primary Cardiologist:  Dr. Mensah     HPI:  Sly Morton is a 72 year old male with past medical history including coronary artery disease dating back to  when he presented with an anterior wall myocardial infarction.  He underwent stenting of his mid LAD.  In a staged fashion, he underwent stenting of the OM.  Ejection fraction was 35-40% with 2-3+ mitral regurgitation.    Stress nuclear scan in 2012 demonstrated a small fixed anteroapical defect consistent with a nontransmural infarct, and there was inferior diaphragmatic attenuation.  Ejection fraction was estimated to be 47%.   Echocardiogram 18: LVEF 35-40% with trace to mild mitral regurgitation, unchanged from 2016.   Echo 19: LVEF 40-45%, mild MR.      Stress echocardiogram 21: he was able to exercise 10 minutes without symptoms or EKG changes.  Obviously, he had echocardiographic evidence of his old infarct but no evidence of any ischemia.  Baseline ejection fraction was felt to be 35%-40%, but with exercise it was thought to be 45%-50%.  There is mitral regurgitation that was described as mild.       Sly presents today for his annual follow/up.    He tells me he continues to do very well from a cardiac standpoint without any chest pain, shortness of breath, palpitations, edema, syncope.      His BP runs in the upper 80s at home, here 92/52 today.   As noted above, no syncope.  But he does have some LH when he stands up.   He works out daily.  He does stretching for 30 minutes followed elliptical daily for about 20 minutes. Tries to do jogging. He also does pushups, kettle ball, core  strength. He does an hour a day.  No sxs with working out.   He works hard on a healthy diet, though does allow some treats and snacks at some times.        ROS:  Skin:  not assessed     Eyes:  not assessed    ENT:  not assessed    Respiratory:  Negative    Cardiovascular:    Positive for;lightheadedness  Gastroenterology: not assessed    Genitourinary:  not assessed    Musculoskeletal:  not assessed    Neurologic:  not assessed    Psychiatric:  not assessed    Heme/Lymph/Imm:  not assessed    Endocrine:  not assessed      PAST MEDICAL HISTORY:  Past Medical History:   Diagnosis Date     Cardiomyopathy, ischemic      Coronary artery disease      Family history of diabetes mellitus     Father     Hyperlipidaemia      Mitral valve disorders(424.0)     mitral regurgitation     Old myocardial infarction 5/04    anterior       PAST SURGICAL HISTORY:  Past Surgical History:   Procedure Laterality Date     HC KNEE SCOPE, DIAGNOSTIC  8/1/2011    Right knee arthroscopy with partial medial meniscectomy     HEART CATH, ANGIOPLASTY  May 2004     intracoronary stent placement of mid LAD May 2004,     HEART CATH, ANGIOPLASTY  June 2004    elective staged stent of OM      [facility] EYE EXAM ESTABLISHED PT  2003     [facility] REPAIR DETACH RETINA,SCLERAL BUCKLE  1967    Right eye       SOCIAL HISTORY:  Social History     Socioeconomic History     Marital status:      Spouse name: Yoni     Number of children: 2     Years of education: 18   Occupational History     Occupation: Teacher     Employer: [employer]     Comment: retired   Tobacco Use     Smoking status: Never Smoker     Smokeless tobacco: Never Used   Substance and Sexual Activity     Alcohol use: No     Alcohol/week: 0.0 standard drinks     Drug use: No     Sexual activity: Yes     Partners: Female   Other Topics Concern     Caffeine Concern Yes     Comment: 2-4 cups daily     Special Diet Yes     Comment: low fat, low cholesterol     Exercise Yes     Comment:  "90 mins daily     Seat Belt Yes       FAMILY HISTORY:  Family History   Problem Relation Age of Onset     Diabetes Father      C.A.D. Father      Cancer - colorectal No family hx of      Prostate Cancer No family hx of        MEDS: ASPIRIN 81 MG OR TABS, 1 TABLET DAILY  atorvastatin (LIPITOR) 10 MG tablet, Take 1 tablet (10 mg) by mouth daily  gabapentin (NEURONTIN) 300 MG capsule, Take 300 mg by mouth At Bedtime  hydrocortisone (PROCTO-MED HC) 2.5 % cream, apply to the anus twice daily as needed  latanoprost (XALATAN) 0.005 % ophthalmic solution, Place 1 drop into the right eye At Bedtime   Psyllium (METAMUCIL FIBER PO), Take by mouth daily   valACYclovir (VALTREX) 1000 mg tablet, Take 1 tablet (1,000 mg) by mouth 3 times daily (Patient not taking: Reported on 9/16/2022)    No current facility-administered medications on file prior to visit.      ALLERGIES:   Allergies   Allergen Reactions     Cats      No Known Allergies        PHYSICAL EXAM:  Vitals: BP 92/52   Pulse 68   Ht 1.753 m (5' 9\")   Wt 71.3 kg (157 lb 1.6 oz)   BMI 23.20 kg/m    Constitutional:  cooperative, alert and oriented, well developed, well nourished, in no acute distress thin      Skin:  warm and dry to the touch, no apparent skin lesions or masses noted        Head:  normocephalic, no masses or lesions        Eyes:  pupils equal and round;conjunctivae and lids unremarkable;sclera white        ENT:  no pallor or cyanosis        Neck:  no carotid bruit;JVP normal        Respiratory:  normal breath sounds, clear to auscultation, normal A-P diameter, normal symmetry, normal respiratory excursion, no use of accessory muscles        Cardiac: no murmurs, gallops or rubs detected;normal S1 and S2 irregular rhythm                GI:  abdomen soft;BS normoactive        Vascular: pulses full and equal                                      Extremities and Musculoskeletal:  no edema;no spinal abnormalities noted;normal muscle strength and tone    "     Neurological:  no gross motor deficits              LABS/DATA:  I reviewed the following:  FLP pending today      Component      Latest Ref Rng & Units 9/16/2022   Sodium      136 - 145 mmol/L 141   Potassium      3.4 - 5.3 mmol/L 4.3   Chloride      98 - 107 mmol/L 105   Carbon Dioxide (CO2)      22 - 29 mmol/L 26   Anion Gap      7 - 15 mmol/L 10   Urea Nitrogen      8.0 - 23.0 mg/dL 20.5   Creatinine      0.67 - 1.17 mg/dL 0.95   Calcium      8.8 - 10.2 mg/dL 8.9   Glucose      70 - 99 mg/dL 94   GFR Estimate      >60 mL/min/1.73m2 86         EKG 9/16/2022:  Afib,       ASSESSMENT/PLAN:  Sly is a very pleasant, 72-year-old gentleman with a history of coronary artery disease and cardiomyopathy.  He also has a history of mitral regurgitation.      Stress echo 2021 showed old infarct, but no ischemia.  LVEF  35%-40%, but with exercise it was thought to be 45%-50%.  There is mitral regurgitation that was described as mild.    Sly continues to do well.  He fells well without chest discomfort or CHF sxs.      Though he has no sense of palpitations, his rhythm was irregular on exam today.  EKG was done, reviewed with Dr. Mckeon in the clinics, and shows new afib, .  We are uncertain of the duration as he is asx from this.   I will place a 48 Hour Holter to check rate and rhythm.   He will be seen back next week to review and consider medication options vs RICARDO DCCV.   His BP is running low in the 80s at home and I am lowering lisinopril as noted below. I will not add metoprolol today. Instead, will wait to see what his rate control is.   [id number] score is 3 (CHF, Age Vasc). He will continue his ASA 81 mg for now, but we did discuss the need for anticoagulation. He would first like to see what the monitor shows.     BMP 9/16/2022 is WNL, specifically K is WNL.  I will add on  TSH and magnesium given the new afib.    BP is running quite low at home with SBP in the upper 80s and today in clinic  92/52 today on lisinopril 5 mg.  Ideally we would want him on ACEi for his CMY, and since he is asx, we will just lower to 2.5 mg daily.   We may need to stop the ACEi in favor of rate control going forward.        FLP 9/22/21: , HDL 64, LDL 57, TG 32.  He is on atorvastatin 10 mg.  FLP is pending today.  He would like a copy of the lab results once we get them.               Follow up:  Holter  Follow up next week   Dr. Mensah in 1 year with labs (BMP, FLP)          >40 minutes spent on the date of the encounter doing chart review, review of test results, interpretation of tests, patient visit, documentation and discussion with other provider(s)      ADDENDUM  Component      Latest Ref Rng & Units 9/16/2022   Magnesium      1.7 - 2.3 mg/dL 2.0   TSH      0.30 - 4.20 uIU/mL 1.23     Both WNL  Delvin Dougherty PA-C 9/16/2022 5:17 PM    Delvin Dougherty PA-C      Thank you for allowing me to participate in the care of your patient.      Sincerely,     Delvin Dougherty PA-C     [facility]  cc:   Referred Self,

## 2022-09-16 NOTE — PROGRESS NOTES
CARDIOLOGY CLINIC PROGRESS NOTE    DOS: 2022      Sly Morton  : 1950, 72 year old  MRN: [id number]      Primary Cardiologist:  Dr. Mensah     HPI:  Sly Morton is a 72 year old male with past medical history including coronary artery disease dating back to  when he presented with an anterior wall myocardial infarction.  He underwent stenting of his mid LAD.  In a staged fashion, he underwent stenting of the OM.  Ejection fraction was 35-40% with 2-3+ mitral regurgitation.    Stress nuclear scan in 2012 demonstrated a small fixed anteroapical defect consistent with a nontransmural infarct, and there was inferior diaphragmatic attenuation.  Ejection fraction was estimated to be 47%.   Echocardiogram 18: LVEF 35-40% with trace to mild mitral regurgitation, unchanged from 2016.   Echo 19: LVEF 40-45%, mild MR.      Stress echocardiogram 21: he was able to exercise 10 minutes without symptoms or EKG changes.  Obviously, he had echocardiographic evidence of his old infarct but no evidence of any ischemia.  Baseline ejection fraction was felt to be 35%-40%, but with exercise it was thought to be 45%-50%.  There is mitral regurgitation that was described as mild.       Sly presents today for his annual follow/up.    He tells me he continues to do very well from a cardiac standpoint without any chest pain, shortness of breath, palpitations, edema, syncope.      His BP runs in the upper 80s at home, here 92/52 today.   As noted above, no syncope.  But he does have some LH when he stands up.   He works out daily.  He does stretching for 30 minutes followed elliptical daily for about 20 minutes. Tries to do jogging. He also does pushups, kettle ball, core strength. He does an hour a day.  No sxs with working out.   He works hard on a healthy diet, though does allow some treats and snacks at some times.        ROS:  Skin:  not assessed     Eyes:  not assessed    ENT:  not  assessed    Respiratory:  Negative    Cardiovascular:    Positive for;lightheadedness  Gastroenterology: not assessed    Genitourinary:  not assessed    Musculoskeletal:  not assessed    Neurologic:  not assessed    Psychiatric:  not assessed    Heme/Lymph/Imm:  not assessed    Endocrine:  not assessed      PAST MEDICAL HISTORY:  Past Medical History:   Diagnosis Date     Cardiomyopathy, ischemic      Coronary artery disease      Family history of diabetes mellitus     Father     Hyperlipidaemia      Mitral valve disorders(424.0)     mitral regurgitation     Old myocardial infarction 5/04    anterior       PAST SURGICAL HISTORY:  Past Surgical History:   Procedure Laterality Date     HC KNEE SCOPE, DIAGNOSTIC  8/1/2011    Right knee arthroscopy with partial medial meniscectomy     HEART CATH, ANGIOPLASTY  May 2004     intracoronary stent placement of mid LAD May 2004,     HEART CATH, ANGIOPLASTY  June 2004    elective staged stent of OM      [facility] EYE EXAM ESTABLISHED PT  2003     [facility] REPAIR DETACH RETINA,SCLERAL BUCKLE  1967    Right eye       SOCIAL HISTORY:  Social History     Socioeconomic History     Marital status:      Spouse name: Yoni     Number of children: 2     Years of education: 18   Occupational History     Occupation: Teacher     Employer: [employer]     Comment: retired   Tobacco Use     Smoking status: Never Smoker     Smokeless tobacco: Never Used   Substance and Sexual Activity     Alcohol use: No     Alcohol/week: 0.0 standard drinks     Drug use: No     Sexual activity: Yes     Partners: Female   Other Topics Concern     Caffeine Concern Yes     Comment: 2-4 cups daily     Special Diet Yes     Comment: low fat, low cholesterol     Exercise Yes     Comment: 90 mins daily     Seat Belt Yes       FAMILY HISTORY:  Family History   Problem Relation Age of Onset     Diabetes Father      C.A.D. Father      Cancer - colorectal No family hx of      Prostate Cancer No family hx of   "      MEDS: ASPIRIN 81 MG OR TABS, 1 TABLET DAILY  atorvastatin (LIPITOR) 10 MG tablet, Take 1 tablet (10 mg) by mouth daily  gabapentin (NEURONTIN) 300 MG capsule, Take 300 mg by mouth At Bedtime  hydrocortisone (PROCTO-MED HC) 2.5 % cream, apply to the anus twice daily as needed  latanoprost (XALATAN) 0.005 % ophthalmic solution, Place 1 drop into the right eye At Bedtime   Psyllium (METAMUCIL FIBER PO), Take by mouth daily   valACYclovir (VALTREX) 1000 mg tablet, Take 1 tablet (1,000 mg) by mouth 3 times daily (Patient not taking: Reported on 9/16/2022)    No current facility-administered medications on file prior to visit.      ALLERGIES:   Allergies   Allergen Reactions     Cats      No Known Allergies        PHYSICAL EXAM:  Vitals: BP 92/52   Pulse 68   Ht 1.753 m (5' 9\")   Wt 71.3 kg (157 lb 1.6 oz)   BMI 23.20 kg/m    Constitutional:  cooperative, alert and oriented, well developed, well nourished, in no acute distress thin      Skin:  warm and dry to the touch, no apparent skin lesions or masses noted        Head:  normocephalic, no masses or lesions        Eyes:  pupils equal and round;conjunctivae and lids unremarkable;sclera white        ENT:  no pallor or cyanosis        Neck:  no carotid bruit;JVP normal        Respiratory:  normal breath sounds, clear to auscultation, normal A-P diameter, normal symmetry, normal respiratory excursion, no use of accessory muscles        Cardiac: no murmurs, gallops or rubs detected;normal S1 and S2 irregular rhythm                GI:  abdomen soft;BS normoactive        Vascular: pulses full and equal                                      Extremities and Musculoskeletal:  no edema;no spinal abnormalities noted;normal muscle strength and tone        Neurological:  no gross motor deficits              LABS/DATA:  I reviewed the following:  FLP pending today      Component      Latest Ref Rng & Units 9/16/2022   Sodium      136 - 145 mmol/L 141   Potassium      3.4 - " 5.3 mmol/L 4.3   Chloride      98 - 107 mmol/L 105   Carbon Dioxide (CO2)      22 - 29 mmol/L 26   Anion Gap      7 - 15 mmol/L 10   Urea Nitrogen      8.0 - 23.0 mg/dL 20.5   Creatinine      0.67 - 1.17 mg/dL 0.95   Calcium      8.8 - 10.2 mg/dL 8.9   Glucose      70 - 99 mg/dL 94   GFR Estimate      >60 mL/min/1.73m2 86         EKG 9/16/2022:  Afib,       ASSESSMENT/PLAN:  Sly is a very pleasant, 72-year-old gentleman with a history of coronary artery disease and cardiomyopathy.  He also has a history of mitral regurgitation.      Stress echo 2021 showed old infarct, but no ischemia.  LVEF  35%-40%, but with exercise it was thought to be 45%-50%.  There is mitral regurgitation that was described as mild.    Sly continues to do well.  He fells well without chest discomfort or CHF sxs.      Though he has no sense of palpitations, his rhythm was irregular on exam today.  EKG was done, reviewed with Dr. Mckeon in the clinics, and shows new afib, .  We are uncertain of the duration as he is asx from this.   I will place a 48 Hour Holter to check rate and rhythm.   He will be seen back next week to review and consider medication options vs RICARDO DCCV.   His BP is running low in the 80s at home and I am lowering lisinopril as noted below. I will not add metoprolol today. Instead, will wait to see what his rate control is.   [id number] score is 3 (CHF, Age Vasc). He will continue his ASA 81 mg for now, but we did discuss the need for anticoagulation. He would first like to see what the monitor shows.     BMP 9/16/2022 is WNL, specifically K is WNL.  I will add on  TSH and magnesium given the new afib.    BP is running quite low at home with SBP in the upper 80s and today in clinic 92/52 today on lisinopril 5 mg.  Ideally we would want him on ACEi for his CMY, and since he is asx, we will just lower to 2.5 mg daily.   We may need to stop the ACEi in favor of rate control going forward.        FLP 9/22/21:  , HDL 64, LDL 57, TG 32.  He is on atorvastatin 10 mg.  FLP is pending today.  He would like a copy of the lab results once we get them.               Follow up:  Holter  Follow up next week   Dr. Mensah in 1 year with labs (BMP, FLP)          >40 minutes spent on the date of the encounter doing chart review, review of test results, interpretation of tests, patient visit, documentation and discussion with other provider(s)      ADDENDUM  Component      Latest Ref Rng & Units 9/16/2022   Magnesium      1.7 - 2.3 mg/dL 2.0   TSH      0.30 - 4.20 uIU/mL 1.23     Both WNL  Delvin Dougherty PA-C 9/16/2022 5:17 PM            Delvin Dougherty PA-C

## 2022-09-16 NOTE — PATIENT INSTRUCTIONS
Your blood pressure is running pretty low, so let's trial lowering lisinopril from 5 mg daily to 2.5 mg daily.   You can split your 5 mg tablets in half until gone. Your new prescription will be for 2.5 mg tablets.       Your heart rhythm was irregular today on exam.  The EKG shows what looks like atrial fibrillation. We are unsure when you went into this.   We will place a heart monitor that you will wear for 2 days.  This will check your rhythm and also th heart rate.   We will then get you back into clinic to discuss options for the afib.   We will check the blood again at that time.       Otherwise, continue to do what you are doing.        See Dr. Mensah in a year with labs.

## 2022-10-09 ENCOUNTER — HEALTH MAINTENANCE LETTER (OUTPATIENT)
Age: 72
End: 2022-10-09

## 2022-10-12 ENCOUNTER — OFFICE VISIT (OUTPATIENT)
Dept: CARDIOLOGY | Facility: CLINIC | Age: 72
End: 2022-10-12
Payer: COMMERCIAL

## 2022-10-12 VITALS
SYSTOLIC BLOOD PRESSURE: 111 MMHG | HEIGHT: 69 IN | OXYGEN SATURATION: 97 % | DIASTOLIC BLOOD PRESSURE: 70 MMHG | HEART RATE: 55 BPM | WEIGHT: 158 LBS | BODY MASS INDEX: 23.4 KG/M2

## 2022-10-12 DIAGNOSIS — E78.00 PURE HYPERCHOLESTEROLEMIA: ICD-10-CM

## 2022-10-12 DIAGNOSIS — I25.5 CARDIOMYOPATHY, ISCHEMIC: ICD-10-CM

## 2022-10-12 DIAGNOSIS — I49.5 SSS (SICK SINUS SYNDROME) (H): ICD-10-CM

## 2022-10-12 DIAGNOSIS — I48.0 PAF (PAROXYSMAL ATRIAL FIBRILLATION) (H): Primary | ICD-10-CM

## 2022-10-12 DIAGNOSIS — I25.10 CORONARY ARTERY DISEASE INVOLVING NATIVE CORONARY ARTERY OF NATIVE HEART WITHOUT ANGINA PECTORIS: Chronic | ICD-10-CM

## 2022-10-12 PROCEDURE — 99215 OFFICE O/P EST HI 40 MIN: CPT | Performed by: INTERNAL MEDICINE

## 2022-10-12 NOTE — PROGRESS NOTES
HPI and Plan:   See dictation    Today's clinic visit entailed:  Review of the result(s) of each unique test - Lab work, stress echo  Ordering of each unique test  Prescription drug management  45 minutes spent on the date of the encounter doing chart review, history and exam, documentation and further activities per the note  Provider  Link to [facility] Help Grid     The level of medical decision making during this visit was of moderate complexity.      Orders Placed This Encounter   Procedures     Follow-Up with Cardiology SELENA       Orders Placed This Encounter   Medications     apixaban ANTICOAGULANT (ELIQUIS ANTICOAGULANT) 5 MG tablet     Sig: Take 1 tablet (5 mg) by mouth 2 times daily     Dispense:  180 tablet     Refill:  4       Medications Discontinued During This Encounter   Medication Reason     ASPIRIN 81 MG OR TABS          Encounter Diagnoses   Name Primary?     PAF (paroxysmal atrial fibrillation) (H) Yes     SSS (sick sinus syndrome) (H)      Coronary artery disease involving native coronary artery of native heart without angina pectoris      Cardiomyopathy, ischemic      Pure hypercholesterolemia        CURRENT MEDICATIONS:  Current Outpatient Medications   Medication Sig Dispense Refill     apixaban ANTICOAGULANT (ELIQUIS ANTICOAGULANT) 5 MG tablet Take 1 tablet (5 mg) by mouth 2 times daily 180 tablet 4     atorvastatin (LIPITOR) 10 MG tablet Take 1 tablet (10 mg) by mouth daily 90 tablet 3     gabapentin (NEURONTIN) 300 MG capsule Take 300 mg by mouth At Bedtime       hydrocortisone (PROCTO-MED HC) 2.5 % cream apply to the anus twice daily as needed 30 g 0     latanoprost (XALATAN) 0.005 % ophthalmic solution Place 1 drop into the right eye At Bedtime   5     lisinopril (ZESTRIL) 2.5 MG tablet Take 1 tablet (2.5 mg) by mouth daily 90 tablet 3     Psyllium (METAMUCIL FIBER PO) Take by mouth daily        valACYclovir (VALTREX) 1000 mg tablet Take 1 tablet (1,000 mg) by mouth 3 times daily (Patient not  taking: No sig reported) 21 tablet 1       ALLERGIES     Allergies   Allergen Reactions     Cats      No Known Allergies        PAST MEDICAL HISTORY:  Past Medical History:   Diagnosis Date     Cardiomyopathy, ischemic      Coronary artery disease      Family history of diabetes mellitus     Father     Hyperlipidaemia      Mitral valve disorders(424.0)     mitral regurgitation     Old myocardial infarction 5/04    anterior       PAST SURGICAL HISTORY:  Past Surgical History:   Procedure Laterality Date     HC KNEE SCOPE, DIAGNOSTIC  8/1/2011    Right knee arthroscopy with partial medial meniscectomy     HEART CATH, ANGIOPLASTY  May 2004     intracoronary stent placement of mid LAD May 2004,     HEART CATH, ANGIOPLASTY  June 2004    elective staged stent of OM      ZZC EYE EXAM ESTABLISHED PT  2003     [facility] REPAIR DETACH RETINA,SCLERAL BUCKLE  1967    Right eye       FAMILY HISTORY:  Family History   Problem Relation Age of Onset     Diabetes Father      C.A.D. Father      Cancer - colorectal No family hx of      Prostate Cancer No family hx of        SOCIAL HISTORY:  Social History     Socioeconomic History     Marital status:      Spouse name: Yoni     Number of children: 2     Years of education: 18     Highest education level: None   Occupational History     Occupation: Teacher     Employer: [employer]     Comment: retired   Tobacco Use     Smoking status: Never     Smokeless tobacco: Never   Substance and Sexual Activity     Alcohol use: No     Alcohol/week: 0.0 standard drinks     Drug use: No     Sexual activity: Yes     Partners: Female   Other Topics Concern     Caffeine Concern Yes     Comment: 2-4 cups daily     Special Diet Yes     Comment: low fat, low cholesterol     Exercise Yes     Comment: 90 mins daily     Seat Belt Yes       Review of Systems:  Skin:  Negative       Eyes:  Positive for glasses;glaucoma;cataracts glaucoma of the right eye; cataract extraction of right eye; hx  "of detached retina in right eye - went to eye Dr last week and everything was oK.  ENT:  Negative      Respiratory:  Positive for dyspnea on exertion with stairs very seldomly   Cardiovascular:  Negative Positive for;lightheadedness positional lightheadedness. Feels the best when working out.  Gastroenterology: Negative      Genitourinary:  Negative      Musculoskeletal:  Positive for joint pain;back pain Occasional aging pains  Neurologic:  Positive for numbness or tingling of feet managed with gabapentin.  Psychiatric:  Negative      Heme/Lymph/Imm:  Positive for allergies    Endocrine:  Negative        Physical Exam:  Vitals: /70   Pulse 55   Ht 1.753 m (5' 9\")   Wt 71.7 kg (158 lb)   SpO2 97%   BMI 23.33 kg/m      Constitutional:  cooperative, alert and oriented, well developed, well nourished, in no acute distress thin      Skin:  warm and dry to the touch, no apparent skin lesions or masses noted          Head:  normocephalic, no masses or lesions        Eyes:  pupils equal and round, conjunctivae and lids unremarkable, sclera white, no xanthalasma, EOMS intact, no nystagmus        Lymph:      ENT:  no pallor or cyanosis, dentition good        Neck:  carotid pulses are full and equal bilaterally;no carotid bruit        Respiratory:  normal breath sounds, clear to auscultation, normal A-P diameter, normal symmetry, normal respiratory excursion, no use of accessory muscles         Cardiac: regular rhythm;no murmurs, gallops or rubs detected;normal S1 and S2                pulses full and equal                                        GI:           Extremities and Muscular Skeletal:  no edema;no spinal abnormalities noted;normal muscle strength and tone              Neurological:  no gross motor deficits        Psych:  affect appropriate, oriented to time, person and place        CC  David Mensah MD  [address]  [place] [address]              "

## 2022-10-12 NOTE — PROGRESS NOTES
Service Date: 10/12/2022    HISTORY OF PRESENT ILLNESS:  Mr. Morton is a very nice 72-year-old gentleman who is the model cardiac patient.  I met him in 2004 when he presented with a large anterior wall myocardial infarction, we stented his mid left anterior descending artery.  In a staged fashion, we stented his obtuse marginal.  Initial ejection fraction was 35%-40% with 2 to 3+ mitral regurgitation.  More recent echocardiograms have been in the 40%-50% range with mitral regurgitation now down to trace to mild.    His most recent stress test was a stress echocardiogram a year ago 09/2021, at which time he was able to exercise 10 minutes of standard Herminio protocol without symptoms, EKG changes.  He had a baseline abnormal wall motion abnormality without any stress-induced wall motion abnormality.  Baseline ejection fraction was estimated to be 45%-50%, consistent with an old anterior wall myocardial infarction without significant ischemia.    Last month when he returned for followup with my SELENA, he was found to be in asymptomatic AFib with controlled ventricular response.  Discussion was had about anticoagulation and he wanted to wait and consider his options and see what a Holter monitor noted.  A Holter monitor was performed that shows he is in atrial fibrillation, only 1% of the time.  He did have some short runs of nonsustained ventricular tachycardia, but for the most part was in sinus rhythm, but also did have episodes of junctional rhythm and sinus bradycardia, all consistent with sick sinus syndrome.    Dustin returns to clinic at this time, stating that he is still unaware of his rhythm.  He states he occasionally at periods of rest may have some left shoulder discomfort that is fleeting, lasting just seconds.  He has no lightheadedness, dizziness, syncope or near syncope.      He still is quite Gnosticist about his workout working out 7 days a week.  He does at least 30 minutes where he runs in his basement  are on his elliptical machine.  He then does resistance activity alternating upper body with lower body and again does this 7 days a week.  He also include stretching.  He states he feels best when he is working out.  He does use a pulse oximeter.  At times he does notice his heart rate up to 170.    ASSESSMENT AND PLAN:  Dustin appears to be doing quite well from a cardiac standpoint.  He does not appear to be having any ischemia and this is supported by his stress test of last year.    He does have an ischemic cardiomyopathy, but clearly shows no signs of heart failure.    Given the fact that his sick sinus syndrome and for the most part is asymptomatic, I do not think he will tolerate a beta blocker as he does have episodes of junctional rhythm and sinus bradycardia.  His resting heart rate in sinus rhythm is only in the 50s.  I do not think he would feel good on a beta blocker.    We had an in-depth discussion about anticoagulation.  His CHADS-VASc score is 2 for his age and his coronary history.  After some discussion, we decided we will try Eliquis and we will see what the costs are, whether there is a different NOAC that is favored or whether we have to switch to warfarin.    I congratulated him on his healthy lifestyle.  I encouraged him to continue to do so.    Looking at his fasting lipid profile, he does have a backslide from last year to this year with a total cholesterol being 135, HDL 55, LDL 67, triglycerides are 64.   These are still excellent, but clearly better last year.  In talking about things, he thinks since he is retired, he is less active.  He states he spends more time on the computer, more time probably watching TV.  Like I said, he does do his workout, which is at least an hour a day between his resistance and aerobic activity, but thinks overall he may be less active at this time.    We will continue his current medical regimen, but if the trend continues, he is only on 10 mg of  atorvastatin, we will bump him up to 20 mg.    I will have him follow up in a couple of months with my SELENA given his new onset AFib to see how he is doing, otherwise, I will plan on seeing him back in 1 year.    Thank you for allowing me to participate in his care.    David Mensah MD, [facility]        D: 10/12/2022   T: 10/12/2022   MT: LYSSA    Name:     LINDA MCGOWANCHACHA CHICAS  MRN:      [id number]        Account:      [id number]   :      1950           Service Date: 10/12/2022       Document: [id number]

## 2022-10-12 NOTE — LETTER
10/12/2022    Eriberto Shetty MD  [address], [id number]  [facility] [address]    RE: Sly Morton       Dear Colleague,     I had the pleasure of seeing Sly Morton in the [facility].  HPI and Plan:   See dictation    Today's clinic visit entailed:  Review of the result(s) of each unique test - Lab work, stress echo  Ordering of each unique test  Prescription drug management  45 minutes spent on the date of the encounter doing chart review, history and exam, documentation and further activities per the note  Provider  Link to [facility] Help Grid     The level of medical decision making during this visit was of moderate complexity.      Orders Placed This Encounter   Procedures     Follow-Up with Cardiology SELENA       Orders Placed This Encounter   Medications     apixaban ANTICOAGULANT (ELIQUIS ANTICOAGULANT) 5 MG tablet     Sig: Take 1 tablet (5 mg) by mouth 2 times daily     Dispense:  180 tablet     Refill:  4       Medications Discontinued During This Encounter   Medication Reason     ASPIRIN 81 MG OR TABS          Encounter Diagnoses   Name Primary?     PAF (paroxysmal atrial fibrillation) (H) Yes     SSS (sick sinus syndrome) (H)      Coronary artery disease involving native coronary artery of native heart without angina pectoris      Cardiomyopathy, ischemic      Pure hypercholesterolemia        CURRENT MEDICATIONS:  Current Outpatient Medications   Medication Sig Dispense Refill     apixaban ANTICOAGULANT (ELIQUIS ANTICOAGULANT) 5 MG tablet Take 1 tablet (5 mg) by mouth 2 times daily 180 tablet 4     atorvastatin (LIPITOR) 10 MG tablet Take 1 tablet (10 mg) by mouth daily 90 tablet 3     gabapentin (NEURONTIN) 300 MG capsule Take 300 mg by mouth At Bedtime       hydrocortisone (PROCTO-MED HC) 2.5 % cream apply to the anus twice daily as needed 30 g 0     latanoprost (XALATAN) 0.005 % ophthalmic solution Place 1 drop into the right eye At Bedtime   5     lisinopril (ZESTRIL) 2.5  MG tablet Take 1 tablet (2.5 mg) by mouth daily 90 tablet 3     Psyllium (METAMUCIL FIBER PO) Take by mouth daily        valACYclovir (VALTREX) 1000 mg tablet Take 1 tablet (1,000 mg) by mouth 3 times daily (Patient not taking: No sig reported) 21 tablet 1       ALLERGIES     Allergies   Allergen Reactions     Cats      No Known Allergies        PAST MEDICAL HISTORY:  Past Medical History:   Diagnosis Date     Cardiomyopathy, ischemic      Coronary artery disease      Family history of diabetes mellitus     Father     Hyperlipidaemia      Mitral valve disorders(424.0)     mitral regurgitation     Old myocardial infarction 5/04    anterior       PAST SURGICAL HISTORY:  Past Surgical History:   Procedure Laterality Date      KNEE SCOPE, DIAGNOSTIC  8/1/2011    Right knee arthroscopy with partial medial meniscectomy     HEART CATH, ANGIOPLASTY  May 2004     intracoronary stent placement of mid LAD May 2004,     HEART CATH, ANGIOPLASTY  June 2004    elective staged stent of OM      ZZC EYE EXAM ESTABLISHED PT  2003     ZZC REPAIR DETACH RETINA,SCLERAL BUCKLE  1967    Right eye       FAMILY HISTORY:  Family History   Problem Relation Age of Onset     Diabetes Father      C.A.D. Father      Cancer - colorectal No family hx of      Prostate Cancer No family hx of        SOCIAL HISTORY:  Social History     Socioeconomic History     Marital status:      Spouse name: Yoni     Number of children: 2     Years of education: 18     Highest education level: None   Occupational History     Occupation: Teacher     Employer: [employer]     Comment: retired   Tobacco Use     Smoking status: Never     Smokeless tobacco: Never   Substance and Sexual Activity     Alcohol use: No     Alcohol/week: 0.0 standard drinks     Drug use: No     Sexual activity: Yes     Partners: Female   Other Topics Concern     Caffeine Concern Yes     Comment: 2-4 cups daily     Special Diet Yes     Comment: low fat, low cholesterol  "    Exercise Yes     Comment: 90 mins daily     Seat Belt Yes       Review of Systems:  Skin:  Negative       Eyes:  Positive for glasses;glaucoma;cataracts glaucoma of the right eye; cataract extraction of right eye; hx of detached retina in right eye - went to eye Dr last week and everything was oK.  ENT:  Negative      Respiratory:  Positive for dyspnea on exertion with stairs very seldomly   Cardiovascular:  Negative Positive for;lightheadedness positional lightheadedness. Feels the best when working out.  Gastroenterology: Negative      Genitourinary:  Negative      Musculoskeletal:  Positive for joint pain;back pain Occasional aging pains  Neurologic:  Positive for numbness or tingling of feet managed with gabapentin.  Psychiatric:  Negative      Heme/Lymph/Imm:  Positive for allergies    Endocrine:  Negative        Physical Exam:  Vitals: /70   Pulse 55   Ht 1.753 m (5' 9\")   Wt 71.7 kg (158 lb)   SpO2 97%   BMI 23.33 kg/m      Constitutional:  cooperative, alert and oriented, well developed, well nourished, in no acute distress thin      Skin:  warm and dry to the touch, no apparent skin lesions or masses noted          Head:  normocephalic, no masses or lesions        Eyes:  pupils equal and round, conjunctivae and lids unremarkable, sclera white, no xanthalasma, EOMS intact, no nystagmus        Lymph:      ENT:  no pallor or cyanosis, dentition good        Neck:  carotid pulses are full and equal bilaterally;no carotid bruit        Respiratory:  normal breath sounds, clear to auscultation, normal A-P diameter, normal symmetry, normal respiratory excursion, no use of accessory muscles         Cardiac: regular rhythm;no murmurs, gallops or rubs detected;normal S1 and S2                pulses full and equal                                        GI:           Extremities and Muscular Skeletal:  no edema;no spinal abnormalities noted;normal muscle strength and tone              Neurological:  no " gross motor deficits        Psych:  affect appropriate, oriented to time, person and place        CC  David Mensah MD  [address]  [place] [id number]                Service Date: 10/12/2022    HISTORY OF PRESENT ILLNESS:  Mr. Morton is a very nice 72-year-old gentleman who is the model cardiac patient.  I met him in 2004 when he presented with a large anterior wall myocardial infarction, we stented his mid left anterior descending artery.  In a staged fashion, we stented his obtuse marginal.  Initial ejection fraction was 35%-40% with 2 to 3+ mitral regurgitation.  More recent echocardiograms have been in the 40%-50% range with mitral regurgitation now down to trace to mild.    His most recent stress test was a stress echocardiogram a year ago 09/2021, at which time he was able to exercise 10 minutes of standard Herminio protocol without symptoms, EKG changes.  He had a baseline abnormal wall motion abnormality without any stress-induced wall motion abnormality.  Baseline ejection fraction was estimated to be 45%-50%, consistent with an old anterior wall myocardial infarction without significant ischemia.    Last month when he returned for followup with my SELENA, he was found to be in asymptomatic AFib with controlled ventricular response.  Discussion was had about anticoagulation and he wanted to wait and consider his options and see what a Holter monitor noted.  A Holter monitor was performed that shows he is in atrial fibrillation, only 1% of the time.  He did have some short runs of nonsustained ventricular tachycardia, but for the most part was in sinus rhythm, but also did have episodes of junctional rhythm and sinus bradycardia, all consistent with sick sinus syndrome.    Dustin returns to clinic at this time, stating that he is still unaware of his rhythm.  He states he occasionally at periods of rest may have some left shoulder discomfort that is fleeting, lasting just seconds.  He has no  lightheadedness, dizziness, syncope or near syncope.      He still is quite Samaritan about his workout working out 7 days a week.  He does at least 30 minutes where he runs in his basement are on his elliptical machine.  He then does resistance activity alternating upper body with lower body and again does this 7 days a week.  He also include stretching.  He states he feels best when he is working out.  He does use a pulse oximeter.  At times he does notice his heart rate up to 170.    ASSESSMENT AND PLAN:  Dustin appears to be doing quite well from a cardiac standpoint.  He does not appear to be having any ischemia and this is supported by his stress test of last year.    He does have an ischemic cardiomyopathy, but clearly shows no signs of heart failure.    Given the fact that his sick sinus syndrome and for the most part is asymptomatic, I do not think he will tolerate a beta blocker as he does have episodes of junctional rhythm and sinus bradycardia.  His resting heart rate in sinus rhythm is only in the 50s.  I do not think he would feel good on a beta blocker.    We had an in-depth discussion about anticoagulation.  His CHADS-VASc score is 2 for his age and his coronary history.  After some discussion, we decided we will try Eliquis and we will see what the costs are, whether there is a different NOAC that is favored or whether we have to switch to warfarin.    I congratulated him on his healthy lifestyle.  I encouraged him to continue to do so.    Looking at his fasting lipid profile, he does have a backslide from last year to this year with a total cholesterol being 135, HDL 55, LDL 67, triglycerides are 64.   These are still excellent, but clearly better last year.  In talking about things, he thinks since he is retired, he is less active.  He states he spends more time on the computer, more time probably watching TV.  Like I said, he does do his workout, which is at least an hour a day between his  resistance and aerobic activity, but thinks overall he may be less active at this time.    We will continue his current medical regimen, but if the trend continues, he is only on 10 mg of atorvastatin, we will bump him up to 20 mg.    I will have him follow up in a couple of months with my SELENA given his new onset AFib to see how he is doing, otherwise, I will plan on seeing him back in 1 year.    Thank you for allowing me to participate in his care.    David Mensah MD, [facility]        D: 10/12/2022   T: 10/12/2022   MT: LYSSA    Name:     LENA MCGOWAN  MRN:      [id number]        Account:      [id number]   :      1950           Service Date: 10/12/2022       Document: [id number]    Thank you for allowing me to participate in the care of your patient.      Sincerely,     David Mensah MD   [facility]  cc:   David Mensah MD  [address]  [place] [address]

## 2022-10-17 ENCOUNTER — DOCUMENTATION ONLY (OUTPATIENT)
Dept: CARDIOLOGY | Facility: CLINIC | Age: 72
End: 2022-10-17

## 2022-10-17 ENCOUNTER — ALLIED HEALTH/NURSE VISIT (OUTPATIENT)
Dept: CARDIOLOGY | Facility: CLINIC | Age: 72
End: 2022-10-17
Payer: COMMERCIAL

## 2022-10-17 VITALS — SYSTOLIC BLOOD PRESSURE: 149 MMHG | DIASTOLIC BLOOD PRESSURE: 85 MMHG | OXYGEN SATURATION: 98 % | HEART RATE: 45 BPM

## 2022-10-17 DIAGNOSIS — I10 HTN (HYPERTENSION): Primary | ICD-10-CM

## 2022-10-17 PROCEDURE — 99207 PR NO CHARGE LOS: CPT

## 2022-10-17 NOTE — PROGRESS NOTES
[facility] BLOOD PRESSURE CHECK     Last office visit: 10/12/22 w/      Previous blood pressure: 111/70  Previous heart rate: 55      Time of visit: 10:33    Morning medications were taken at: 8-9am/8-9pm     Today's blood pressure: 149/85  Today's heart rate: 45 bpm     Home monitor blood pressure: N/A mmHg  Home monitor heart rate: N/A bpm      Additional Comments: Pt wanted to know what the reason for taking the Eliquis is.     Also Pt received paper work with confusing information on it about the lab draws, I explained what was going on and Pt understood.       Results routed to: Delvin Dougherty/Nicole Cummings       Ordering Provider: Delvin Dougherty  In clinic Provider: Dr. Gallego

## 2022-10-24 NOTE — PROGRESS NOTES
VM was left to further discuss patients questions on why he is on ELiquis. Paul back number provided.  Nicole FLANAGAN RN  10/24/22 at 12:30 PM

## 2022-10-26 ENCOUNTER — TELEPHONE (OUTPATIENT)
Dept: CARDIOLOGY | Facility: CLINIC | Age: 72
End: 2022-10-26

## 2022-10-26 ENCOUNTER — DOCUMENTATION ONLY (OUTPATIENT)
Dept: CARDIOLOGY | Facility: CLINIC | Age: 72
End: 2022-10-26

## 2022-10-26 NOTE — TELEPHONE ENCOUNTER
Dr. Mensah's reply - 10-26-22  Thank you lets document it so we do not get a ding for uncontrolled hypertension.

## 2022-10-26 NOTE — PROGRESS NOTES
Blood pressure check scheduled on accident. Please see telephone encounter 10/26/22 - blood pressure readings addressed.    Nicole FLANAGAN RN  10/26/22 at 2:07 PM

## 2022-10-26 NOTE — TELEPHONE ENCOUNTER
Dustin called to provide B/P readings beginning on 10/24/22. 10/24/22 B/P 117/67 pulse 49 9:15 am, B/P 117/67 pulse 43 1:45 pm, B/P 109/61 pulse 45 11:15 pm. 10/25/22 B/P 111/69 pulse 55 8 am, B/P 110/67 pulse 43 1:45 pm, B/P 118/69 pulse 45 10:15 pm. 10/26/22 B/P 110/63 pulse 40 8 am.       Last Dr. Mensah OV 10-12-22.   BP at visit was 149/85 HR 45.

## 2022-10-26 NOTE — TELEPHONE ENCOUNTER
Message left regarding BP's reviewed by Dr. Mensah. No changes at this time. Please call back with questions or concerns.

## 2022-10-26 NOTE — TELEPHONE ENCOUNTER
[facility] Call Center    Phone Message    May a detailed message be left on voicemail: no     Reason for Call: Other: Dustin called to provide B/P readings beginning on 10/24/22. 10/24/22 B/P 117/67 pulse 49 9:15 am, B/P 117/67 pulse 43 1:45 pm, B/P 109/61 pulse 45 11:15 pm. 10/25/22 B/P 111/69 pulse 55 8 am, B/P 110/67 pulse 43 1:45 pm, B/P 118/69 pulse 45 10:15 pm. 10/26/22 B/P 110/63 pulse 40 8 am.       Action Taken: Other: [facility]    Travel Screening: Not Applicable

## 2022-10-28 DIAGNOSIS — I25.10 CORONARY ARTERY DISEASE INVOLVING NATIVE CORONARY ARTERY OF NATIVE HEART WITHOUT ANGINA PECTORIS: ICD-10-CM

## 2022-10-28 RX ORDER — ATORVASTATIN CALCIUM 10 MG/1
10 TABLET, FILM COATED ORAL DAILY
Qty: 90 TABLET | Refills: 3 | Status: SHIPPED | OUTPATIENT
Start: 2022-10-28 | End: 2023-09-27

## 2022-12-16 ENCOUNTER — OFFICE VISIT (OUTPATIENT)
Dept: CARDIOLOGY | Facility: CLINIC | Age: 72
End: 2022-12-16
Attending: INTERNAL MEDICINE
Payer: COMMERCIAL

## 2022-12-16 VITALS
OXYGEN SATURATION: 97 % | WEIGHT: 160 LBS | DIASTOLIC BLOOD PRESSURE: 68 MMHG | HEIGHT: 69 IN | BODY MASS INDEX: 23.7 KG/M2 | HEART RATE: 52 BPM | SYSTOLIC BLOOD PRESSURE: 120 MMHG

## 2022-12-16 DIAGNOSIS — I48.0 PAF (PAROXYSMAL ATRIAL FIBRILLATION) (H): ICD-10-CM

## 2022-12-16 DIAGNOSIS — I25.10 CORONARY ARTERY DISEASE INVOLVING NATIVE CORONARY ARTERY OF NATIVE HEART WITHOUT ANGINA PECTORIS: Primary | ICD-10-CM

## 2022-12-16 PROCEDURE — 99214 OFFICE O/P EST MOD 30 MIN: CPT | Performed by: PHYSICIAN ASSISTANT

## 2022-12-16 RX ORDER — NITROGLYCERIN 0.4 MG/1
TABLET SUBLINGUAL
Qty: 25 TABLET | Refills: 1 | Status: SHIPPED | OUTPATIENT
Start: 2022-12-16

## 2022-12-16 NOTE — LETTER
2022    Eriberto Shetty MD  [address], [id number]  [facility] [address]    RE: Sly Morton       Dear Colleague,     I had the pleasure of seeing Sly Morton in the [facility].      CARDIOLOGY CLINIC PROGRESS NOTE    DOS: 2022      Sly Morton  : 1950, 72 year old  MRN: [id number]      Primary Cardiologist:  Dr. Mensah     HPI:  Sly Morton is a 72 year old male with past medical history including coronary artery disease dating back to  when he presented with an anterior wall myocardial infarction.  He underwent stenting of his mid LAD.  In a staged fashion, he underwent stenting of the OM.  Ejection fraction was 35-40% with 2-3+ mitral regurgitation.    Stress nuclear scan in 2012 demonstrated a small fixed anteroapical defect consistent with a nontransmural infarct, and there was inferior diaphragmatic attenuation.  Ejection fraction was estimated to be 47%.   Echocardiogram 18: LVEF 35-40% with trace to mild mitral regurgitation, unchanged from 2016.   Echo 19: LVEF 40-45%, mild MR.      Stress echocardiogram 21: he was able to exercise 10 minutes without symptoms or EKG changes.  Obviously, he had echocardiographic evidence of his old infarct but no evidence of any ischemia.  Baseline ejection fraction was felt to be 35%-40%, but with exercise it was thought to be 45%-50%.  There is mitral regurgitation that was described as mild.     2022 he was found to be in asymptomatic AFib with controlled ventricular response.  Discussion was had about anticoagulation and he wanted to wait and consider his options and see what a Holter monitor noted.  A Holter monitor was performed that shows he is in atrial fibrillation, only 1% of the time.  He did have some short runs of nonsustained ventricular tachycardia, but for the most part was in sinus rhythm, but also did have episodes of junctional rhythm and sinus bradycardia, all  consistent with sick sinus syndrome.    He saw Dr. Mensah 10/12/22. He started Eliquis 5 mg BID. A BB was not started given the Holter showed junctional rhythm and sinus bradycardia.       Sly presents today for his follow up.    He is on anticoagulation. He is tolerating this. No bleeding concerns.   He tells me he continues to do very well from a cardiac standpoint without any chest pain, shortness of breath, palpitations, edema, syncope.      He works out daily.  He does stretching for 30 minutes followed elliptical daily for about 20 minutes. Tries to do jogging. He also does pushups, kettle ball, core strength. He does an hour a day.  No sxs with working out.   He works hard on a healthy diet, though does allow some treats and snacks at some times. He has cut down on his caffeine intake.   BP controlled.         ROS:  Skin:        Eyes:       ENT:       Respiratory:  Negative    Cardiovascular:  Negative    Gastroenterology:      Genitourinary:       Musculoskeletal:       Neurologic:       Psychiatric:       Heme/Lymph/Imm:       Endocrine:         PAST MEDICAL HISTORY:  Past Medical History:   Diagnosis Date     Cardiomyopathy, ischemic      Coronary artery disease      Family history of diabetes mellitus     Father     Hyperlipidaemia      Mitral valve disorders(424.0)     mitral regurgitation     Old myocardial infarction 5/04    anterior       PAST SURGICAL HISTORY:  Past Surgical History:   Procedure Laterality Date     HC KNEE SCOPE, DIAGNOSTIC  8/1/2011    Right knee arthroscopy with partial medial meniscectomy     HEART CATH, ANGIOPLASTY  May 2004     intracoronary stent placement of mid LAD May 2004,     HEART CATH, ANGIOPLASTY  June 2004    elective staged stent of OM      ZZC EYE EXAM ESTABLISHED PT  2003     [email or web address] REPAIR DETACH RETINA,SCLERAL BUCKLE  1967    Right eye       SOCIAL HISTORY:  Social History     Socioeconomic History     Marital status:      Spouse name: Yoni     Number of  "children: 2     Years of education: 18   Occupational History     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 Father      Cancer - colorectal No family hx of      Prostate Cancer No family hx of        MEDS: apixaban ANTICOAGULANT (ELIQUIS ANTICOAGULANT) 5 MG tablet, Take 1 tablet (5 mg) by mouth 2 times daily  atorvastatin (LIPITOR) 10 MG tablet, Take 1 tablet (10 mg) by mouth daily  gabapentin (NEURONTIN) 300 MG capsule, Take 300 mg by mouth At Bedtime  hydrocortisone (PROCTO-MED HC) 2.5 % cream, apply to the anus twice daily as needed  latanoprost (XALATAN) 0.005 % ophthalmic solution, Place 1 drop into the right eye At Bedtime   lisinopril (ZESTRIL) 2.5 MG tablet, Take 1 tablet (2.5 mg) by mouth daily  Psyllium (METAMUCIL FIBER PO), Take by mouth daily   valACYclovir (VALTREX) 1000 mg tablet, Take 1 tablet (1,000 mg) by mouth 3 times daily (Patient not taking: Reported on 9/16/2022)    No current facility-administered medications on file prior to visit.      ALLERGIES:   Allergies   Allergen Reactions     Cats      No Known Allergies        PHYSICAL EXAM:  Vitals: /68 (BP Location: Right arm, Patient Position: Sitting, Cuff Size: Adult Regular)   Pulse 52   Ht 1.753 m (5' 9\")   Wt 72.6 kg (160 lb)   SpO2 97%   BMI 23.63 kg/m    Constitutional:  cooperative, alert and oriented, well developed, well nourished, in no acute distress thin  "     Skin:  warm and dry to the touch, no apparent skin lesions or masses noted        Head:  normocephalic, no masses or lesions        Eyes:  pupils equal and round;conjunctivae and lids unremarkable;sclera white        ENT:  no pallor or cyanosis        Neck:  JVP normal        Respiratory:  normal breath sounds, clear to auscultation, normal A-P diameter, normal symmetry, normal respiratory excursion, no use of accessory muscles        Cardiac: regular rhythm;no murmurs, gallops or rubs detected;normal S1 and S2                  GI:  abdomen soft;BS normoactive        Vascular: pulses full and equal                                      Extremities and Musculoskeletal:  no edema;no spinal abnormalities noted;normal muscle strength and tone        Neurological:  no gross motor deficits              LABS/DATA:  I reviewed the following:  No new labs      ASSESSMENT/PLAN:  Sly is a very pleasant, 72-year-old gentleman with a history of coronary artery disease, cardiomyopathy, paroxysmal atrial fibrillation.  He also has a history of mitral regurgitation.        CAD  CMY  - 2004 presented with an anterior wall myocardial infarction.  He underwent stenting of his mid LAD.  In a staged fashion, he underwent stenting of the OM.    - Stress echo 2021 showed old infarct, but no ischemia.  LVEF  35%-40%, but with exercise it was thought to be 45%-50%.    There is mitral regurgitation that was described as mild.  - Sly continues to do well.  He fells well without chest discomfort or CHF sxs.    - He is off ASA 81 mg and now on Eliquis  - Lisinopril 2.5 mg  - No BB due to bradycardia  - FLP 9/16/22: , HDL 55, LDL 67, TG 64.  He is on atorvastatin 10 mg.  He numbers are still controlled but trending up. We will continue current regimen and in the future may need to increase atorvastatin.       Asx PAF  - Noted to be irr on 9/2022 office visit and EKG showed afib, monitor showed only 1% afib  - He is not on a BB due  to junctional rhythm and sinus niurka noted on the monitor  - [id number] score is 3 (CHF, Age Vasc), he is on Eliquis 5 mg BID and tolerating         Follow up:  Dr. Mensah in 1 year with labs (BMP, FLP, hgb)      Delvin Dougherty PA-C      Thank you for allowing me to participate in the care of your patient.      Sincerely,     Delvin Dougherty PA-C     [facility]  cc:   David Mensah MD  [address] [place] [id number]

## 2022-12-16 NOTE — PROGRESS NOTES
CARDIOLOGY CLINIC PROGRESS NOTE    DOS: 2022      Sly Morton  : 1950, 72 year old  MRN: [id number]      Primary Cardiologist:  Dr. Mensah     HPI:  Sly Morton is a 72 year old male with past medical history including coronary artery disease dating back to  when he presented with an anterior wall myocardial infarction.  He underwent stenting of his mid LAD.  In a staged fashion, he underwent stenting of the OM.  Ejection fraction was 35-40% with 2-3+ mitral regurgitation.    Stress nuclear scan in 2012 demonstrated a small fixed anteroapical defect consistent with a nontransmural infarct, and there was inferior diaphragmatic attenuation.  Ejection fraction was estimated to be 47%.   Echocardiogram 18: LVEF 35-40% with trace to mild mitral regurgitation, unchanged from 2016.   Echo 19: LVEF 40-45%, mild MR.      Stress echocardiogram 21: he was able to exercise 10 minutes without symptoms or EKG changes.  Obviously, he had echocardiographic evidence of his old infarct but no evidence of any ischemia.  Baseline ejection fraction was felt to be 35%-40%, but with exercise it was thought to be 45%-50%.  There is mitral regurgitation that was described as mild.     2022 he was found to be in asymptomatic AFib with controlled ventricular response.  Discussion was had about anticoagulation and he wanted to wait and consider his options and see what a Holter monitor noted.  A Holter monitor was performed that shows he is in atrial fibrillation, only 1% of the time.  He did have some short runs of nonsustained ventricular tachycardia, but for the most part was in sinus rhythm, but also did have episodes of junctional rhythm and sinus bradycardia, all consistent with sick sinus syndrome.    He saw Dr. Mensah 10/12/22. He started Eliquis 5 mg BID. A BB was not started given the Holter showed junctional rhythm and sinus bradycardia.       Sly presents today for his follow  up.    He is on anticoagulation. He is tolerating this. No bleeding concerns.   He tells me he continues to do very well from a cardiac standpoint without any chest pain, shortness of breath, palpitations, edema, syncope.      He works out daily.  He does stretching for 30 minutes followed elliptical daily for about 20 minutes. Tries to do jogging. He also does pushups, kettle ball, core strength. He does an hour a day.  No sxs with working out.   He works hard on a healthy diet, though does allow some treats and snacks at some times. He has cut down on his caffeine intake.   BP controlled.         ROS:  Skin:        Eyes:       ENT:       Respiratory:  Negative    Cardiovascular:  Negative    Gastroenterology:      Genitourinary:       Musculoskeletal:       Neurologic:       Psychiatric:       Heme/Lymph/Imm:       Endocrine:         PAST MEDICAL HISTORY:  Past Medical History:   Diagnosis Date     Cardiomyopathy, ischemic      Coronary artery disease      Family history of diabetes mellitus     Father     Hyperlipidaemia      Mitral valve disorders(424.0)     mitral regurgitation     Old myocardial infarction 5/04    anterior       PAST SURGICAL HISTORY:  Past Surgical History:   Procedure Laterality Date     HC KNEE SCOPE, DIAGNOSTIC  8/1/2011    Right knee arthroscopy with partial medial meniscectomy     HEART CATH, ANGIOPLASTY  May 2004     intracoronary stent placement of mid LAD May 2004,     HEART CATH, ANGIOPLASTY  June 2004    elective staged stent of OM      ZZC EYE EXAM ESTABLISHED PT  2003     [facility] REPAIR DETACH RETINA,SCLERAL BUCKLE  1967    Right eye       SOCIAL HISTORY:  Social History     Socioeconomic History     Marital status:      Spouse name: Yoni     Number of children: 2     Years of education: 18   Occupational History     Occupation: Teacher     Employer: [employer]     Comment: retired   Tobacco Use     Smoking status: Never Smoker     Smokeless tobacco: Never Used  "  Substance and Sexual Activity     Alcohol use: No     Alcohol/week: 0.0 standard drinks     Drug use: No     Sexual activity: Yes     Partners: Female   Other Topics Concern     Caffeine Concern Yes     Comment: 2-4 cups daily     Special Diet Yes     Comment: low fat, low cholesterol     Exercise Yes     Comment: 90 mins daily     Seat Belt Yes       FAMILY HISTORY:  Family History   Problem Relation Age of Onset     Diabetes Father      C.A.D. Father      Cancer - colorectal No family hx of      Prostate Cancer No family hx of        MEDS: apixaban ANTICOAGULANT (ELIQUIS ANTICOAGULANT) 5 MG tablet, Take 1 tablet (5 mg) by mouth 2 times daily  atorvastatin (LIPITOR) 10 MG tablet, Take 1 tablet (10 mg) by mouth daily  gabapentin (NEURONTIN) 300 MG capsule, Take 300 mg by mouth At Bedtime  hydrocortisone (PROCTO-MED HC) 2.5 % cream, apply to the anus twice daily as needed  latanoprost (XALATAN) 0.005 % ophthalmic solution, Place 1 drop into the right eye At Bedtime   lisinopril (ZESTRIL) 2.5 MG tablet, Take 1 tablet (2.5 mg) by mouth daily  Psyllium (METAMUCIL FIBER PO), Take by mouth daily   valACYclovir (VALTREX) 1000 mg tablet, Take 1 tablet (1,000 mg) by mouth 3 times daily (Patient not taking: Reported on 9/16/2022)    No current facility-administered medications on file prior to visit.      ALLERGIES:   Allergies   Allergen Reactions     Cats      No Known Allergies        PHYSICAL EXAM:  Vitals: /68 (BP Location: Right arm, Patient Position: Sitting, Cuff Size: Adult Regular)   Pulse 52   Ht 1.753 m (5' 9\")   Wt 72.6 kg (160 lb)   SpO2 97%   BMI 23.63 kg/m    Constitutional:  cooperative, alert and oriented, well developed, well nourished, in no acute distress thin      Skin:  warm and dry to the touch, no apparent skin lesions or masses noted        Head:  normocephalic, no masses or lesions        Eyes:  pupils equal and round;conjunctivae and lids unremarkable;sclera white        ENT:  no " pallor or cyanosis        Neck:  JVP normal        Respiratory:  normal breath sounds, clear to auscultation, normal A-P diameter, normal symmetry, normal respiratory excursion, no use of accessory muscles        Cardiac: regular rhythm;no murmurs, gallops or rubs detected;normal S1 and S2                  GI:  abdomen soft;BS normoactive        Vascular: pulses full and equal                                      Extremities and Musculoskeletal:  no edema;no spinal abnormalities noted;normal muscle strength and tone        Neurological:  no gross motor deficits              LABS/DATA:  I reviewed the following:  No new labs      ASSESSMENT/PLAN:  Sly is a very pleasant, 72-year-old gentleman with a history of coronary artery disease, cardiomyopathy, paroxysmal atrial fibrillation.  He also has a history of mitral regurgitation.        CAD  CMY  - 2004 presented with an anterior wall myocardial infarction.  He underwent stenting of his mid LAD.  In a staged fashion, he underwent stenting of the OM.    - Stress echo 2021 showed old infarct, but no ischemia.  LVEF  35%-40%, but with exercise it was thought to be 45%-50%.    There is mitral regurgitation that was described as mild.  - Sly continues to do well.  He fells well without chest discomfort or CHF sxs.    - He is off ASA 81 mg and now on Eliquis  - Lisinopril 2.5 mg  - No BB due to bradycardia  - FLP 9/16/22: , HDL 55, LDL 67, TG 64.  He is on atorvastatin 10 mg.  He numbers are still controlled but trending up. We will continue current regimen and in the future may need to increase atorvastatin.       Asx PAF  - Noted to be irr on 9/2022 office visit and EKG showed afib, monitor showed only 1% afib  - He is not on a BB due to junctional rhythm and sinus niurka noted on the monitor  - [id number] score is 3 (CHF, Age Vasc), he is on Eliquis 5 mg BID and tolerating         Follow up:  Dr. Mensah in 1 year with labs (BMP, FLP, hgb)          Delvin COPE  CECIL Dougherty

## 2023-01-25 ENCOUNTER — OFFICE VISIT (OUTPATIENT)
Dept: FAMILY MEDICINE | Facility: CLINIC | Age: 73
End: 2023-01-25

## 2023-01-25 DIAGNOSIS — U07.1 INFECTION DUE TO 2019 NOVEL CORONAVIRUS: Primary | ICD-10-CM

## 2023-01-25 PROCEDURE — 99214 OFFICE O/P EST MOD 30 MIN: CPT | Mod: GT | Performed by: PHYSICIAN ASSISTANT

## 2023-01-25 RX ORDER — TRIAMCINOLONE ACETONIDE 1 MG/G
OINTMENT TOPICAL
COMMUNITY
Start: 2022-10-20 | End: 2023-06-12

## 2023-01-25 NOTE — PROGRESS NOTES
Assessment & Plan     Infection due to 2019 novel coronavirus-discussed risk vs benefits and side effects of medication and patient wishes to proceed with prescription therapy   Quarantine through 1/29, mask through 2/3/23  -Rest, increase fluids, honey for cough suppression/sore throat  -Add Mucinex for symptomatic relief  -Ibuprofen/Tylenol as needed for symptomatic relief  Seek emergency care for significant shortness of breath, chest pain, and/or fever >103F that cannot be controlled with antipyretics   Stop atorvastatin for 10 days, take 1/2 tab Eliquis BID for 5 days, increase to normal dose after Paxlovid is done-pt understands this       Follow up as needed    No follow-ups on file.    Richard Ferreira PA-C  [facility]    Subjective   Sly is a 72 year old, presenting for the following health issues:  No chief complaint on file.      HPI     Phone-Visit Details    Type of service:  Video Visit    Phone Start Time (time video started): 1542    Phone End Time (time video stopped): 1555    Originating Location (pt. Location): Home        Distant Location (provider location):  On-site    Mode of Communication:  Phone    Richard Ferreira PA-C          COVID-19 Symptom Review  How many days ago did these symptoms start? 1/24/23  Positive test 1/25/23  Are any of the following symptoms significant for you?    New or worsening difficulty breathing? No    Worsening cough? Yes, it's a dry cough.     Fever or chills? Yes, I felt feverish or had chills.    Headache: YES    Sore throat: No    Chest pain: No    Diarrhea: No    Body aches? YES    What treatments has patient tried? Robitussin, Tylenol   Does patient live in a nursing home, group home, or shelter? No  Does patient have a way to get food/medications during quarantined? Yes, I have a friend or family member who can help me.          Last Cr .95 9/22    Interactions: atorvastatin and Eliquis (decrease to 2.5mg BID)        Review of Systems    Constitutional, HEENT, cardiovascular, pulmonary, gi and gu systems are negative, except as otherwise noted.      Objective    There were no vitals taken for this visit.  There is no height or weight on file to calculate BMI.  Physical Exam   GENERAL: alert and no distress  RESP: no respiratory distress, breathing at normal rate    Exam limited by camera placement and telemedicine visit.

## 2023-03-25 ENCOUNTER — HEALTH MAINTENANCE LETTER (OUTPATIENT)
Age: 73
End: 2023-03-25

## 2023-06-04 ENCOUNTER — TELEPHONE (OUTPATIENT)
Dept: CARDIOLOGY | Facility: CLINIC | Age: 73
End: 2023-06-04

## 2023-06-04 ENCOUNTER — HOSPITAL ENCOUNTER (EMERGENCY)
Facility: CLINIC | Age: 73
Discharge: HOME | End: 2023-06-04
Attending: EMERGENCY MEDICINE | Admitting: EMERGENCY MEDICINE
Payer: COMMERCIAL

## 2023-06-04 VITALS
RESPIRATION RATE: 16 BRPM | TEMPERATURE: 98.7 F | OXYGEN SATURATION: 100 % | HEART RATE: 58 BPM | DIASTOLIC BLOOD PRESSURE: 80 MMHG | SYSTOLIC BLOOD PRESSURE: 117 MMHG

## 2023-06-04 DIAGNOSIS — R42 LIGHTHEADEDNESS: ICD-10-CM

## 2023-06-04 DIAGNOSIS — H53.9 VISION CHANGES: ICD-10-CM

## 2023-06-04 LAB
ANION GAP SERPL CALCULATED.3IONS-SCNC: 8 MMOL/L (ref 7–15)
BASOPHILS # BLD AUTO: 0 10E3/UL (ref 0–0.2)
BASOPHILS NFR BLD AUTO: 1 %
BUN SERPL-MCNC: 26.9 MG/DL (ref 8–23)
CALCIUM SERPL-MCNC: 8.6 MG/DL (ref 8.8–10.2)
CHLORIDE SERPL-SCNC: 104 MMOL/L (ref 98–107)
CREAT SERPL-MCNC: 0.99 MG/DL (ref 0.67–1.17)
DEPRECATED HCO3 PLAS-SCNC: 23 MMOL/L (ref 22–29)
EOSINOPHIL # BLD AUTO: 0 10E3/UL (ref 0–0.7)
EOSINOPHIL NFR BLD AUTO: 1 %
ERYTHROCYTE [DISTWIDTH] IN BLOOD BY AUTOMATED COUNT: 12.4 % (ref 10–15)
GFR SERPL CREATININE-BSD FRML MDRD: 82 ML/MIN/1.73M2
GLUCOSE SERPL-MCNC: 137 MG/DL (ref 70–99)
HCT VFR BLD AUTO: 39.6 % (ref 40–53)
HGB BLD-MCNC: 13.2 G/DL (ref 13.3–17.7)
HOLD SPECIMEN: NORMAL
HOLD SPECIMEN: NORMAL
IMM GRANULOCYTES # BLD: 0 10E3/UL
IMM GRANULOCYTES NFR BLD: 0 %
LYMPHOCYTES # BLD AUTO: 1.1 10E3/UL (ref 0.8–5.3)
LYMPHOCYTES NFR BLD AUTO: 28 %
MAGNESIUM SERPL-MCNC: 2.4 MG/DL (ref 1.7–2.3)
MCH RBC QN AUTO: 31.6 PG (ref 26.5–33)
MCHC RBC AUTO-ENTMCNC: 33.3 G/DL (ref 31.5–36.5)
MCV RBC AUTO: 95 FL (ref 78–100)
MONOCYTES # BLD AUTO: 0.3 10E3/UL (ref 0–1.3)
MONOCYTES NFR BLD AUTO: 9 %
NEUTROPHILS # BLD AUTO: 2.4 10E3/UL (ref 1.6–8.3)
NEUTROPHILS NFR BLD AUTO: 61 %
NRBC # BLD AUTO: 0 10E3/UL
NRBC BLD AUTO-RTO: 0 /100
PLATELET # BLD AUTO: 191 10E3/UL (ref 150–450)
POTASSIUM SERPL-SCNC: 4.3 MMOL/L (ref 3.4–5.3)
RBC # BLD AUTO: 4.18 10E6/UL (ref 4.4–5.9)
SODIUM SERPL-SCNC: 135 MMOL/L (ref 136–145)
TROPONIN T SERPL HS-MCNC: 16 NG/L
TROPONIN T SERPL HS-MCNC: 16 NG/L
TROPONIN T SERPL HS-MCNC: 18 NG/L
WBC # BLD AUTO: 3.9 10E3/UL (ref 4–11)

## 2023-06-04 PROCEDURE — 36415 COLL VENOUS BLD VENIPUNCTURE: CPT | Performed by: EMERGENCY MEDICINE

## 2023-06-04 PROCEDURE — 85025 COMPLETE CBC W/AUTO DIFF WBC: CPT | Performed by: EMERGENCY MEDICINE

## 2023-06-04 PROCEDURE — 99284 EMERGENCY DEPT VISIT MOD MDM: CPT

## 2023-06-04 PROCEDURE — 84484 ASSAY OF TROPONIN QUANT: CPT | Performed by: EMERGENCY MEDICINE

## 2023-06-04 PROCEDURE — 93005 ELECTROCARDIOGRAM TRACING: CPT

## 2023-06-04 PROCEDURE — 80048 BASIC METABOLIC PNL TOTAL CA: CPT | Performed by: EMERGENCY MEDICINE

## 2023-06-04 PROCEDURE — 85004 AUTOMATED DIFF WBC COUNT: CPT | Performed by: EMERGENCY MEDICINE

## 2023-06-04 PROCEDURE — 83735 ASSAY OF MAGNESIUM: CPT | Performed by: EMERGENCY MEDICINE

## 2023-06-04 ASSESSMENT — ACTIVITIES OF DAILY LIVING (ADL)
ADLS_ACUITY_SCORE: 35
ADLS_ACUITY_SCORE: 35

## 2023-06-04 NOTE — DISCHARGE INSTRUCTIONS
What do you do next:   Continue your home medications unless we have specifically changed them  Stay hydrated.   Depending on how your blood pressure continues to be, further reduction or limitation of your lisinopril may be required.  Follow up as indicated below    When do you return: If you have uncontrollable lightheadedness, focal numbness/weakness of your face/arms/legs, trouble walking or speaking, or any other symptoms that concern you, please return to the ED for reevaluation.    Thank you for allowing us to care for you today.

## 2023-06-04 NOTE — ED TRIAGE NOTES
A&O x4.  ABC's intact.      Pt arrives with c/o vision changes that started yesterday 3-5 minutes with objects moving, tightness in the jaw with left shoulder stiffness that has resolved, when taking BP last night and morning showed irregular heart beat which also resolved at 1020 today.     History of detached retina right eye, and A-Fib is on Eliquis.      Triage Assessment     Row Name 06/04/23 1146       Triage Assessment (Adult)    Airway WDL WDL       Respiratory WDL    Respiratory WDL WDL       Skin Circulation/Temperature WDL    Skin Circulation/Temperature WDL WDL       Cardiac WDL    Cardiac WDL WDL       Peripheral/Neurovascular WDL    Peripheral Neurovascular WDL WDL       Cognitive/Neuro/Behavioral WDL    Cognitive/Neuro/Behavioral WDL WDL

## 2023-06-04 NOTE — CONFIDENTIAL NOTE
I was called by patient regarding new symptoms yesterday including presyncope, visual changes that lasted 3 minutes, and jaw pain.    I instructed patient to present to the ED for evaluation for TIA/ ACS evaluation.

## 2023-06-04 NOTE — ED PROVIDER NOTES
"  History     Chief Complaint:  Multiple Issues       HPI   Sly Morton is a 72 year old male who presents to the ED due to lightheadedness.  The patient notes that yesterday he had, for his basement a couple of times (this is about 12-13 steps) and felt some degree of lightheadedness.  He states that typically he will work out and yesterday he did some weight training and cardio where he jogged in his house.  He has a home pulse oximeter and we looked at his heart rate it seemed increased from 60 to to 88-1 19 and 147 at its maximum though he states that he was moving during this and did not sit down.  He denies shortness of breath and after the work-up did not feel any lightheadedness and in fact states he \"felt great\". No chest pain or palpitations noted (he states that even when he was diagnosed with atrial fibrillation he did not perceive any palpitations)    Later, he was going to nail picture to the wall and as he was going up on the nail in he noted that the nail seemed to move or swim in his vision.  He does not recall specific lightheadedness at that point.  He then stood and turned to leave that room and noticed that his he was stepping over his grandkids toys his depth perception seemed \"off\".  This lasted anywhere from 30 seconds to perhaps 3 minutes at the most.  He was able to then go sit down at his desk.  He states that if he stared at 1 area and focused on or if he closed his eyes he felt okay but if he tried to track anything he had some similar swimming of the vision.  At 5 PM he took his blood pressure and sequential values are noted below:    Time BP HR   (6/3/23) 1700 74/46 105 \"irregular\"   1715 99/51    2000 102/60    2300 105/60    (6/4/23) 0715 88/57 \"irregular\"   1020 90/61      The patient states that today he did not feel any lightheadedness when he took his blood pressures at home.    He tried calling his cardiologist office and when he got a call back they suggested he come into " the emergency department based on his symptoms.  He states he feels fine now and has no vision issues right now.  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    He notes that he takes lisinopril, Lipitor, Eliquis, and gabapentin but no other rate limiting medications.    Independent Historian:    None - Patient Only    Review of External Notes:  Cardio office visit 12/16/22- Found be in asymptomatic A-fib in September 2022.  Subsequent Holter monitor showed that he was in atrial fibrillation a very small amount of the time.  There were also episodes of junctional rhythm and sinus bradycardia    ROS:  See HPI    Allergies:  Cats  No Known Allergies     Physical Exam     Patient Vitals for the past 24 hrs:   BP Temp Temp src Pulse Resp SpO2   06/04/23 1500 -- -- -- 58 16 100 %   06/04/23 1415 117/80 -- -- (!) 47 16 98 %   06/04/23 1143 100/79 98.7  F (37.1  C) Temporal 54 18 99 %        Physical Exam  Constitutional: Vital signs reviewed as above.   HENT:    Head: No external signs of trauma noted.   Eyes: Pupils are equal, round, and reactive to light.   Cardiovascular: Normal rate, regular rhythm, normal heart sounds and intact distal pulses.    Pulmonary/Chest: Effort normal and breath sounds normal. No respiratory distress. No wheezes noted.   Gastrointestinal: Soft. There is no tenderness. There is no rebound.   Musculoskeletal:   No deformities appreciated   No edema noted  Neurological:    Patient is alert and oriented to person, place, and time.    Speech is fluent, cognition is normal.   CN 2-12 intact (PERRL, EOMI, symmetric smile, equal eye squeeze and forehead raise, normal and equal sensation to bilateral forehead/cheek/chin, grossly equal hearing B/L, midline tongue protrusion with nl side-to-side movement, normal shoulder shrug).    RUE strength 5/5: , finger abd, wrist flex/ext, elbow flex/ext.    LUE  strength 5/5: , finger abd, wrist flex/ext, elbow flex/ext.    RLE strength 5/5: ankle flex/ext, knee flex/ext, hip flex.    LLE strength 5/5: ankle flex/ext, knee flex/ext, hip flex.    Sensation equal in all 4 extremities.    No arm drift.     Cerebellar: Normal rapid alternating movements     ( finger-nose-finger, rapid pronation/supination, hand rolling)    Normal heel-to-shin   Normal gait as observed in the ED  Skin: Skin is warm and dry.   Psychiatric: The patient appears calm        Emergency Department Course   ECG  ECG taken at 1224, ECG read at 1451  Sinus bradycardia with PACs with a Moore conduction  Left axis deviation  Septal infarct, age undetermined  Abnormal ECG    When compared with prior ECG dated 9/16/2022, the atrial fibrillation has resolved and is replaced with bradycardia and the above findings.  Rate 47 bpm. SC interval 194 ms. QRS duration 120 ms. QT/QTc 452/400 ms. P-R-T axes 44 -64 88.         Laboratory:  Labs Ordered and Resulted from Time of ED Arrival to Time of ED Departure   BASIC METABOLIC PANEL - Abnormal       Result Value    Sodium 135 (*)     Potassium 4.3      Chloride 104      Carbon Dioxide (CO2) 23      Anion Gap 8      Urea Nitrogen 26.9 (*)     Creatinine 0.99      Calcium 8.6 (*)     Glucose 137 (*)     GFR Estimate 82     CBC WITH PLATELETS AND DIFFERENTIAL - Abnormal    WBC Count 3.9 (*)     RBC Count 4.18 (*)     Hemoglobin 13.2 (*)     Hematocrit 39.6 (*)     MCV 95      MCH 31.6      MCHC 33.3      RDW 12.4      Platelet Count 191      % Neutrophils 61      % Lymphocytes 28      % Monocytes 9      % Eosinophils 1      % Basophils 1      % Immature Granulocytes 0      NRBCs per 100 WBC 0      Absolute Neutrophils 2.4      Absolute Lymphocytes 1.1      Absolute Monocytes 0.3      Absolute Eosinophils 0.0      Absolute Basophils 0.0      Absolute Immature Granulocytes 0.0      Absolute NRBCs 0.0     MAGNESIUM - Abnormal    Magnesium 2.4 (*)    TROPONIN T, HIGH  SENSITIVITY - Normal    Troponin T, High Sensitivity 18     TROPONIN T, HIGH SENSITIVITY - Normal    Troponin T, High Sensitivity 16     TROPONIN T, HIGH SENSITIVITY - Normal    Troponin T, High Sensitivity 16          Procedures       Emergency Department Course & Assessments:             Interventions:  Medications - No data to display     Assessments, Independent Interpretation, Consult/Discussion of ManagementTests:  ED Course as of 06/04/23 2138   Sun Jun 04, 2023   1724 Rechecked and updated.       Social Determinants of Health affecting care:  None    Disposition:  The patient was discharged to home.     Impression & Plan    CMS Diagnoses: None    Code Status: No Order    Medical Decision Making:    This 72 year old male presents to the ED due to Multiple Issues   . Please see the HPI and exam for specifics. A broad differential was considered including stroke, cardiac sources, arrhythmia, etc.    Based on the differential, exam, and any decision tools, the above workup was undertaken. Lab and imaging results were reviewed by me and are notable for slight hypocalcemia, hyperglycemia, hyponatremia.    The patient remained asymptomatic in the ER and no interventions were administered.    Based on this, I felt that the most likely etiology of their symptoms is possibly due to his bradycardia and/or atrial fibrillation or other cardiac (transient) abnormality at home.  I did not feel that advanced neuroimaging was required as I do not believe his symptoms represent a stroke.    I think that outpatient follow-up with his primary care clinic is reasonable.  Patient did have a couple of hypotensive blood pressures documented at home and it looks like he is already on the lowest dose of lisinopril.  I think that primary care and cardiology follow-up would be reasonable given his prior history to have further discussion on antihypertensive treatment and possible arrhythmias as etiologies of today's  visit.    Anticipatory guidance given to patient and wife prior to discharge.    Critical Care time:  was 0 minutes for this patient excluding procedures.    Diagnosis:    ICD-10-CM    1. Lightheadedness  R42       2. Vision changes  H53.9            Discharge Medications:  Discharge Medication List as of 6/4/2023  5:38 PM             6/4/2023   Romel Szymanski DO Burns, Bradley Joseph, DO  06/04/23 2138

## 2023-06-05 ENCOUNTER — TELEPHONE (OUTPATIENT)
Dept: CARDIOLOGY | Facility: CLINIC | Age: 73
End: 2023-06-05

## 2023-06-05 DIAGNOSIS — I48.0 PAF (PAROXYSMAL ATRIAL FIBRILLATION) (H): Primary | ICD-10-CM

## 2023-06-05 DIAGNOSIS — I25.10 CORONARY ARTERY DISEASE INVOLVING NATIVE CORONARY ARTERY OF NATIVE HEART WITHOUT ANGINA PECTORIS: ICD-10-CM

## 2023-06-05 LAB
ATRIAL RATE - MUSE: 47 BPM
DIASTOLIC BLOOD PRESSURE - MUSE: NORMAL MMHG
INTERPRETATION ECG - MUSE: NORMAL
P AXIS - MUSE: 44 DEGREES
PR INTERVAL - MUSE: 194 MS
QRS DURATION - MUSE: 120 MS
QT - MUSE: 452 MS
QTC - MUSE: 400 MS
R AXIS - MUSE: -64 DEGREES
SYSTOLIC BLOOD PRESSURE - MUSE: NORMAL MMHG
T AXIS - MUSE: 88 DEGREES
VENTRICULAR RATE- MUSE: 47 BPM

## 2023-06-05 NOTE — TELEPHONE ENCOUNTER
Hold placed on 6/12 with Teresa Sutton at 7:30 Am    Call placed to patient, no answer. Left .  Nicole FLANAGAN RN  06/05/23 at 10:44 AM

## 2023-06-05 NOTE — TELEPHONE ENCOUNTER
[facility] Call Center    Phone Message    May a detailed message be left on voicemail: yes     Reason for Call: Other:     Pt is requesting a al back to discuss ER visit testing over the weekend.  Discharge notes state cardio visit but earliest is Aug.      Action Taken: Other: cardio    Travel Screening: Not Applicable     Thank you!  [facility]

## 2023-06-05 NOTE — TELEPHONE ENCOUNTER
Patient calling to schedule ED follow-up appt. Spot open 6/12/23 with Teresa Sutton CNP. Message sent to scheduling to schedule appt.  Nicole FLANAGAN RN  06/05/23 at 12:59 PM

## 2023-06-11 NOTE — PROGRESS NOTES
HISTORY OF PRESENT ILLNESS:    This is a 72 year old male who follows with Dr Mensah at [facility]  His past medical history includes:  Coronary artery disease, ischemic cardiomyopathy, hypertension, paroxysmal atrial fibrillation    Mr Morton suffered an anterior MI and underwent stenting to his mid LAD (2004)  Subsequently, he received a stent to the OM in a staged fashion.  His LVEF then was 35-40% with 2-3+ MR    Stress NUC (2012) showed a small fixed anteroapical defect consistent with a nontransmural infarct, and there was inferior diaphragmatic attenuation.  LVEF 47%    ECHO (2019) LVEF 40-45% (global hypokinesis), mild RV dysfunction, moderate biatrial enlargement with mild MR     Stress ECHO (2021) showed anterior, septal, and apical infarct without ischemia  LVEF around 40% with mild MR/TR    He was incidentally found to be in A-fib with a controlled ventricular response (9/2022) and was eventually started on Eliquis.  Subsequent Holter showed mainly sinus bradycardia with average HR 54 bpm, ranging from  bpm. He also had episodes of junctional rhythm, consistent with some element of sick sinus syndrome. A-fib only 1% of time, short runs on nonsustained VT  Therefore, we have avoided AV whitney blocking agents.    Overall, his function capacity has been good and was free from any anginal symptoms when last seen in clinic 6 months ago.    Mr Morton was evaluated in the ED for lightheadedness, vision changes, and low blood pressures last week.  EKG showed sinus bradycardia at 47 bpm with PAC and left axis deviation  No new significant ST changes.  His blood pressure was initially low at home with some concern for irregularity in pulse  Labs fairly benign other than glucose 137  Imaging did not show concern for stroke.    Our visit today is for further review    Mr Morton is back to doing his usual daily cardiovascular exercise and has not had any further lightheadedness or vision changes.   His energy is good and he is able to do all his activities without limitations.  He assess his heart rate while exercising and states that his heart rate climbs to 140 bpm with his running.  He denies any chest pain, palpitations, orthopnea, or peripheral edema.  He sleeps well and does not think sleep apnea is an issue.      VITAL SIGNS:  BP:  132/80  Pulse:  48  Weight:  156 lbs      IMPRESSION AND PLAN:    Coronary Artery Disease:  -s/p anterior MI and stenting to mid LAD and OM in staged manner (2004)  -stress ECHO (2021) showed anterior infarct without ischemia  -No angina    Mild Sick Sinus Syndrome, mainly asymptomatic  -average HR 54 bpm with intermittent junctional rhythm  -avoiding AV whitney blocking agents  -will repeat Holter given recent events leading to ED visit  May refer to EP pending results    Asymptomatic Paroxysmal Atrial Fibrillation:  -low burden  -ED event possibly due to some A-fib initially at home, but EKG showed sinus rhythm   Will check Ziopatch  -on Eliquis  (CHADS2 Vasc score: 3)    Ischemic Cardiomyopathy:  -LVEF around 40%  -no signs or symptoms of heart failure  -repeat ECHO    Hyperlipidemia:  -on Atorvastatin 10 mg  -LDL  67    The total time for the visit today was 30 minutes which includes patient visit, reviewing of records, discussion, and placing of orders of the outpatient coordination of cardiovascular care as described.  The level of medical decision making during this visit was of moderate complexity.  Thank you for allowing me to participate in their care.      Orders Placed This Encounter   Procedures     Follow-Up with Cardiology SELENA     Leadless EKG Monitor 8 to 14 Days     Echocardiogram Complete       No orders of the defined types were placed in this encounter.      Medications Discontinued During This Encounter   Medication Reason     triamcinolone (KENALOG) 0.1 % external ointment      valACYclovir (VALTREX) 1000 mg tablet          Encounter Diagnoses   Name  Primary?     PAF (paroxysmal atrial fibrillation) (H)      Coronary artery disease involving native coronary artery of native heart without angina pectoris        CURRENT MEDICATIONS:  Current Outpatient Medications   Medication Sig Dispense Refill     apixaban ANTICOAGULANT (ELIQUIS ANTICOAGULANT) 5 MG tablet Take 1 tablet (5 mg) by mouth 2 times daily 180 tablet 4     atorvastatin (LIPITOR) 10 MG tablet Take 1 tablet (10 mg) by mouth daily 90 tablet 3     gabapentin (NEURONTIN) 300 MG capsule Take 300 mg by mouth At Bedtime       hydrocortisone (PROCTO-MED HC) 2.5 % cream apply to the anus twice daily as needed 30 g 0     latanoprost (XALATAN) 0.005 % ophthalmic solution Place 1 drop into the right eye At Bedtime   5     lisinopril (ZESTRIL) 2.5 MG tablet Take 1 tablet (2.5 mg) by mouth daily 90 tablet 3     nitroGLYcerin (NITROSTAT) 0.4 MG sublingual tablet For chest pain place 1 tablet under the tongue every 5 minutes for 3 doses. If symptoms persist 5 minutes after 1st dose call 911. 25 tablet 1     Psyllium (METAMUCIL FIBER PO) Take by mouth daily          ALLERGIES     Allergies   Allergen Reactions     Cats      No Known Allergies        PAST MEDICAL HISTORY:  Past Medical History:   Diagnosis Date     Cardiomyopathy, ischemic      Coronary artery disease      Family history of diabetes mellitus     Father     Hyperlipidaemia      Mitral valve disorders(424.0)     mitral regurgitation     Old myocardial infarction 5/04    anterior       PAST SURGICAL HISTORY:  Past Surgical History:   Procedure Laterality Date      KNEE SCOPE, DIAGNOSTIC  8/1/2011    Right knee arthroscopy with partial medial meniscectomy     HEART CATH, ANGIOPLASTY  May 2004     intracoronary stent placement of mid LAD May 2004,     HEART CATH, ANGIOPLASTY  June 2004    elective staged stent of OM      [facility] EYE EXAM ESTABLISHED PT  2003     [facility] REPAIR DETACH RETINA,SCLERAL BUCKLE  1967    Right eye       FAMILY HISTORY:  Family History  "  Problem Relation Age of Onset     Diabetes Father      C.A.D. Father      Cancer - colorectal No family hx of      Prostate Cancer No family hx of        SOCIAL HISTORY:  Social History     Socioeconomic History     Marital status:      Spouse name: Yoni     Number of children: 2     Years of education: 18     Highest education level: None   Occupational History     Occupation: Teacher     Employer: [employer]     Comment: retired   Tobacco Use     Smoking status: Never     Smokeless tobacco: Never   Substance and Sexual Activity     Alcohol use: No     Alcohol/week: 0.0 standard drinks of alcohol     Drug use: No     Sexual activity: Yes     Partners: Female   Other Topics Concern     Caffeine Concern Yes     Comment: 2-4 cups daily     Special Diet Yes     Comment: low fat, low cholesterol     Exercise Yes     Comment: 90 mins daily     Seat Belt Yes       Review of Systems:  Skin:          Eyes:         ENT:         Respiratory:  Negative       Cardiovascular:  Negative      Gastroenterology:        Genitourinary:         Musculoskeletal:         Neurologic:         Psychiatric:         Heme/Lymph/Imm:         Endocrine:           Physical Exam:  Vitals: /80 (BP Location: Right arm, Patient Position: Sitting, Cuff Size: Adult Regular)   Pulse (!) 48   Ht 1.753 m (5' 9\")   Wt 71.1 kg (156 lb 12.8 oz)   SpO2 98%   BMI 23.16 kg/m      Constitutional:  cooperative thin      Skin:  warm and dry to the touch          Head:  normocephalic        Eyes:  pupils equal and round;sclera white        Lymph:      ENT:  no pallor or cyanosis        Neck:  JVP normal        Respiratory:  clear to auscultation;normal respiratory excursion         Cardiac: regular rhythm;no murmurs, gallops or rubs detected;normal S1 and S2 bradycardic              pulses full and equal                                        GI:  BS normoactive        Extremities and Muscular Skeletal:  no edema          "     Neurological:  no gross motor deficits        Psych:  affect appropriate, oriented to time, person and place          CC  Delvin Dougherty, CECIL  [address] [place] [address]

## 2023-06-12 ENCOUNTER — OFFICE VISIT (OUTPATIENT)
Dept: CARDIOLOGY | Facility: CLINIC | Age: 73
End: 2023-06-12
Attending: PHYSICIAN ASSISTANT
Payer: COMMERCIAL

## 2023-06-12 VITALS
HEIGHT: 69 IN | DIASTOLIC BLOOD PRESSURE: 80 MMHG | SYSTOLIC BLOOD PRESSURE: 132 MMHG | OXYGEN SATURATION: 98 % | HEART RATE: 48 BPM | WEIGHT: 156.8 LBS | BODY MASS INDEX: 23.22 KG/M2

## 2023-06-12 DIAGNOSIS — I48.0 PAF (PAROXYSMAL ATRIAL FIBRILLATION) (H): ICD-10-CM

## 2023-06-12 DIAGNOSIS — I25.10 CORONARY ARTERY DISEASE INVOLVING NATIVE CORONARY ARTERY OF NATIVE HEART WITHOUT ANGINA PECTORIS: ICD-10-CM

## 2023-06-12 PROCEDURE — 99214 OFFICE O/P EST MOD 30 MIN: CPT | Performed by: NURSE PRACTITIONER

## 2023-06-12 NOTE — LETTER
6/12/2023    Eribreto Shetty MD  [address], [address]  [facility] [address]    RE: Sly FLANAGAN Praveen       Dear Colleague,     I had the pleasure of seeing Sly Morton in the [facility].  HISTORY OF PRESENT ILLNESS:    This is a 72 year old male who follows with Dr Mensah at [facility]  His past medical history includes:  Coronary artery disease, ischemic cardiomyopathy, hypertension, paroxysmal atrial fibrillation    Mr Morton suffered an anterior MI and underwent stenting to his mid LAD (2004)  Subsequently, he received a stent to the OM in a staged fashion.  His LVEF then was 35-40% with 2-3+ MR    Stress NUC (2012) showed a small fixed anteroapical defect consistent with a nontransmural infarct, and there was inferior diaphragmatic attenuation.  LVEF 47%    ECHO (2019) LVEF 40-45% (global hypokinesis), mild RV dysfunction, moderate biatrial enlargement with mild MR     Stress ECHO (2021) showed anterior, septal, and apical infarct without ischemia  LVEF around 40% with mild MR/TR    He was incidentally found to be in A-fib with a controlled ventricular response (9/2022) and was eventually started on Eliquis.  Subsequent Holter showed mainly sinus bradycardia with average HR 54 bpm, ranging from  bpm. He also had episodes of junctional rhythm, consistent with some element of sick sinus syndrome. A-fib only 1% of time, short runs on nonsustained VT  Therefore, we have avoided AV whitney blocking agents.    Overall, his function capacity has been good and was free from any anginal symptoms when last seen in clinic 6 months ago.    Mr Morton was evaluated in the ED for lightheadedness, vision changes, and low blood pressures last week.  EKG showed sinus bradycardia at 47 bpm with PAC and left axis deviation  No new significant ST changes.  His blood pressure was initially low at home with some concern for irregularity in pulse  Labs fairly benign other than glucose  137  Imaging did not show concern for stroke.    Our visit today is for further review    Mr Morton is back to doing his usual daily cardiovascular exercise and has not had any further lightheadedness or vision changes.  His energy is good and he is able to do all his activities without limitations.  He assess his heart rate while exercising and states that his heart rate climbs to 140 bpm with his running.  He denies any chest pain, palpitations, orthopnea, or peripheral edema.  He sleeps well and does not think sleep apnea is an issue.      VITAL SIGNS:  BP:  132/80  Pulse:  48  Weight:  156 lbs      IMPRESSION AND PLAN:    Coronary Artery Disease:  -s/p anterior MI and stenting to mid LAD and OM in staged manner (2004)  -stress ECHO (2021) showed anterior infarct without ischemia  -No angina    Mild Sick Sinus Syndrome, mainly asymptomatic  -average HR 54 bpm with intermittent junctional rhythm  -avoiding AV whitney blocking agents  -will repeat Holter given recent events leading to ED visit  May refer to EP pending results    Asymptomatic Paroxysmal Atrial Fibrillation:  -low burden  -ED event possibly due to some A-fib initially at home, but EKG showed sinus rhythm   Will check Ziopatch  -on Eliquis  (CHADS2 Vasc score: 3)    Ischemic Cardiomyopathy:  -LVEF around 40%  -no signs or symptoms of heart failure  -repeat ECHO    Hyperlipidemia:  -on Atorvastatin 10 mg  -LDL  67    The total time for the visit today was 30 minutes which includes patient visit, reviewing of records, discussion, and placing of orders of the outpatient coordination of cardiovascular care as described.  The level of medical decision making during this visit was of moderate complexity.  Thank you for allowing me to participate in their care.      Orders Placed This Encounter   Procedures    Follow-Up with Cardiology SELENA    Leadless EKG Monitor 8 to 14 Days    Echocardiogram Complete       No orders of the defined types were placed in this  encounter.      Medications Discontinued During This Encounter   Medication Reason    triamcinolone (KENALOG) 0.1 % external ointment     valACYclovir (VALTREX) 1000 mg tablet          Encounter Diagnoses   Name Primary?    PAF (paroxysmal atrial fibrillation) (H)     Coronary artery disease involving native coronary artery of native heart without angina pectoris        CURRENT MEDICATIONS:  Current Outpatient Medications   Medication Sig Dispense Refill    apixaban ANTICOAGULANT (ELIQUIS ANTICOAGULANT) 5 MG tablet Take 1 tablet (5 mg) by mouth 2 times daily 180 tablet 4    atorvastatin (LIPITOR) 10 MG tablet Take 1 tablet (10 mg) by mouth daily 90 tablet 3    gabapentin (NEURONTIN) 300 MG capsule Take 300 mg by mouth At Bedtime      hydrocortisone (PROCTO-MED HC) 2.5 % cream apply to the anus twice daily as needed 30 g 0    latanoprost (XALATAN) 0.005 % ophthalmic solution Place 1 drop into the right eye At Bedtime   5    lisinopril (ZESTRIL) 2.5 MG tablet Take 1 tablet (2.5 mg) by mouth daily 90 tablet 3    nitroGLYcerin (NITROSTAT) 0.4 MG sublingual tablet For chest pain place 1 tablet under the tongue every 5 minutes for 3 doses. If symptoms persist 5 minutes after 1st dose call 911. 25 tablet 1    Psyllium (METAMUCIL FIBER PO) Take by mouth daily          ALLERGIES     Allergies   Allergen Reactions    Cats     No Known Allergies        PAST MEDICAL HISTORY:  Past Medical History:   Diagnosis Date    Cardiomyopathy, ischemic     Coronary artery disease     Family history of diabetes mellitus     Father    Hyperlipidaemia     Mitral valve disorders(424.0)     mitral regurgitation    Old myocardial infarction 5/04    anterior       PAST SURGICAL HISTORY:  Past Surgical History:   Procedure Laterality Date     KNEE SCOPE, DIAGNOSTIC  8/1/2011    Right knee arthroscopy with partial medial meniscectomy    HEART CATH, ANGIOPLASTY  May 2004     intracoronary stent placement of mid LAD May 2004,    HEART CATH,  "ANGIOPLASTY  June 2004    elective staged stent of OM     [facility] EYE EXAM ESTABLISHED PT  2003    [facility] REPAIR DETACH RETINA,SCLERAL BUCKLE  1967    Right eye       FAMILY HISTORY:  Family History   Problem Relation Age of Onset    Diabetes Father     C.A.D. 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[employer] [address]     Comment: retired   Tobacco Use    Smoking status: Never    Smokeless tobacco: Never   Substance and Sexual Activity    Alcohol use: No     Alcohol/week: 0.0 standard drinks of alcohol    Drug use: No    Sexual activity: Yes     Partners: Female   Other Topics Concern    Caffeine Concern Yes     Comment: 2-4 cups daily    Special Diet Yes     Comment: low fat, low cholesterol    Exercise Yes     Comment: 90 mins daily    Seat Belt Yes       Review of Systems:  Skin:          Eyes:         ENT:         Respiratory:  Negative       Cardiovascular:  Negative      Gastroenterology:        Genitourinary:         Musculoskeletal:         Neurologic:         Psychiatric:         Heme/Lymph/Imm:         Endocrine:           Physical Exam:  Vitals: /80 (BP Location: Right arm, Patient Position: Sitting, Cuff Size: Adult Regular)   Pulse (!) 48   Ht 1.753 m (5' 9\")   Wt 71.1 kg (156 lb 12.8 oz)   SpO2 98%   BMI 23.16 kg/m      Constitutional:  cooperative thin      Skin:  warm and dry to the touch          Head:  normocephalic        Eyes:  pupils equal and round;sclera white        Lymph:      ENT:  no pallor or cyanosis        Neck:  JVP normal        Respiratory:  clear to auscultation;normal respiratory excursion         Cardiac: regular rhythm;no murmurs, gallops or rubs detected;normal S1 and S2 " bradycardic              pulses full and equal                                        GI:  BS normoactive        Extremities and Muscular Skeletal:  no edema              Neurological:  no gross motor deficits        Psych:  affect appropriate, oriented to time, person and place          CC  Delvin Dougherty, CECIL  [address] [place] [address]      Thank you for allowing me to participate in the care of your patient.      Sincerely,     BUBBA Pritchett [facility]

## 2023-06-12 NOTE — PATIENT INSTRUCTIONS
Arrange ECHO and ziopatch monitor  Return in 6 wks  Call with any further lightheadedness     It was a pleasure seeing you today     Please do not hesitate to call my nurse team with any questions or concerns:  [phone]    Scheduling number:  [phone]    BUBBA Irwin, CNP

## 2023-07-03 ENCOUNTER — HOSPITAL ENCOUNTER (OUTPATIENT)
Dept: CARDIOLOGY | Facility: CLINIC | Age: 73
Discharge: HOME | End: 2023-07-03
Attending: NURSE PRACTITIONER
Payer: COMMERCIAL

## 2023-07-03 DIAGNOSIS — I25.10 CORONARY ARTERY DISEASE INVOLVING NATIVE CORONARY ARTERY OF NATIVE HEART WITHOUT ANGINA PECTORIS: ICD-10-CM

## 2023-07-03 DIAGNOSIS — I48.0 PAF (PAROXYSMAL ATRIAL FIBRILLATION) (H): ICD-10-CM

## 2023-07-03 LAB — BI-PLANE LVEF ECHO: NORMAL

## 2023-07-03 PROCEDURE — 999N000208 ECHOCARDIOGRAM COMPLETE

## 2023-07-03 PROCEDURE — 255N000002 HC RX 255 OP 636: Performed by: NURSE PRACTITIONER

## 2023-07-03 PROCEDURE — 93306 TTE W/DOPPLER COMPLETE: CPT | Mod: 26 | Performed by: INTERNAL MEDICINE

## 2023-07-03 PROCEDURE — 93246 EXT ECG>7D<15D RECORDING: CPT

## 2023-07-03 RX ADMIN — HUMAN ALBUMIN MICROSPHERES AND PERFLUTREN 3 ML: 10; .22 INJECTION, SOLUTION INTRAVENOUS at 08:24

## 2023-07-05 NOTE — RESULT ENCOUNTER NOTE
Results and recommendations routed Via My Chart with call back information if needed.    ECHO reviewed LVEF 47% (similar)  no significant valvular pathology   Awaiting Zio results   Pls let him know ECHO is stable

## 2023-07-19 ENCOUNTER — TRANSFERRED RECORDS (OUTPATIENT)
Dept: HEALTH INFORMATION MANAGEMENT | Facility: CLINIC | Age: 73
End: 2023-07-19
Payer: COMMERCIAL

## 2023-07-30 NOTE — PROGRESS NOTES
HISTORY OF PRESENT ILLNESS:     This is a 72 year old male who follows with Dr Mensah at [facility]  His past medical history includes:  Coronary artery disease, ischemic cardiomyopathy, hypertension, paroxysmal atrial fibrillation     Mr Morton suffered an anterior MI and underwent stenting to his mid LAD (2004)  Subsequently, he received a stent to the OM in a staged fashion.  His LVEF then was 35-40% with 2-3+ MR     Stress NUC (2012) showed a small fixed anteroapical defect consistent with a nontransmural infarct, and there was inferior diaphragmatic attenuation.  LVEF 47%     ECHO (2019) LVEF 40-45% (global hypokinesis), mild RV dysfunction, moderate biatrial enlargement with mild MR      Stress ECHO (2021) showed anterior, septal, and apical infarct without ischemia  LVEF around 40% with mild MR/TR     He was incidentally found to be in A-fib with a controlled ventricular response (9/2022) and was eventually started on Eliquis.  Subsequent Holter showed mainly sinus bradycardia with average HR 54 bpm, ranging from  bpm. He also had episodes of junctional rhythm, consistent with some element of sick sinus syndrome. A-fib only 1% of time, short runs on nonsustained VT  Therefore, we have avoided AV whitney blocking agents.     Mr Morton was evaluated in the ED for lightheadedness, vision changes, and low blood pressures (6/2023)  EKG showed sinus bradycardia at 47 bpm with PAC and left axis deviation  No new significant ST changes.  His blood pressure was initially low at home with some concern for irregularity in pulse  Labs fairly benign other than glucose 137  Imaging did not show concern for stroke.  When seen in follow up last month, he was back to his usual activities without any further lightheadedness.  An ECHO and heart monitor were arranged.    ECHO (7/3/23) showed LVEF 47% (global hypokinesis), normal RV function, 1+ MR, Sinus rhythm at 45 bpm    Holter monitor (7/19/23) showed  sinus rhythm with average heart rate 52 bpm, down to 32 bpm during nighttime  One 4-beat run of nonsustained VT, 5 SVT runs, longest 10 beats  Rare ectopies  No A-fib     Our visit today is for further review    Mr Morton is active without any limitations and his energy is good. He denies any chest pain, shortness of breath, palpitations, orthopnea, or peripheral edema.  He has not noted any significant bleeding issues          VITAL SIGNS:  BP:  124/68  Pulse:  52  Weight:  156 lbs (stable)        IMPRESSION AND PLAN:     Coronary Artery Disease:  -s/p anterior MI and stenting to mid LAD and OM in staged manner (2004)  -stress ECHO (2021) showed anterior infarct without ischemia  -No angina     Mild Sick Sinus Syndrome, mainly asymptomatic  -average HR 54 bpm with intermittent junctional rhythm  -avoiding AV whitney blocking agents  -denies any further lightheadedness/near-syncope     Asymptomatic Paroxysmal Atrial Fibrillation:  -none on recent 24-hour holter, previously low burden  -on Eliquis  (CHADS2 Vasc score: 3)     Ischemic Cardiomyopathy:  -LVEF around 47%  -no signs or symptoms of heart failure  -on low dose ACE-I       Hyperlipidemia:  -on Atorvastatin 10 mg  -LDL  67  -consider increasing dose pending upcoming lipid panel    The total time for the visit today was 24 minutes which includes patient visit, reviewing of records, discussion, and placing of orders of the outpatient coordination of cardiovascular care as described.  The level of medical decision making during this visit was of mild-moderate complexity.  Thank you for allowing me to participate in their care.     No orders of the defined types were placed in this encounter.      No orders of the defined types were placed in this encounter.      There are no discontinued medications.      Encounter Diagnosis   Name Primary?    PAF (paroxysmal atrial fibrillation) (H)        CURRENT MEDICATIONS:  Current Outpatient Medications   Medication Sig  Dispense Refill    apixaban ANTICOAGULANT (ELIQUIS ANTICOAGULANT) 5 MG tablet Take 1 tablet (5 mg) by mouth 2 times daily 180 tablet 4    atorvastatin (LIPITOR) 10 MG tablet Take 1 tablet (10 mg) by mouth daily 90 tablet 3    gabapentin (NEURONTIN) 300 MG capsule Take 300 mg by mouth At Bedtime      latanoprost (XALATAN) 0.005 % ophthalmic solution Place 1 drop into the right eye At Bedtime   5    lisinopril (ZESTRIL) 2.5 MG tablet Take 1 tablet (2.5 mg) by mouth daily 90 tablet 3    nitroGLYcerin (NITROSTAT) 0.4 MG sublingual tablet For chest pain place 1 tablet under the tongue every 5 minutes for 3 doses. If symptoms persist 5 minutes after 1st dose call 911. 25 tablet 1    Psyllium (METAMUCIL FIBER PO) Take by mouth daily       hydrocortisone (PROCTO-MED HC) 2.5 % cream apply to the anus twice daily as needed (Patient not taking: Reported on 8/1/2023) 30 g 0       ALLERGIES     Allergies   Allergen Reactions    Cats     No Known Allergies        PAST MEDICAL HISTORY:  Past Medical History:   Diagnosis Date    Cardiomyopathy, ischemic     Coronary artery disease     Family history of diabetes mellitus     Father    Hyperlipidaemia     Mitral valve disorders(424.0)     mitral regurgitation    Old myocardial infarction 5/04    anterior       PAST SURGICAL HISTORY:  Past Surgical History:   Procedure Laterality Date     KNEE SCOPE, DIAGNOSTIC  8/1/2011    Right knee arthroscopy with partial medial meniscectomy    HEART CATH, ANGIOPLASTY  May 2004     intracoronary stent placement of mid LAD May 2004,    HEART CATH, ANGIOPLASTY  June 2004    elective staged stent of OM     [facility] EYE EXAM ESTABLISHED PT  2003    [facility] REPAIR DETACH RETINA,SCLERAL BUCKLE  1967    Right eye       FAMILY HISTORY:  Family History   Problem Relation Age of Onset    Diabetes Father     C.A.D. Father     Cancer - colorectal No family hx of     Prostate Cancer No family hx of        SOCIAL HISTORY:  Social History     Socioeconomic History     Marital status:      Spouse name: Yoni    Number of children: 2    Years of education: 18    Highest education level: None   Occupational History    Occupation: Teacher     Employer: [employer]     Comment: retired   Tobacco Use    Smoking status: Never    Smokeless tobacco: Never   Substance and Sexual Activity    Alcohol use: No     Alcohol/week: 0.0 standard drinks of alcohol    Drug use: No    Sexual activity: Yes     Partners: Female   Other Topics Concern    Caffeine Concern Yes     Comment: 2-4 cups daily    Special Diet Yes     Comment: low fat, low cholesterol    Exercise Yes     Comment: 90 mins daily    Seat Belt Yes       Review of Systems:  Skin:  not assessed       Eyes:  not assessed      ENT:  not assessed      Respiratory:  Negative       Cardiovascular:  Negative      Gastroenterology: not assessed      Genitourinary:  not assessed      Musculoskeletal:  not assessed      Neurologic:  not assessed      Psychiatric:  not assessed      Heme/Lymph/Imm:  not assessed      Endocrine:  not assessed        Physical Exam:  Vitals: /68   Pulse 52   Wt 71 kg (156 lb 8 oz)   BMI 23.11 kg/m      Constitutional:  cooperative thin      Skin:  warm and dry to the touch          Head:  normocephalic        Eyes:  pupils equal and round;sclera white        Lymph:      ENT:  no pallor or cyanosis        Neck:  JVP normal        Respiratory:  clear to auscultation;normal respiratory excursion         Cardiac: regular rhythm;no murmurs, gallops or rubs detected;normal S1 and S2 bradycardic              pulses full and equal                                        GI:  BS normoactive        Extremities and Muscular Skeletal:  no edema              Neurological:  no gross motor deficits        Psych:  affect appropriate, oriented to time, person and place          BUBBA Weiner CNP  [address]  [place] [id number]

## 2023-08-01 ENCOUNTER — OFFICE VISIT (OUTPATIENT)
Dept: CARDIOLOGY | Facility: CLINIC | Age: 73
End: 2023-08-01
Attending: NURSE PRACTITIONER
Payer: COMMERCIAL

## 2023-08-01 VITALS
SYSTOLIC BLOOD PRESSURE: 124 MMHG | BODY MASS INDEX: 23.11 KG/M2 | HEART RATE: 52 BPM | DIASTOLIC BLOOD PRESSURE: 68 MMHG | WEIGHT: 156.5 LBS

## 2023-08-01 DIAGNOSIS — I48.0 PAF (PAROXYSMAL ATRIAL FIBRILLATION) (H): ICD-10-CM

## 2023-08-01 PROCEDURE — 99214 OFFICE O/P EST MOD 30 MIN: CPT | Performed by: NURSE PRACTITIONER

## 2023-08-01 NOTE — LETTER
8/1/2023    Eriberto Shetty MD  [address], [id number]  [facility] [address]    RE: Slyleni Morton       Dear Colleague,     I had the pleasure of seeing Sly Morton in the [facility].  HISTORY OF PRESENT ILLNESS:     This is a 72 year old male who follows with Dr Mensah at [facility]  His past medical history includes:  Coronary artery disease, ischemic cardiomyopathy, hypertension, paroxysmal atrial fibrillation     Mr Morton suffered an anterior MI and underwent stenting to his mid LAD (2004)  Subsequently, he received a stent to the OM in a staged fashion.  His LVEF then was 35-40% with 2-3+ MR     Stress NUC (2012) showed a small fixed anteroapical defect consistent with a nontransmural infarct, and there was inferior diaphragmatic attenuation.  LVEF 47%     ECHO (2019) LVEF 40-45% (global hypokinesis), mild RV dysfunction, moderate biatrial enlargement with mild MR      Stress ECHO (2021) showed anterior, septal, and apical infarct without ischemia  LVEF around 40% with mild MR/TR     He was incidentally found to be in A-fib with a controlled ventricular response (9/2022) and was eventually started on Eliquis.  Subsequent Holter showed mainly sinus bradycardia with average HR 54 bpm, ranging from  bpm. He also had episodes of junctional rhythm, consistent with some element of sick sinus syndrome. A-fib only 1% of time, short runs on nonsustained VT  Therefore, we have avoided AV whitney blocking agents.     Mr Morton was evaluated in the ED for lightheadedness, vision changes, and low blood pressures (6/2023)  EKG showed sinus bradycardia at 47 bpm with PAC and left axis deviation  No new significant ST changes.  His blood pressure was initially low at home with some concern for irregularity in pulse  Labs fairly benign other than glucose 137  Imaging did not show concern for stroke.  When seen in follow up last month, he was back to his usual activities  without any further lightheadedness.  An ECHO and heart monitor were arranged.    ECHO (7/3/23) showed LVEF 47% (global hypokinesis), normal RV function, 1+ MR, Sinus rhythm at 45 bpm    Holter monitor (7/19/23) showed sinus rhythm with average heart rate 52 bpm, down to 32 bpm during nighttime  One 4-beat run of nonsustained VT, 5 SVT runs, longest 10 beats  Rare ectopies  No A-fib     Our visit today is for further review    Mr Morton is active without any limitations and his energy is good. He denies any chest pain, shortness of breath, palpitations, orthopnea, or peripheral edema.  He has not noted any significant bleeding issues          VITAL SIGNS:  BP:  124/68  Pulse:  52  Weight:  156 lbs (stable)        IMPRESSION AND PLAN:     Coronary Artery Disease:  -s/p anterior MI and stenting to mid LAD and OM in staged manner (2004)  -stress ECHO (2021) showed anterior infarct without ischemia  -No angina     Mild Sick Sinus Syndrome, mainly asymptomatic  -average HR 54 bpm with intermittent junctional rhythm  -avoiding AV whitney blocking agents  -denies any further lightheadedness/near-syncope     Asymptomatic Paroxysmal Atrial Fibrillation:  -none on recent 24-hour holter, previously low burden  -on Eliquis  (CHADS2 Vasc score: 3)     Ischemic Cardiomyopathy:  -LVEF around 47%  -no signs or symptoms of heart failure  -on low dose ACE-I       Hyperlipidemia:  -on Atorvastatin 10 mg  -LDL  67  -consider increasing dose pending upcoming lipid panel    The total time for the visit today was 24 minutes which includes patient visit, reviewing of records, discussion, and placing of orders of the outpatient coordination of cardiovascular care as described.  The level of medical decision making during this visit was of mild-moderate complexity.  Thank you for allowing me to participate in their care.     No orders of the defined types were placed in this encounter.      No orders of the defined types were placed in this  encounter.      There are no discontinued medications.      Encounter Diagnosis   Name Primary?    PAF (paroxysmal atrial fibrillation) (H)        CURRENT MEDICATIONS:  Current Outpatient Medications   Medication Sig Dispense Refill    apixaban ANTICOAGULANT (ELIQUIS ANTICOAGULANT) 5 MG tablet Take 1 tablet (5 mg) by mouth 2 times daily 180 tablet 4    atorvastatin (LIPITOR) 10 MG tablet Take 1 tablet (10 mg) by mouth daily 90 tablet 3    gabapentin (NEURONTIN) 300 MG capsule Take 300 mg by mouth At Bedtime      latanoprost (XALATAN) 0.005 % ophthalmic solution Place 1 drop into the right eye At Bedtime   5    lisinopril (ZESTRIL) 2.5 MG tablet Take 1 tablet (2.5 mg) by mouth daily 90 tablet 3    nitroGLYcerin (NITROSTAT) 0.4 MG sublingual tablet For chest pain place 1 tablet under the tongue every 5 minutes for 3 doses. If symptoms persist 5 minutes after 1st dose call 911. 25 tablet 1    Psyllium (METAMUCIL FIBER PO) Take by mouth daily       hydrocortisone (PROCTO-MED HC) 2.5 % cream apply to the anus twice daily as needed (Patient not taking: Reported on 8/1/2023) 30 g 0       ALLERGIES     Allergies   Allergen Reactions    Cats     No Known Allergies        PAST MEDICAL HISTORY:  Past Medical History:   Diagnosis Date    Cardiomyopathy, ischemic     Coronary artery disease     Family history of diabetes mellitus     Father    Hyperlipidaemia     Mitral valve disorders(424.0)     mitral regurgitation    Old myocardial infarction 5/04    anterior       PAST SURGICAL HISTORY:  Past Surgical History:   Procedure Laterality Date     KNEE SCOPE, DIAGNOSTIC  8/1/2011    Right knee arthroscopy with partial medial meniscectomy    HEART CATH, ANGIOPLASTY  May 2004     intracoronary stent placement of mid LAD May 2004,    HEART CATH, ANGIOPLASTY  June 2004    elective staged stent of OM     [facility] EYE EXAM ESTABLISHED PT  2003    [facility] REPAIR DETACH RETINA,SCLERAL BUCKLE  1967    Right eye       FAMILY HISTORY:  Family  History   Problem Relation Age of Onset    Diabetes Father     C.A.D. 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[employer]     Comment: retired   Tobacco Use    Smoking status: Never    Smokeless tobacco: Never   Substance and Sexual Activity    Alcohol use: No     Alcohol/week: 0.0 standard drinks of alcohol    Drug use: No    Sexual activity: Yes     Partners: Female   Other Topics Concern    Caffeine Concern Yes     Comment: 2-4 cups daily    Special Diet Yes     Comment: low fat, low cholesterol    Exercise Yes     Comment: 90 mins daily    Seat Belt Yes       Review of Systems:  Skin:  not assessed       Eyes:  not assessed      ENT:  not assessed      Respiratory:  Negative       Cardiovascular:  Negative      Gastroenterology: not assessed      Genitourinary:  not assessed      Musculoskeletal:  not assessed      Neurologic:  not assessed      Psychiatric:  not assessed      Heme/Lymph/Imm:  not assessed      Endocrine:  not assessed        Physical Exam:  Vitals: /68   Pulse 52   Wt 71 kg (156 lb 8 oz)   BMI 23.11 kg/m      Constitutional:  cooperative thin      Skin:  warm and dry to the touch          Head:  normocephalic        Eyes:  pupils equal and round;sclera white        Lymph:      ENT:  no pallor or cyanosis        Neck:  JVP normal        Respiratory:  clear to auscultation;normal respiratory excursion         Cardiac: regular rhythm;no murmurs, gallops or rubs detected;normal S1 and S2 bradycardic              pulses full and equal                                        GI:  BS normoactive        Extremities and Muscular Skeletal:  no edema              Neurological:  no gross  motor deficits        Psych:  affect appropriate, oriented to time, person and place          CC  BUBBA White CNP  [address]  [place] [id number]      Thank you for allowing me to participate in the care of your patient.      Sincerely,     BUBBA Pritchett CNP     [facility]

## 2023-09-21 ENCOUNTER — MYC MEDICAL ADVICE (OUTPATIENT)
Dept: CARDIOLOGY | Facility: CLINIC | Age: 73
End: 2023-09-21
Payer: COMMERCIAL

## 2023-09-21 DIAGNOSIS — I25.10 CORONARY ARTERY DISEASE INVOLVING NATIVE CORONARY ARTERY OF NATIVE HEART WITHOUT ANGINA PECTORIS: Primary | Chronic | ICD-10-CM

## 2023-09-26 NOTE — TELEPHONE ENCOUNTER
Royal Winst message not read by patient. Called patient to remind him to fast prior to labs tomorrow, message left.

## 2023-09-27 ENCOUNTER — OFFICE VISIT (OUTPATIENT)
Dept: CARDIOLOGY | Facility: CLINIC | Age: 73
End: 2023-09-27
Attending: INTERNAL MEDICINE
Payer: COMMERCIAL

## 2023-09-27 ENCOUNTER — LAB (OUTPATIENT)
Dept: LAB | Facility: CLINIC | Age: 73
End: 2023-09-27
Payer: COMMERCIAL

## 2023-09-27 ENCOUNTER — TELEPHONE (OUTPATIENT)
Dept: CARDIOLOGY | Facility: CLINIC | Age: 73
End: 2023-09-27

## 2023-09-27 VITALS
OXYGEN SATURATION: 97 % | HEART RATE: 43 BPM | DIASTOLIC BLOOD PRESSURE: 64 MMHG | SYSTOLIC BLOOD PRESSURE: 126 MMHG | BODY MASS INDEX: 23.4 KG/M2 | WEIGHT: 158 LBS | HEIGHT: 69 IN

## 2023-09-27 DIAGNOSIS — I48.0 PAF (PAROXYSMAL ATRIAL FIBRILLATION) (H): ICD-10-CM

## 2023-09-27 DIAGNOSIS — I25.5 CARDIOMYOPATHY, ISCHEMIC: ICD-10-CM

## 2023-09-27 DIAGNOSIS — I25.10 CORONARY ARTERY DISEASE INVOLVING NATIVE CORONARY ARTERY OF NATIVE HEART WITHOUT ANGINA PECTORIS: Primary | Chronic | ICD-10-CM

## 2023-09-27 DIAGNOSIS — I25.10 CORONARY ARTERY DISEASE INVOLVING NATIVE CORONARY ARTERY OF NATIVE HEART WITHOUT ANGINA PECTORIS: Chronic | ICD-10-CM

## 2023-09-27 DIAGNOSIS — E78.00 PURE HYPERCHOLESTEROLEMIA: ICD-10-CM

## 2023-09-27 DIAGNOSIS — I34.0 NON-RHEUMATIC MITRAL REGURGITATION: ICD-10-CM

## 2023-09-27 DIAGNOSIS — I49.5 SSS (SICK SINUS SYNDROME) (H): ICD-10-CM

## 2023-09-27 LAB
ALT SERPL W P-5'-P-CCNC: 16 U/L (ref 0–70)
CHOLEST SERPL-MCNC: 134 MG/DL
HDLC SERPL-MCNC: 62 MG/DL
HGB BLD-MCNC: 13.3 G/DL (ref 13.3–17.7)
LDLC SERPL CALC-MCNC: 64 MG/DL
NONHDLC SERPL-MCNC: 72 MG/DL
TRIGL SERPL-MCNC: 40 MG/DL

## 2023-09-27 PROCEDURE — 36415 COLL VENOUS BLD VENIPUNCTURE: CPT | Performed by: INTERNAL MEDICINE

## 2023-09-27 PROCEDURE — 85018 HEMOGLOBIN: CPT | Performed by: PHYSICIAN ASSISTANT

## 2023-09-27 PROCEDURE — 99215 OFFICE O/P EST HI 40 MIN: CPT | Performed by: INTERNAL MEDICINE

## 2023-09-27 PROCEDURE — 80061 LIPID PANEL: CPT | Performed by: INTERNAL MEDICINE

## 2023-09-27 PROCEDURE — 84460 ALANINE AMINO (ALT) (SGPT): CPT | Performed by: INTERNAL MEDICINE

## 2023-09-27 RX ORDER — ATORVASTATIN CALCIUM 20 MG/1
20 TABLET, FILM COATED ORAL DAILY
Qty: 90 TABLET | Refills: 4 | Status: SHIPPED | OUTPATIENT
Start: 2023-09-27

## 2023-09-27 NOTE — TELEPHONE ENCOUNTER
----- Message from Jayde Hendrickson sent at 9/27/2023 12:10 PM CDT -----  Regarding: Labs done today  Hi you guys, when pt left today after seeing Dr Mensah he asked if someone would send out his labs to him?   Thank you so much! Jayde

## 2023-09-27 NOTE — PROGRESS NOTES
HPI and Plan:    Mr. Morton is a very nice 73-year-old gentleman who is the model cardiac patient.  I met him in 2004 when he presented with a large anterior wall myocardial infarction, we stented his mid left anterior descending artery.  In a staged fashion, we stented his obtuse marginal.  Initial ejection fraction was 35%-40% with 2 to 3+ mitral regurgitation.  More recent echocardiograms have been in the 40%-50% range with mitral regurgitation now down to trace to mild.    In 2022 he was diagnosed with paroxysmal A-fib on Eliquis and sick sinus syndrome with significant bradycardia.  An ER visit in June 2023 for near syncope found hypotension and bradycardia.  Subsequent evaluation included an echocardiogram with ejection fraction of 47% 1+ mitral regurgitation and sinus rhythm at 45 bpm.  A 10-day Zio patch showed sinus rhythm with an average heart rate of 52 going down as low as 32 during the nighttime.  He had 1 run of nonsustained ventricular tachycardia 5 runs of SVT longest being 10 beats and no A-fib.     His most recent stress test was a stress echocardiogram a year ago 09/2021, at which time he was able to exercise 10 minutes of standard Herminio protocol without symptoms, EKG changes.  He had a baseline abnormal wall motion abnormality without any stress-induced wall motion abnormality.  Baseline ejection fraction was estimated to be 45%-50%, consistent with an old anterior wall myocardial infarction without significant ischemia.    He returns to clinic stating he thinks he is doing great.  He runs 7 days a week.  On nice days he may go for a 1 hour bike ride instead.  3 days a week he lifts weights and the other 4 days does core exercises.  He notes no problems or symptoms with any of this.      He has occasional episodes of orthostasis but otherwise no further episodes of lightheadedness and dizziness.      He notes no side effects or problems with his current medical regiment.      ASSESSMENT AND PLAN:   Dustin appears to be doing quite well from a cardiac standpoint.  He does not appear to be having any ischemia and this is supported by his stress test of 2021     He does have an ischemic cardiomyopathy, but clearly shows no signs of heart failure.  Recent echocardiogram shows no deterioration of his LV function or mitral regurgitation.     Given the fact that his sick sinus syndrome and for the most part is asymptomatic, talked about his bradycardia.  I told him I suspect some that he will need a pacemaker for chronotropic incompetence.  At this time he is functioning at a very high level.  Obviously we cannot use a beta-blocker.  Thyroid function tests were normal in 2022     He is tolerating his Eliquis quite well.  He states fortunately its not significantly expensive and he is able to afford it.     I congratulated him on his healthy lifestyle.  I encouraged him to continue to do so.     His fasting lipid profile is good on only 10 mg of atorvastatin.  However I told him they moved the goalpost.  His LDL of 67 is no longer adequate and I would like to aim for less than 55.  To this end I am going to increase his atorvastatin to 20 mg daily.  We will recheck a fasting lipid profile and ALT in 1 month with a goal LDL less than 55.  I told him we may have to bump his atorvastatin up again.     If he has significant orthostasis I may discontinue his 2.5 of lisinopril.  At this time his blood pressure is very well controlled.  It is beneficial in light of his ischemic cardiomyopathy and mitral regurgitation.     I will have him follow up in a couple of months with my SELENA given his new onset AFib to see how he is doing, otherwise, I will plan on seeing him back in 1 year.     Thank you for allowing me to participate in his care.     David Mensah MD, [facility]    Today's clinic visit entailed:  Review of external notes as documented elsewhere in note  Review of the result(s) of each unique test - echocardiogram,  event monitor, lab work  Ordering of each unique test  Prescription drug management  45 minutes spent by me on the date of the encounter doing chart review, history and exam, documentation and further activities per the note  Provider  Link to MDM Help Grid     The level of medical decision making during this visit was of moderate complexity.      Orders Placed This Encounter   Procedures    Lipid Profile    ALT    Basic metabolic panel    Lipid Profile    ALT    Follow-Up with Cardiology       Orders Placed This Encounter   Medications    atorvastatin (LIPITOR) 20 MG tablet     Sig: Take 1 tablet (20 mg) by mouth daily     Dispense:  90 tablet     Refill:  4       Medications Discontinued During This Encounter   Medication Reason    atorvastatin (LIPITOR) 10 MG tablet Reorder (No AVS)         Encounter Diagnoses   Name Primary?    Coronary artery disease involving native coronary artery of native heart without angina pectoris Yes    Cardiomyopathy, ischemic     Non-rheumatic mitral regurgitation     Pure hypercholesterolemia     PAF (paroxysmal atrial fibrillation) (H)     SSS (sick sinus syndrome) (H)        CURRENT MEDICATIONS:  Current Outpatient Medications   Medication Sig Dispense Refill    apixaban ANTICOAGULANT (ELIQUIS ANTICOAGULANT) 5 MG tablet Take 1 tablet (5 mg) by mouth 2 times daily 180 tablet 4    atorvastatin (LIPITOR) 20 MG tablet Take 1 tablet (20 mg) by mouth daily 90 tablet 4    gabapentin (NEURONTIN) 300 MG capsule Take 300 mg by mouth At Bedtime      latanoprost (XALATAN) 0.005 % ophthalmic solution Place 1 drop into the right eye At Bedtime   5    lisinopril (ZESTRIL) 2.5 MG tablet Take 1 tablet (2.5 mg) by mouth daily 90 tablet 3    nitroGLYcerin (NITROSTAT) 0.4 MG sublingual tablet For chest pain place 1 tablet under the tongue every 5 minutes for 3 doses. If symptoms persist 5 minutes after 1st dose call 911. 25 tablet 1    Psyllium (METAMUCIL FIBER PO) Take by mouth daily        hydrocortisone (PROCTO-MED HC) 2.5 % cream apply to the anus twice daily as needed (Patient not taking: Reported on 9/27/2023) 30 g 0       ALLERGIES     Allergies   Allergen Reactions    Cats     No Known Allergies        PAST MEDICAL HISTORY:  Past Medical History:   Diagnosis Date    Cardiomyopathy, ischemic     Coronary artery disease     Family history of diabetes mellitus     Father    Hyperlipidaemia     Mitral valve disorders(424.0)     mitral regurgitation    Old myocardial infarction 5/04    anterior       PAST SURGICAL HISTORY:  Past Surgical History:   Procedure Laterality Date    HC KNEE SCOPE, DIAGNOSTIC  8/1/2011    Right knee arthroscopy with partial medial meniscectomy    HEART CATH, ANGIOPLASTY  May 2004     intracoronary stent placement of mid LAD May 2004,    HEART CATH, ANGIOPLASTY  June 2004    elective staged stent of OM     [facility] EYE EXAM ESTABLISHED PT  2003    [facility] REPAIR DETACH RETINA,SCLERAL BUCKLE  1967    Right eye       FAMILY HISTORY:  Family History   Problem Relation Age of Onset    Diabetes Father     C.A.D. 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[employer]     Comment: retired   Tobacco Use    Smoking status: Never    Smokeless tobacco: Never   Substance and Sexual Activity    Alcohol use: No     Alcohol/week: 0.0 standard drinks of alcohol    Drug use: No    Sexual activity: Yes     Partners: Female   Other Topics Concern    Caffeine Concern Yes     Comment: 2-4 cups daily    Special Diet Yes     Comment: low fat, low cholesterol    Exercise Yes     Comment: 90 mins daily    Seat Belt Yes       Review of Systems:  Skin:          Eyes:         ENT:         Respiratory:   "Negative       Cardiovascular:    Positive for;lightheadedness occ LH going up stairs, feels the best when working out  Gastroenterology:        Genitourinary:         Musculoskeletal:         Neurologic:         Psychiatric:         Heme/Lymph/Imm:         Endocrine:           Physical Exam:  Vitals: /64 (BP Location: Right arm, Patient Position: Sitting, Cuff Size: Adult Regular)   Pulse (!) 43   Ht 1.753 m (5' 9\")   Wt 71.7 kg (158 lb)   SpO2 97%   BMI 23.33 kg/m      Constitutional:  cooperative, alert and oriented, well developed, well nourished, in no acute distress thin      Skin:  warm and dry to the touch, no apparent skin lesions or masses noted          Head:  normocephalic, no masses or lesions        Eyes:  pupils equal and round, conjunctivae and lids unremarkable, sclera white, no xanthalasma, EOMS intact, no nystagmus        Lymph:      ENT:  no pallor or cyanosis, dentition good        Neck:  carotid pulses are full and equal bilaterally;no carotid bruit        Respiratory:  normal breath sounds, clear to auscultation, normal A-P diameter, normal symmetry, normal respiratory excursion, no use of accessory muscles         Cardiac: regular rhythm;no murmurs, gallops or rubs detected;normal S1 and S2                pulses full and equal                                        GI:           Extremities and Muscular Skeletal:  no edema;no spinal abnormalities noted;normal muscle strength and tone              Neurological:  no gross motor deficits        Psych:  affect appropriate, oriented to time, person and place        CC  David Mensah MD  [address]  [place] [id number]                "

## 2023-09-27 NOTE — LETTER
9/27/2023    Eriberto Shetty MD  [address], [address]  [facility] [address]    RE: Sly Morton       Dear Colleague,     I had the pleasure of seeing Sly Morton in the [facility].  HPI and Plan:    Mr. Morton is a very nice 73-year-old gentleman who is the model cardiac patient.  I met him in 2004 when he presented with a large anterior wall myocardial infarction, we stented his mid left anterior descending artery.  In a staged fashion, we stented his obtuse marginal.  Initial ejection fraction was 35%-40% with 2 to 3+ mitral regurgitation.  More recent echocardiograms have been in the 40%-50% range with mitral regurgitation now down to trace to mild.    In 2022 he was diagnosed with paroxysmal A-fib on Eliquis and sick sinus syndrome with significant bradycardia.  An ER visit in June 2023 for near syncope found hypotension and bradycardia.  Subsequent evaluation included an echocardiogram with ejection fraction of 47% 1+ mitral regurgitation and sinus rhythm at 45 bpm.  A 10-day Zio patch showed sinus rhythm with an average heart rate of 52 going down as low as 32 during the nighttime.  He had 1 run of nonsustained ventricular tachycardia 5 runs of SVT longest being 10 beats and no A-fib.     His most recent stress test was a stress echocardiogram a year ago 09/2021, at which time he was able to exercise 10 minutes of standard Herminio protocol without symptoms, EKG changes.  He had a baseline abnormal wall motion abnormality without any stress-induced wall motion abnormality.  Baseline ejection fraction was estimated to be 45%-50%, consistent with an old anterior wall myocardial infarction without significant ischemia.    He returns to clinic stating he thinks he is doing great.  He runs 7 days a week.  On nice days he may go for a 1 hour bike ride instead.  3 days a week he lifts weights and the other 4 days does core exercises.  He notes no problems or symptoms with any of  this.      He has occasional episodes of orthostasis but otherwise no further episodes of lightheadedness and dizziness.      He notes no side effects or problems with his current medical regiment.      ASSESSMENT AND PLAN:  Dustin appears to be doing quite well from a cardiac standpoint.  He does not appear to be having any ischemia and this is supported by his stress test of 2021     He does have an ischemic cardiomyopathy, but clearly shows no signs of heart failure.  Recent echocardiogram shows no deterioration of his LV function or mitral regurgitation.     Given the fact that his sick sinus syndrome and for the most part is asymptomatic, talked about his bradycardia.  I told him I suspect some that he will need a pacemaker for chronotropic incompetence.  At this time he is functioning at a very high level.  Obviously we cannot use a beta-blocker.  Thyroid function tests were normal in 2022     He is tolerating his Eliquis quite well.  He states fortunately its not significantly expensive and he is able to afford it.     I congratulated him on his healthy lifestyle.  I encouraged him to continue to do so.     His fasting lipid profile is good on only 10 mg of atorvastatin.  However I told him they moved the goalpost.  His LDL of 67 is no longer adequate and I would like to aim for less than 55.  To this end I am going to increase his atorvastatin to 20 mg daily.  We will recheck a fasting lipid profile and ALT in 1 month with a goal LDL less than 55.  I told him we may have to bump his atorvastatin up again.     If he has significant orthostasis I may discontinue his 2.5 of lisinopril.  At this time his blood pressure is very well controlled.  It is beneficial in light of his ischemic cardiomyopathy and mitral regurgitation.     I will have him follow up in a couple of months with my SELENA given his new onset AFib to see how he is doing, otherwise, I will plan on seeing him back in 1 year.     Thank you for  allowing me to participate in his care.     David Mensah MD, [facility]    Today's clinic visit entailed:  Review of external notes as documented elsewhere in note  Review of the result(s) of each unique test - echocardiogram, event monitor, lab work  Ordering of each unique test  Prescription drug management  45 minutes spent by me on the date of the encounter doing chart review, history and exam, documentation and further activities per the note  Provider  Link to MDM Help Grid     The level of medical decision making during this visit was of moderate complexity.      Orders Placed This Encounter   Procedures    Lipid Profile    ALT    Basic metabolic panel    Lipid Profile    ALT    Follow-Up with Cardiology       Orders Placed This Encounter   Medications    atorvastatin (LIPITOR) 20 MG tablet     Sig: Take 1 tablet (20 mg) by mouth daily     Dispense:  90 tablet     Refill:  4       Medications Discontinued During This Encounter   Medication Reason    atorvastatin (LIPITOR) 10 MG tablet Reorder (No AVS)         Encounter Diagnoses   Name Primary?    Coronary artery disease involving native coronary artery of native heart without angina pectoris Yes    Cardiomyopathy, ischemic     Non-rheumatic mitral regurgitation     Pure hypercholesterolemia     PAF (paroxysmal atrial fibrillation) (H)     SSS (sick sinus syndrome) (H)        CURRENT MEDICATIONS:  Current Outpatient Medications   Medication Sig Dispense Refill    apixaban ANTICOAGULANT (ELIQUIS ANTICOAGULANT) 5 MG tablet Take 1 tablet (5 mg) by mouth 2 times daily 180 tablet 4    atorvastatin (LIPITOR) 20 MG tablet Take 1 tablet (20 mg) by mouth daily 90 tablet 4    gabapentin (NEURONTIN) 300 MG capsule Take 300 mg by mouth At Bedtime      latanoprost (XALATAN) 0.005 % ophthalmic solution Place 1 drop into the right eye At Bedtime   5    lisinopril (ZESTRIL) 2.5 MG tablet Take 1 tablet (2.5 mg) by mouth daily 90 tablet 3    nitroGLYcerin (NITROSTAT) 0.4 MG  sublingual tablet For chest pain place 1 tablet under the tongue every 5 minutes for 3 doses. If symptoms persist 5 minutes after 1st dose call 911. 25 tablet 1    Psyllium (METAMUCIL FIBER PO) Take by mouth daily       hydrocortisone (PROCTO-MED HC) 2.5 % cream apply to the anus twice daily as needed (Patient not taking: Reported on 9/27/2023) 30 g 0       ALLERGIES     Allergies   Allergen Reactions    Cats     No Known Allergies        PAST MEDICAL HISTORY:  Past Medical History:   Diagnosis Date    Cardiomyopathy, ischemic     Coronary artery disease     Family history of diabetes mellitus     Father    Hyperlipidaemia     Mitral valve disorders(424.0)     mitral regurgitation    Old myocardial infarction 5/04    anterior       PAST SURGICAL HISTORY:  Past Surgical History:   Procedure Laterality Date    HC KNEE SCOPE, DIAGNOSTIC  8/1/2011    Right knee arthroscopy with partial medial meniscectomy    HEART CATH, ANGIOPLASTY  May 2004     intracoronary stent placement of mid LAD May 2004,    HEART CATH, ANGIOPLASTY  June 2004    elective staged stent of OM     ZC EYE EXAM ESTABLISHED PT  2003    ZZ REPAIR DETACH RETINA,SCLERAL BUCKLE  1967    Right eye       FAMILY HISTORY:  Family History   Problem Relation Age of Onset    Diabetes Father     C.A.D. 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[employer]     Comment: retired   Tobacco Use    Smoking status: Never    Smokeless tobacco: Never   Substance and Sexual Activity    Alcohol use: No     Alcohol/week: 0.0 standard drinks of alcohol    Drug use: No    Sexual activity: Yes     Partners: Female   Other Topics Concern    Caffeine Concern Yes      "Comment: 2-4 cups daily    Special Diet Yes     Comment: low fat, low cholesterol    Exercise Yes     Comment: 90 mins daily    Seat Belt Yes       Review of Systems:  Skin:          Eyes:         ENT:         Respiratory:  Negative       Cardiovascular:    Positive for;lightheadedness occ LH going up stairs, feels the best when working out  Gastroenterology:        Genitourinary:         Musculoskeletal:         Neurologic:         Psychiatric:         Heme/Lymph/Imm:         Endocrine:           Physical Exam:  Vitals: /64 (BP Location: Right arm, Patient Position: Sitting, Cuff Size: Adult Regular)   Pulse (!) 43   Ht 1.753 m (5' 9\")   Wt 71.7 kg (158 lb)   SpO2 97%   BMI 23.33 kg/m      Constitutional:  cooperative, alert and oriented, well developed, well nourished, in no acute distress thin      Skin:  warm and dry to the touch, no apparent skin lesions or masses noted          Head:  normocephalic, no masses or lesions        Eyes:  pupils equal and round, conjunctivae and lids unremarkable, sclera white, no xanthalasma, EOMS intact, no nystagmus        Lymph:      ENT:  no pallor or cyanosis, dentition good        Neck:  carotid pulses are full and equal bilaterally;no carotid bruit        Respiratory:  normal breath sounds, clear to auscultation, normal A-P diameter, normal symmetry, normal respiratory excursion, no use of accessory muscles         Cardiac: regular rhythm;no murmurs, gallops or rubs detected;normal S1 and S2                pulses full and equal                                        GI:           Extremities and Muscular Skeletal:  no edema;no spinal abnormalities noted;normal muscle strength and tone              Neurological:  no gross motor deficits        Psych:  affect appropriate, oriented to time, person and place        CC  David Mensah MD  [address]  [place] [address]    Thank you for allowing me to participate in the care of your " patient.      Sincerely,     David Mensah MD     [facility]

## 2023-10-30 ENCOUNTER — TELEPHONE (OUTPATIENT)
Dept: CARDIOLOGY | Facility: CLINIC | Age: 73
End: 2023-10-30

## 2023-10-30 ENCOUNTER — LAB (OUTPATIENT)
Dept: LAB | Facility: CLINIC | Age: 73
End: 2023-10-30
Payer: COMMERCIAL

## 2023-10-30 DIAGNOSIS — I25.10 CORONARY ARTERY DISEASE INVOLVING NATIVE CORONARY ARTERY OF NATIVE HEART WITHOUT ANGINA PECTORIS: Chronic | ICD-10-CM

## 2023-10-30 LAB
ALT SERPL W P-5'-P-CCNC: 18 U/L (ref 0–70)
CHOLEST SERPL-MCNC: 122 MG/DL
HDLC SERPL-MCNC: 59 MG/DL
LDLC SERPL CALC-MCNC: 54 MG/DL
NONHDLC SERPL-MCNC: 63 MG/DL
TRIGL SERPL-MCNC: 45 MG/DL

## 2023-10-30 PROCEDURE — 84460 ALANINE AMINO (ALT) (SGPT): CPT | Performed by: INTERNAL MEDICINE

## 2023-10-30 PROCEDURE — 80061 LIPID PANEL: CPT | Performed by: INTERNAL MEDICINE

## 2023-10-30 PROCEDURE — 36415 COLL VENOUS BLD VENIPUNCTURE: CPT | Performed by: INTERNAL MEDICINE

## 2023-10-30 NOTE — LETTER
October 31, 2023       TO: Sly Morton   [address] [facility] [address]       Dear Mr. Perrinsj,    The results of your recent Laboratory tests.    Results for orders placed or performed in visit on 10/30/23   Lipid Profile     Status: Normal   Result Value Ref Range    Cholesterol 122 <200 mg/dL    Triglycerides 45 <150 mg/dL    Direct Measure HDL 59 >=40 mg/dL    LDL Cholesterol Calculated 54 <=100 mg/dL    Non HDL Cholesterol 63 <130 mg/dL    Narrative    Cholesterol  Desirable:  <200 mg/dL    Triglycerides  Normal:  Less than 150 mg/dL  Borderline High:  150-199 mg/dL  High:  200-499 mg/dL  Very High:  Greater than or equal to 500 mg/dL    Direct Measure HDL  Female:  Greater than or equal to 50 mg/dL   Male:  Greater than or equal to 40 mg/dL    LDL Cholesterol  Desirable:  <100mg/dL  Above Desirable:  100-129 mg/dL   Borderline High:  130-159 mg/dL   High:  160-189 mg/dL   Very High:  >= 190 mg/dL    Non HDL Cholesterol  Desirable:  130 mg/dL  Above Desirable:  130-159 mg/dL  Borderline High:  160-189 mg/dL  High:  190-219 mg/dL  Very High:  Greater than or equal to 220 mg/dL   ALT     Status: Normal   Result Value Ref Range    ALT 18 0 - 70 U/L         Labs look good.   Please continue with medications, diet and exercise.     Sincerely,    [facility]

## 2023-10-30 NOTE — TELEPHONE ENCOUNTER
David Mensah MD Anderson, Barbara E RN12 minutes ago (2:22 PM)     Good enough for now.       Left a message for patient to call back.

## 2023-10-30 NOTE — TELEPHONE ENCOUNTER
Lipid panel 10/30/2023 noted. Ordered to follow increased statin therapy.    Component      Latest Ref Rng 10/30/2023  7:53 AM   Cholesterol      <200 mg/dL 122    Triglycerides      <150 mg/dL 45    HDL Cholesterol      >=40 mg/dL 59    LDL Cholesterol Calculated      <=100 mg/dL 54    Non HDL Cholesterol      <130 mg/dL 63    ALT      0 - 70 U/L 18      Per Dr. Mensah's dictation 9/27/2023:  His fasting lipid profile is good on only 10 mg of atorvastatin.  However I told him they moved the goalpost.  His LDL of 67 is no longer adequate and I would like to aim for less than 55.  To this end I am going to increase his atorvastatin to 20 mg daily.  We will recheck a fasting lipid profile and ALT in 1 month with a goal LDL less than 55.  I told him we may have to bump his atorvastatin up again.     Will message Dr. Mensah to review

## 2023-10-31 NOTE — TELEPHONE ENCOUNTER
Patient called with FLP /ALT results.  Patient request lab copy be mailed to him. - sent.   Patient to continue on medications, diet and exercise.

## 2023-11-02 DIAGNOSIS — I48.0 PAF (PAROXYSMAL ATRIAL FIBRILLATION) (H): Primary | ICD-10-CM

## 2023-11-02 DIAGNOSIS — I25.10 CORONARY ARTERY DISEASE INVOLVING NATIVE CORONARY ARTERY OF NATIVE HEART WITHOUT ANGINA PECTORIS: ICD-10-CM

## 2023-11-02 RX ORDER — LISINOPRIL 2.5 MG/1
2.5 TABLET ORAL DAILY
Qty: 90 TABLET | Refills: 3 | Status: SHIPPED | OUTPATIENT
Start: 2023-11-02 | End: 2024-09-16

## 2023-12-29 DIAGNOSIS — I48.0 PAF (PAROXYSMAL ATRIAL FIBRILLATION) (H): ICD-10-CM

## 2024-01-29 ENCOUNTER — TELEPHONE (OUTPATIENT)
Dept: CARDIOLOGY | Facility: CLINIC | Age: 74
End: 2024-01-29
Payer: COMMERCIAL

## 2024-01-29 NOTE — TELEPHONE ENCOUNTER
Spoke to patient, says this past Saturday 1/27 he had a pinching sensation in his chest, rates the discomfort at a 1/10. Last for seconds at at time. He denied any obvious triggers. He exercised for 45-60min on Sunday and had no symptoms, says he actually felt better. Then again, this morning, he felt it again eating breakfast and then again later walking around his house. He denies any chest pain, jaw/arm/back pain, shortness of breath, weight gain, edema, palpitations, lightheadedness/dizziness, fatigue, etc. He wonders if this is a symptom of his afib, notes on past monitors he has a <1% occurrence of this but wonders if it is progressing.     He prefers to just monitor at this time, will reach out if his symptoms progress or evolve. Dr Mensah updated.

## 2024-01-29 NOTE — TELEPHONE ENCOUNTER
[facility] Call Center    Phone Message    May a detailed message be left on voicemail: yes     Reason for Call: Symptoms or Concerns     If patient has red-flag symptoms, warm transfer to triage line    Current symptom or concern: pin pricks in upper left side of chest     Symptoms have been present for:  3 day(s)    Has patient previously been seen for this? No          Action Taken: Other: cardio    Travel Screening: Not Applicable

## 2024-01-29 NOTE — TELEPHONE ENCOUNTER
David Mensah MD  You5 minutes ago (4:09 PM)     I agree.  Very atypical no need to evaluate further       Pt is aware to continue to monitor, will call back with any worsening/progressive symptoms.

## 2024-02-06 ENCOUNTER — TELEPHONE (OUTPATIENT)
Dept: CARDIOLOGY | Facility: CLINIC | Age: 74
End: 2024-02-06
Payer: COMMERCIAL

## 2024-02-06 DIAGNOSIS — I48.0 PAF (PAROXYSMAL ATRIAL FIBRILLATION) (H): Primary | ICD-10-CM

## 2024-02-06 DIAGNOSIS — I49.5 SSS (SICK SINUS SYNDROME) (H): ICD-10-CM

## 2024-02-06 DIAGNOSIS — I25.10 CORONARY ARTERY DISEASE INVOLVING NATIVE CORONARY ARTERY OF NATIVE HEART WITHOUT ANGINA PECTORIS: ICD-10-CM

## 2024-02-06 NOTE — TELEPHONE ENCOUNTER
"12:11 Message from patient wondering about his \"pinprick\" sensations. He is still having them and is concerned.    1303 attempted to contact the patient to review his concerns. Left message for patient to call back to Team 2 R.N.s @ [phone]       Patient called about a similar issue 1/29/2024.  Per Dr. Mensah's review: Very atypical no need to evaluate further       1320 spoke with patient. He states he has continued to notice this \"stretchy\" feeling across his chest for the last 3 weeks. It's not pain and he can't trigger it. However, it occurs all day long at intermittent intervals. He states he has been paying more attention to his rhythm and thinks he is having a lot of irregularity. He is just checking his pulse, no smart watch.  Patient wonders if he has afib again? He is on eliquis 5mg BID.    These episodes are bothering him enough that he would like to have them checked out so he knows what is going on.    Will message Dr. Mensah to review        "

## 2024-02-07 NOTE — TELEPHONE ENCOUNTER
David Mensah MD Anderson, Damaris HOLDER, RN16 hours ago (5:20 PM)     We can send him a 7-day Zio patch.  Although he might want to consider getting a [employer] mobile device or some sort of smart watch so he can evaluate on his own as necessary going forward.       Left a message for patient to call back to review recommendations.     Addendum 1045: Spoke to patient and he was agreeable to doing the monitor. Order placed for mail out, address confirmed. Will follow up again once report is back.

## 2024-02-08 ENCOUNTER — ORDERS ONLY (AUTO-RELEASED) (OUTPATIENT)
Dept: CARDIOLOGY | Facility: CLINIC | Age: 74
End: 2024-02-08
Payer: COMMERCIAL

## 2024-02-08 DIAGNOSIS — I25.10 CORONARY ARTERY DISEASE INVOLVING NATIVE CORONARY ARTERY OF NATIVE HEART WITHOUT ANGINA PECTORIS: ICD-10-CM

## 2024-02-08 DIAGNOSIS — I48.0 PAF (PAROXYSMAL ATRIAL FIBRILLATION) (H): ICD-10-CM

## 2024-02-08 DIAGNOSIS — I49.5 SSS (SICK SINUS SYNDROME) (H): ICD-10-CM

## 2024-03-04 ENCOUNTER — TELEPHONE (OUTPATIENT)
Dept: CARDIOLOGY | Facility: CLINIC | Age: 74
End: 2024-03-04
Payer: COMMERCIAL

## 2024-03-04 PROCEDURE — 93244 EXT ECG>48HR<7D REV&INTERPJ: CPT | Performed by: INTERNAL MEDICINE

## 2024-03-04 NOTE — TELEPHONE ENCOUNTER
Reply from Dr. Mensah:  No A-fib noted.  Just his baseline SVT.  His intrinsic heart rate is too slow to add a beta-blocker.  As I previously recommended I would consider getting a [employer] mobile device or a smart watch to monitor going forward     1500 called patient to discuss monitor reports and Dr. Mensah's recommendations. He states the symptoms have decreased in intensity and occurrence rate.    He will think about getting a [employer] mobile.

## 2024-03-04 NOTE — TELEPHONE ENCOUNTER
Ziopatch completed as below, done for patient reports of pinching/stretching chest discomfort. Patient is not on any rate controlling meds. Hx afib, SSS. Dr Mensah messaged to review.     SR 34-150bpm, avg 49bpm   7 runs SVT, longest x12 beats avg 107bpm  <1% PVC/PAC's  Patient symptoms correlated with NSR

## 2024-04-06 NOTE — LETTER
"9/9/2020    Eriberto Shetty MD  [address], [address]  [facility] [address]    RE: Sly Perrinsj       Dear Colleague,    I had the pleasure of seeing Sly Morton in the [facility].    CARDIOLOGY TELEPHONE VISIT  This visit is being conducted as a virtual visit due to the emphasis on mitigation of the COVID-19 virus pandemic. The clinician has decided that the risk of an in-office visit outweighs the benefit for this patient.       Sly Morton is a 70 year old male who is being evaluated via a billable telephone visit.      The patient has been notified of following:     \"This telephone visit will be conducted via a call between you and your physician/provider. We have found that certain health care needs can be provided without the need for a physical exam.  This service lets us provide the care you need with a short phone conversation.  If a prescription is necessary we can send it directly to your pharmacy.  If lab work is needed we can place an order for that and you can then stop by our lab to have the test done at a later time.    Telephone visits are billed at different rates depending on your insurance coverage. During this emergency period, for some insurers they may be billed the same as an in-person visit.  Please reach out to your insurance provider with any questions.    If during the course of the call the physician/provider feels a telephone visit is not appropriate, you will not be charged for this service.\"    Patient has given verbal consent for Telephone visit?  Yes    What phone number would you like to be contacted at? [phone]    How would you like to obtain your AVS? Mail a copy    Review Of Systems  Skin: negative  Eyes: negative  Ears/Nose/Throat: negative  Respiratory: No shortness of breath, dyspnea on exertion, cough, or hemoptysis  Cardiovascular: negative  Gastrointestinal: negative  Genitourinary: negative  Musculoskeletal: " negative  Neurologic: negative  Psychiatric: negative  Hematologic/Lymphatic/Immunologic: negative  Endocrine: negative    Tamia Mcghee CMA 9/9/2020      Primary Cardiologist:  Dr. Mensah    HPI:  Sly Morton is a 70 year old male with past medical history including coronary artery disease dating back to 2004 when he presented with an anterior wall myocardial infarction.  He underwent stenting of his mid LAD.  In a staged fashion, he underwent stenting of the OM.  Ejection fraction was 35-40% with 2-3+ mitral regurgitation.  His last evaluation of his coronary anatomy was with a stress nuclear scan in 08/2012.  This demonstrated a small fixed anteroapical defect consistent with a nontransmural infarct, and there was inferior diaphragmatic attenuation.  Ejection fraction was estimated to be 47%.   Echocardiogram 8/28/18: LVEF 35-40% with trace to mild mitral regurgitation, unchanged from 2016.   Echo 8/28/19: LVEF 40-45%, mild MR.         Interval History:   Sly presents today (by telephone visit) for his annual followup.  He tells me he continues to do very well from a cardiac standpoint without any chest pain, shortness of breath, edema, dizziness, syncope.      Some mild ROBBINS with stairs.   But does work out daily.  He does stretching for 30 minutes followed elliptical daily for about 20 minutes. Tries to do intervals. No sxs with this. He also does pushups, kettle ball, core strength.   He works hard on a healthy diet, though does allow some treats and snacks at some times.  Sly was seen by his Primary Care provider this summer as well.        I have reviewed and updated the patient's Past Medical History, Social History, Family History and Medication List.      MEDICATIONS:  Current Outpatient Medications   Medication Sig Dispense Refill     ASPIRIN 81 MG OR TABS 1 TABLET DAILY       atorvastatin (LIPITOR) 10 MG tablet Take 1 tablet (10 mg) by mouth daily 90 tablet 0     hydrocortisone (PROCTO-MED HC)  2.5 % cream apply to the anus twice daily as needed 30 g 0     latanoprost (XALATAN) 0.005 % ophthalmic solution Place 1 drop into the right eye At Bedtime   5     lisinopril (ZESTRIL) 5 MG tablet Take 1 tablet (5 mg) by mouth daily 90 tablet 0     Psyllium (METAMUCIL FIBER PO)            ALLERGIES:   Allergies   Allergen Reactions     Cats      No Known Allergies          DIAGNOSTICS:  Component      Latest Ref Rng & Units 9/3/2020   Sodium      133 - 144 mmol/L 139   Potassium      3.4 - 5.3 mmol/L 4.1   Chloride      94 - 109 mmol/L 107   Carbon Dioxide      20 - 32 mmol/L 30   Anion Gap      3 - 14 mmol/L 2 (L)   Glucose      70 - 99 mg/dL 93   Urea Nitrogen      7 - 30 mg/dL 18   Creatinine      0.66 - 1.25 mg/dL 0.94   GFR Estimate      >60 mL/min/1.73:m2 83   GFR Estimate If Black      >60 mL/min/1.73:m2 >90   Calcium      8.5 - 10.1 mg/dL 9.0   Cholesterol      <200 mg/dL 135   Triglycerides      <150 mg/dL 47   HDL Cholesterol      >39 mg/dL 67   LDL Cholesterol Calculated      <100 mg/dL 59   Non HDL Cholesterol      <130 mg/dL 68       Echo 8/28/19:  Interpretation Summary  The left ventricle is normal in size. There is normal left ventricular wall  thickness. Left ventricular systolic function is mild to moderately reduced.  The visual ejection fraction is estimated at 40-45%. LVEF 42% based on biplane  2D tracing. Grade I or early diastolic dysfunction. There is mild-moderate  global hypokinesia of the left ventricle. There is no thrombus seen in the  left ventricle  The right ventricle is borderline dilated. Mildly decreased right ventricular  systolic function.  There is moderate biatrial enlargement. 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  Mild mitral and tricuspid regurgitation.  No pericardial effusion.  In direct comparison to the previous study dated 08/28/2018, left ventricular  systolic function appears similar.        ASSESSMENT/PLAN:  Sly is a very pleasant, 70-year-old gentleman with a history  of coronary artery disease and cardiomyopathy.  He also has a history of mitral regurgitation.      Sly continues to do very well from a cardiac standpoint without any clinical evidence of ischemia, heart failure or significant arrhythmia.       His echo in 2019 shows LVEF 40-45%, mild MR.  This is stable, or actually a bit improved since 2016.       Repeat fasting lipid panel 9/3/20 shows , HDL 67, LDL 59, TG 47.        BMP 9/3/20 is unremarkable.     He will continue his current medication regimen.  Reports he does not need refills.       Follow up in 1 year with Dr. Mensah with repeat fasting lipid panel, basic metabolic panel.   Dr. Mensah does want a stress echo next year.           Thank you very much for allowing me to participate in the care of Sly Morton.       I have reviewed the note as documented above.  This accurately captures the substance of my conversation with the patient.      Phone call contact time  Call Started at 0910  Call Ended at 0923      Thank you for allowing me to participate in the care of your patient.    Sincerely,     Delvin Dougherty PA-C     [facility]   albuterol 90 mcg/inh inhalation powder: 2 puff(s) inhaled every 6 hours, As Needed -for bronchospasm   colchicine 0.6 mg oral tablet: 1 tab(s) orally 2 times a day  furosemide 40 mg oral tablet: 1 tab(s) orally once a day  levothyroxine 150 mcg (0.15 mg) oral tablet: 1 tab(s) orally once a day  metFORMIN 500 mg oral tablet: 1 tab(s) orally 2 times a day  pantoprazole 40 mg oral delayed release tablet: 1 tab(s) orally once a day (before a meal) Take once a day while taking prednisone  predniSONE 10 mg oral tablet: 3 tab(s) orally once a day Take 3 tablets for 5 days, then take 2 tablets for 7 days, then 1 tablet for 7 days  Suboxone 8 mg-2 mg sublingual tablet: 2.5 film(s) sublingual once a day

## 2024-04-22 DIAGNOSIS — B00.9 HERPES SIMPLEX VIRUS INFECTION: ICD-10-CM

## 2024-04-22 RX ORDER — VALACYCLOVIR HYDROCHLORIDE 1 G/1
1000 TABLET, FILM COATED ORAL 3 TIMES DAILY
COMMUNITY
Start: 2024-04-22

## 2024-04-22 NOTE — TELEPHONE ENCOUNTER
Sly Morton is requesting a refill of:    Refused Prescriptions:                       Disp   Refills    valACYclovir (VALTREX) 1000 mg tablet [Pha*                Sig: Take 1 tablet (1,000 mg) by mouth 3 times daily  Refused By: MAME LANDRY  Reason for Refusal: Patient needs appointment    Needs OV for refills. Last seen 1/25/23

## 2024-04-25 ENCOUNTER — OFFICE VISIT (OUTPATIENT)
Dept: FAMILY MEDICINE | Facility: CLINIC | Age: 74
End: 2024-04-25

## 2024-04-25 VITALS
DIASTOLIC BLOOD PRESSURE: 72 MMHG | OXYGEN SATURATION: 98 % | TEMPERATURE: 97.6 F | SYSTOLIC BLOOD PRESSURE: 130 MMHG | RESPIRATION RATE: 20 BRPM | WEIGHT: 160 LBS | HEIGHT: 69 IN | HEART RATE: 63 BPM | BODY MASS INDEX: 23.7 KG/M2

## 2024-04-25 DIAGNOSIS — G25.81 RESTLESS LEGS SYNDROME (RLS): ICD-10-CM

## 2024-04-25 DIAGNOSIS — E78.2 MIXED HYPERLIPIDEMIA: Primary | ICD-10-CM

## 2024-04-25 DIAGNOSIS — I25.10 CORONARY ARTERY DISEASE INVOLVING NATIVE CORONARY ARTERY OF NATIVE HEART WITHOUT ANGINA PECTORIS: ICD-10-CM

## 2024-04-25 DIAGNOSIS — Z00.00 ENCOUNTER FOR ANNUAL WELLNESS EXAM IN MEDICARE PATIENT: ICD-10-CM

## 2024-04-25 DIAGNOSIS — I49.5 SSS (SICK SINUS SYNDROME) (H): ICD-10-CM

## 2024-04-25 DIAGNOSIS — I48.0 PAF (PAROXYSMAL ATRIAL FIBRILLATION) (H): ICD-10-CM

## 2024-04-25 DIAGNOSIS — B00.9 HERPES SIMPLEX VIRUS INFECTION: ICD-10-CM

## 2024-04-25 DIAGNOSIS — Z12.5 SPECIAL SCREENING FOR MALIGNANT NEOPLASM OF PROSTATE: ICD-10-CM

## 2024-04-25 LAB
ALBUMIN SERPL-MCNC: 3.8 G/DL (ref 3.6–5.1)
ALBUMIN/GLOB SERPL: 1.3 {RATIO} (ref 1–2.5)
ALP SERPL-CCNC: 40 U/L (ref 33–130)
ALT 1742-6: 12 U/L (ref 0–32)
AST 1920-8: 17 U/L (ref 0–35)
BILIRUB SERPL-MCNC: 0.8 MG/DL (ref 0.2–1.2)
BUN SERPL-MCNC: 24 MG/DL (ref 7–25)
BUN/CREATININE RATIO: 21.1 (ref 6–32)
CALCIUM SERPL-MCNC: 9 MG/DL (ref 8.6–10.3)
CHLORIDE SERPLBLD-SCNC: 105 MMOL/L (ref 98–110)
CHOLEST SERPL-MCNC: 128 MG/DL (ref 0–199)
CHOLEST/HDLC SERPL: 2 {RATIO} (ref 0–5)
CO2 SERPL-SCNC: 28 MMOL/L (ref 20–32)
CREAT SERPL-MCNC: 1.14 MG/DL (ref 0.6–1.3)
GLOBULIN, CALCULATED - QUEST: 2.9 (ref 1.9–3.7)
GLUCOSE SERPL-MCNC: 98 MG/DL (ref 60–99)
HDLC SERPL-MCNC: 62 MG/DL (ref 40–150)
LDLC SERPL CALC-MCNC: 46 MG/DL (ref 0–130)
POTASSIUM SERPL-SCNC: 4.75 MMOL/L (ref 3.5–5.3)
PROT SERPL-MCNC: 6.7 G/DL (ref 6.1–8.1)
SODIUM SERPL-SCNC: 139.5 MMOL/L (ref 135–146)
TRIGL SERPL-MCNC: 102 MG/DL (ref 0–149)

## 2024-04-25 PROCEDURE — G0439 PPPS, SUBSEQ VISIT: HCPCS | Performed by: FAMILY MEDICINE

## 2024-04-25 PROCEDURE — 80061 LIPID PANEL: CPT | Performed by: FAMILY MEDICINE

## 2024-04-25 PROCEDURE — 99214 OFFICE O/P EST MOD 30 MIN: CPT | Mod: 25 | Performed by: FAMILY MEDICINE

## 2024-04-25 PROCEDURE — 80053 COMPREHEN METABOLIC PANEL: CPT | Performed by: FAMILY MEDICINE

## 2024-04-25 PROCEDURE — 36415 COLL VENOUS BLD VENIPUNCTURE: CPT | Performed by: FAMILY MEDICINE

## 2024-04-25 RX ORDER — VALACYCLOVIR HYDROCHLORIDE 1 G/1
1000 TABLET, FILM COATED ORAL 2 TIMES DAILY
Qty: 20 TABLET | Refills: 0 | Status: SHIPPED | OUTPATIENT
Start: 2024-04-25 | End: 2024-05-05

## 2024-04-25 NOTE — PROGRESS NOTES
Sly Morton is a 73 year old male who presents for Medicare Annual Wellness Visit.    Current providers caring for this patient include:  Patient Care Team:  Eriberto Shetty MD as PCP - General (Family Practice)  Eriberto Shetty MD as Assigned PCP  Teresa Sutton APRN CNP as Assigned Heart and Vascular Provider    Complete Medical and Social history reviewed with patient, outlined below.    Patient Active Problem List   Diagnosis    Sciatica    Family history of diabetes mellitus    CATARACT NEC Right     Health Care Home    ACP (advance care planning)    Coronary artery disease involving native coronary artery of native heart without angina pectoris    Cardiomyopathy, ischemic    Pure hypercholesterolemia    Non-rheumatic mitral regurgitation    ST elevation myocardial infarction involving left anterior descending (LAD) coronary artery (H)    PAF (paroxysmal atrial fibrillation) (H)    SSS (sick sinus syndrome) (H)       Past Medical History:   Diagnosis Date    Cardiomyopathy, ischemic     Coronary artery disease     Family history of diabetes mellitus     Father    Hyperlipidaemia     Mitral valve disorders(424.0)     mitral regurgitation    Old myocardial infarction 5/04    anterior       Past Surgical History:   Procedure Laterality Date    HC KNEE SCOPE, DIAGNOSTIC  8/1/2011    Right knee arthroscopy with partial medial meniscectomy    HEART CATH, ANGIOPLASTY  May 2004     intracoronary stent placement of mid LAD May 2004,    HEART CATH, ANGIOPLASTY  June 2004    elective staged stent of OM     ZZC EYE EXAM ESTABLISHED PT  2003    Z REPAIR DETACH RETINA,SCLERAL BUCKLE  1967    Right eye       Family History   Problem Relation Age of Onset    Diabetes Father     C.A.D. Father     Cancer - colorectal No family hx of     Prostate Cancer No family hx of        Social History     Tobacco Use    Smoking status: Never    Smokeless tobacco: Never   Substance Use Topics    Alcohol use: No     " Alcohol/week: 0.0 standard drinks of alcohol       Diet: regular, low salt/low fat-has been trying to eat healthy overall lately-wife helps   Physical Activity: patient exercises 3 times weekly  Depression Screen:    Over the past 2 weeks, patient has felt down, depressed, or hopeless:  No    Over the past 2 weeks, patient has felt little interest or pleasure in doing things: No    Functional ability/Safety screen:  Up and go test (able to get up and walk longer than 30 seconds): Passed  Patient needs assistance with: nothing  Patient's home has the following possible safety concerns: none identified  Patient has concerns about his hearing:  No, lately it has been ok. Did have some concerns with right ear before but this was because of wax   Cognitive Screen  Patient repeats three objects (ball, flag, tree)      Clock drawing test:   NORMAL  Recalls three objects after 3 minutes (ball,flag,tree):                                                                                               recalls 3 objects (3 points)    Physical Exam:  /72 (BP Location: Right arm, Patient Position: Sitting, Cuff Size: Adult Large)   Pulse 63   Temp 97.6  F (36.4  C) (Temporal)   Resp 20   Ht 1.753 m (5' 9\")   Wt 72.6 kg (160 lb)   SpO2 98%   BMI 23.63 kg/m     Body mass index is 23.63 kg/m .           End of Life Planning:   Patient currently has an advanced directive: Yes.  Practitioner is supportive of decision.    Education/Counseling:   Based on review of the above information, the following items were addressed:      Elevated blood pressure - follow-up plans made    Appropriate preventive services were discussed with this patient, including applicable screening as appropriate for cardiovascular disease, diabetes, osteopenia/osteoporosis, and glaucoma.  As appropriate for age/gender, discussed screening for colorectal cancer, prostate cancer, breast cancer, and cervical cancer.   Checklist reviewing preventive " services available has been given to the patient.          Assessment & Plan     Mixed hyperlipidemia  Control uncertain, continue current medications at current doses pending labs  - Lipid Panel ([facility])  - VENOUS COLLECTION    Coronary artery disease involving native coronary artery of native heart without angina pectoris  stable symptomatically continue current medications at current doses , reviewed cardiology notes  - Lipid Panel ([facility])  - Comprehensive Metobolic Panel ([facility])  - VENOUS COLLECTION    PAF (paroxysmal atrial fibrillation) (H)  Well controlled, continue current medications at current doses     SSS (sick sinus syndrome) (H)  stable    Restless legs syndrome (RLS)  Well controlled, continue current medications at current doses     Special screening for malignant neoplasm of prostate    - PSA Total ([facility])  - VENOUS COLLECTION    Encounter for annual wellness exam in Medicare patient  discussed preventitive healthcare Continue to work on healthy diet and exercise, discussed healthy habits     Herpes simplex virus infection  Discussed using 2000 mg two doses 12 hours apart for flares  - valACYclovir (VALTREX) 1000 mg tablet  Dispense: 20 tablet; Refill: 0      Review of external notes as documented elsewhere in note  Review of the result(s) of each unique test - labs  Ordering of each unique test  Prescription drug management          FUTURE APPOINTMENTS:       - Follow-up visit in 1 yr  Regular exercise    No follow-ups on file.    Lashanda Heart is a 73 year old, presenting for the following health issues:  Recheck Medication (Medication check and review for valtrex, did have some old ones at home and used these to help with a recent outbreak, abreva helps as well ), Medicare Visit (Annual medicare wellness visit ), and Consult For (Has been getting leg cramps, saw neurology and has been taking gabapentin but does not feel that it is working very well, takes 300 mg before going to bed at night  ")    HPI       Hyperlipidemia Follow-Up    Are you regularly taking any medication or supplement to lower your cholesterol?   Yes- atorvastatin  Are you having muscle aches or other side effects that you think could be caused by your cholesterol lowering medication?  No    Atrial Fibrillation Follow-up    Symptoms: no recent chest pain, significant palpitations, dizziness/lightheadedness, dyspnea, or increased peripheral edema.  Stroke prevention: DOAC (Eliquis, Xarelto, Pradaxa)        6/4/2023     3:00 PM 6/12/2023     7:36 AM 8/1/2023     8:05 AM 9/27/2023     8:34 AM 4/25/2024     9:12 AM   Date   Pulse 58 48 52 43 63     Current        Vascular Disease Follow-up    How often do you take nitroglycerin? Never  Do you take an aspirin every day? No    RLS- using gabapentin, no side effects   How many servings of fruits and vegetables do you eat daily?  2-3  On average, how many sweetened beverages do you drink each day (Examples: soda, juice, sweet tea, etc.  Do NOT count diet or artificially sweetened beverages)?   1  How many days per week do you exercise enough to make your heart beat faster? 7  How many minutes a day do you exercise enough to make your heart beat faster? 60 or more  How many days per week do you miss taking your medication? 0        Review of Systems  Constitutional, HEENT, cardiovascular, pulmonary, gi and gu systems are negative, except as otherwise noted.      Objective    /72 (BP Location: Right arm, Patient Position: Sitting, Cuff Size: Adult Large)   Pulse 63   Temp 97.6  F (36.4  C) (Temporal)   Resp 20   Ht 1.753 m (5' 9\")   Wt 72.6 kg (160 lb)   SpO2 98%   BMI 23.63 kg/m    Body mass index is 23.63 kg/m .  Physical Exam   GENERAL: alert and no distress  NECK: no adenopathy, no asymmetry, masses, or scars  RESP: lungs clear to auscultation - no rales, rhonchi or wheezes  CV: regular rate and rhythm, normal S1 S2, no S3 or S4, no murmur, click or rub, no peripheral " edema  ABDOMEN: soft, nontender, no hepatosplenomegaly, no masses and bowel sounds normal  MS: no gross musculoskeletal defects noted, no edema  PSYCH: mentation appears normal, affect normal/bright    Lab on 10/30/2023   Component Date Value Ref Range Status    Cholesterol 10/30/2023 122  <200 mg/dL Final    Triglycerides 10/30/2023 45  <150 mg/dL Final    Direct Measure HDL 10/30/2023 59  >=40 mg/dL Final    LDL Cholesterol Calculated 10/30/2023 54  <=100 mg/dL Final    Non HDL Cholesterol 10/30/2023 63  <130 mg/dL Final    ALT 10/30/2023 18  0 - 70 U/L Final    Reference intervals for this test were updated on 6/12/2023 to more accurately reflect our healthy population. There may be differences in the flagging of prior results with similar values performed with this method. Interpretation of those prior results can be made in the context of the updated reference intervals.             Signed Electronically by: Eriberto Shetty MD

## 2024-04-25 NOTE — NURSING NOTE
Chief Complaint   Patient presents with    Recheck Medication     Medication check and review for valtrex, did have some old ones at home and used these to help with a recent outbreak, abreva helps as well     Medicare Visit     Annual medicare wellness visit     Consult For     Has been getting leg cramps, saw neurology and has been taking gabapentin but does not feel that it is working very well, takes 300 mg before going to bed at night      Pre-visit Screening:  Immunizations:  up to date  Colonoscopy:  is up to date  Mammogram: na  Asthma Action Test/Plan:  na  PHQ9:  phq2 done today   GAD7:  na  Questioned patient about current smoking habits Pt. has never smoked.  Ok to leave detailed message on voice mail for today's visit only yes, phone # [phone] (home)

## 2024-04-26 LAB — ABBOTT PSA - QUEST: 0.74 NG/ML

## 2024-04-29 ENCOUNTER — OFFICE VISIT (OUTPATIENT)
Dept: FAMILY MEDICINE | Facility: CLINIC | Age: 74
End: 2024-04-29

## 2024-04-29 VITALS
DIASTOLIC BLOOD PRESSURE: 86 MMHG | HEART RATE: 54 BPM | TEMPERATURE: 97.6 F | SYSTOLIC BLOOD PRESSURE: 118 MMHG | WEIGHT: 165 LBS | OXYGEN SATURATION: 97 % | BODY MASS INDEX: 24.37 KG/M2 | RESPIRATION RATE: 20 BRPM

## 2024-04-29 DIAGNOSIS — M67.912 TENDINOPATHY OF LEFT ROTATOR CUFF: ICD-10-CM

## 2024-04-29 DIAGNOSIS — M25.512 CHRONIC PAIN IN LEFT SHOULDER: Primary | ICD-10-CM

## 2024-04-29 DIAGNOSIS — M19.012 ARTHRITIS OF LEFT ACROMIOCLAVICULAR JOINT: ICD-10-CM

## 2024-04-29 DIAGNOSIS — G89.29 CHRONIC PAIN IN LEFT SHOULDER: Primary | ICD-10-CM

## 2024-04-29 PROCEDURE — 73030 X-RAY EXAM OF SHOULDER: CPT | Mod: LT | Performed by: STUDENT IN AN ORGANIZED HEALTH CARE EDUCATION/TRAINING PROGRAM

## 2024-04-29 PROCEDURE — G2211 COMPLEX E/M VISIT ADD ON: HCPCS | Performed by: STUDENT IN AN ORGANIZED HEALTH CARE EDUCATION/TRAINING PROGRAM

## 2024-04-29 PROCEDURE — 99214 OFFICE O/P EST MOD 30 MIN: CPT | Performed by: STUDENT IN AN ORGANIZED HEALTH CARE EDUCATION/TRAINING PROGRAM

## 2024-04-29 NOTE — PATIENT INSTRUCTIONS
Follow-up for injection anytime     Physical Therapy  [facility]   [address] [place] [address]  [phone] -- appt line

## 2024-04-29 NOTE — PROGRESS NOTES
ASSESSMENT & PLAN      ICD-10-CM    1. Chronic pain in left shoulder  M25.512 XR Shoulder Left G/E 3 Views    G89.29 Physical Therapy  Referral      2. Tendinopathy of left rotator cuff  M67.912 Physical Therapy  Referral      3. Arthritis of left acromioclavicular joint  M19.012 Physical Therapy  Referral         Consult 1 year left shoulder pain  XRs obtained and reviewed with patient.   Hx and exam most c/w ac arthritis and some impingement/RTC tendinopathy.   Reviewed options for treatment and/or further eval, agreed on plan below.     Patient Instructions   Follow-up for injection anytime     Physical Therapy  [facility]   [address] [place] [id number]  [phone] -- appt line    If not improving within 6-8 weeks will follow-up to discuss next steps       >30 minutes spent on the date of the encounter doing chart review, history and exam, documentation and further activities per the note.    Kevin Strange MD, [facility]      -----    SUBJECTIVE  Sly PANCHITO Praveen is a/an 73 year old male who is seen for evaluation of     Chief Complaint   Patient presents with    Consult For     Left shoulder and arm pain that has been around since last April when he had a COVID shot done, had a burning feeling in his arm near the injection site for awhile but now it is on and off, has been trying to lift more weights lately since late September, did an exercise called the  for awhile and got up to 35 pounds but this caused more pain and now mainly feeling in his shoulder area, has a hard time pulling blankets over him and putting a seatbelt on, unable to sleep on left side now      The patient is seen by themselves.  The patient is Right handed    Date of Onset: one year  Mechanism of injury: Reports insidious onset without acute precipitating event. Wonders if related to vaccine he received, or some weight training he was doing, but  wasn't acute onset.   Location of Pain: left shoulder  Worsened by: has a hard time pulling blankets over him and putting a seatbelt on, unable to sleep on left side now   Treatments tried: rest/activity avoidance  Associated symptoms: no distal numbness or tingling; denies redness or warmth  no prior shoulder hx     Patient's PMH, PSH, and family hx reviewed.      OBJECTIVE:  /86 (BP Location: Left arm, Patient Position: Sitting, Cuff Size: Adult Large)   Pulse 54   Temp 97.6  F (36.4  C) (Temporal)   Resp 20   Wt 74.8 kg (165 lb)   SpO2 97%   BMI 24.37 kg/m     Alert, NAD  NC/AT  Sclerae anicteric  Regular  Resp nonlabored  Skin warm and dry  No focal neuro deficits. Speech intact. Normal gait.  Appropriate affect    L shoulder AROM 140/140/60/T6, symmetric  5/5 abd, ER, IR  Neg empty can  +impingement  +ttp AC joint only  CMS intact distally    RADIOLOGY:  Results for orders placed or performed in visit on 04/29/24   XR Shoulder Left G/E 3 Views     Status: None    Narrative    Radiologist Consultation/:   Fax:  [phone]  _____________________________________________________________________________________________________________________________________________________________________________________________________________________________________________________________________________________________________________________________________________________________________________________________________________________________________________________________________________________________________  PATIENT NAME: LENA MCGOWAN  YOB: 1950 Age: 73 ACCESSION NUMBER: [id number]  SEX: M ORDERING PROVIDER: Kevin Strange  FACILITY: [facility] PRIMARY PROVIDER:  PATIENT ID: [id number] INTERESTED PARTY:  Page 1 of  1  _________________________________________________________________________________________________________________________________________________________________________________________________________________________________________________________________________________________________________________________________________________________________________________________  EXAM: XRAY SHOULDER,2+VWS LEFT  LOCATION: [facility]  DATE: 4/29/2024  INDICATION: Left shoulder pain.  COMPARISON: None.  IMPRESSION: Mild AC joint arthrosis. Glenohumeral joint space is maintained. Bones are demineralized. No  evidence of an acute fracture or dislocation. Tiny ossific density along the anterior aspect of the left humeral  head is chronic in appearance. Coronary artery stenting.  SIGNED BY: Tre Wright MD 4/29/2024 6:10 PM

## 2024-04-29 NOTE — NURSING NOTE
Chief Complaint   Patient presents with    Consult For     Left shoulder and arm pain that has been around since last April when he had a COVID shot done, had a burning feeling in his arm near the injection site for awhile but now it is on and off, has been trying to lift more weights lately since late September, did an exercise called the  for awhile and got up to 35 pounds but this caused more pain and now mainly feeling in his shoulder area, has a hard time pulling blankets over him and putting a seatbelt on, unable to sleep on left side now      Pre-visit Screening:  Immunizations:  up to date  Colonoscopy:  is up to date  Mammogram: na  Asthma Action Test/Plan:  na  PHQ9:  na  GAD7:  na  Questioned patient about current smoking habits Pt. has never smoked.  Ok to leave detailed message on voice mail for today's visit only yes, phone # [phone] (home)

## 2024-05-01 ENCOUNTER — OFFICE VISIT (OUTPATIENT)
Dept: FAMILY MEDICINE | Facility: CLINIC | Age: 74
End: 2024-05-01

## 2024-05-01 VITALS
BODY MASS INDEX: 24.37 KG/M2 | HEART RATE: 53 BPM | SYSTOLIC BLOOD PRESSURE: 122 MMHG | TEMPERATURE: 98 F | WEIGHT: 165 LBS | DIASTOLIC BLOOD PRESSURE: 60 MMHG | RESPIRATION RATE: 20 BRPM | OXYGEN SATURATION: 95 %

## 2024-05-01 DIAGNOSIS — M19.012 ARTHRITIS OF LEFT ACROMIOCLAVICULAR JOINT: ICD-10-CM

## 2024-05-01 DIAGNOSIS — G89.29 CHRONIC PAIN IN LEFT SHOULDER: ICD-10-CM

## 2024-05-01 DIAGNOSIS — M67.912 TENDINOPATHY OF LEFT ROTATOR CUFF: Primary | ICD-10-CM

## 2024-05-01 DIAGNOSIS — M25.512 CHRONIC PAIN IN LEFT SHOULDER: ICD-10-CM

## 2024-05-01 PROCEDURE — 20606 DRAIN/INJ JOINT/BURSA W/US: CPT | Mod: LT | Performed by: STUDENT IN AN ORGANIZED HEALTH CARE EDUCATION/TRAINING PROGRAM

## 2024-05-01 RX ORDER — TRIAMCINOLONE ACETONIDE 40 MG/ML
40 INJECTION, SUSPENSION INTRA-ARTICULAR; INTRAMUSCULAR ONCE
Status: COMPLETED | OUTPATIENT
Start: 2024-05-01 | End: 2024-05-01

## 2024-05-01 RX ADMIN — TRIAMCINOLONE ACETONIDE 40 MG: 40 INJECTION, SUSPENSION INTRA-ARTICULAR; INTRAMUSCULAR at 14:18

## 2024-05-01 NOTE — NURSING NOTE
Chief Complaint   Patient presents with    Consult For     Wants to do left shoulder cortisone injection today, has talked with Dr. Strange about this and feels it will work for his pain      Pre-visit Screening:  Immunizations:  up to date  Colonoscopy:  is up to date  Mammogram: na  Asthma Action Test/Plan:  na  PHQ9:  na  GAD7:  na  Questioned patient about current smoking habits Pt. has never smoked.  Ok to leave detailed message on voice mail for today's visit only yes, phone # [phone] (home)

## 2024-05-01 NOTE — PROGRESS NOTES
PROCEDURE    L Acromioclavicular Injection - Ultrasound Guided  The patient was informed of the risks and the benefits of the procedure and a written consent was signed.  The patient s L acromioclavicular joint was prepped with chlorhexidine in sterile fashion.   20 mg of triamcinolone suspension was drawn up into a 3 mL syringe with 1 mL of 1% lidocaine.  Injection was performed using sterile technique.  Under ultrasound guidance a 5/8-inch 25-gauge needle was used to enter the L acromioclavicular joint.  Needle placement was visualized and documented with ultrasound.  Needle placed in short axis to the probe.  Ultrasound visualization was necessary due to decreased joint space in the setting of osteoarthritis.  Images were permanently stored for the patient's record.  There were no complications. The patient tolerated the procedure well. There was negligible bleeding.   The patient was instructed to ice the shoulder upon leaving clinic and refrain from overuse over the next 3 days.   The patient was instructed to call or go to the emergency room with any unusual pain, swelling, redness, or if otherwise concerned.      Kevin Strange MD, [facility]

## 2024-07-15 ENCOUNTER — OFFICE VISIT (OUTPATIENT)
Dept: FAMILY MEDICINE | Facility: CLINIC | Age: 74
End: 2024-07-15

## 2024-07-15 VITALS
HEIGHT: 69 IN | BODY MASS INDEX: 23.25 KG/M2 | WEIGHT: 157 LBS | DIASTOLIC BLOOD PRESSURE: 58 MMHG | RESPIRATION RATE: 20 BRPM | TEMPERATURE: 98 F | SYSTOLIC BLOOD PRESSURE: 108 MMHG | OXYGEN SATURATION: 97 % | HEART RATE: 61 BPM

## 2024-07-15 DIAGNOSIS — I25.10 CORONARY ARTERY DISEASE INVOLVING NATIVE CORONARY ARTERY OF NATIVE HEART WITHOUT ANGINA PECTORIS: ICD-10-CM

## 2024-07-15 DIAGNOSIS — H26.9 CATARACT, UNSPECIFIED CATARACT TYPE, UNSPECIFIED LATERALITY: ICD-10-CM

## 2024-07-15 DIAGNOSIS — I25.5 CARDIOMYOPATHY, ISCHEMIC: ICD-10-CM

## 2024-07-15 DIAGNOSIS — Z01.818 PRE-OPERATIVE GENERAL PHYSICAL EXAMINATION: Primary | ICD-10-CM

## 2024-07-15 DIAGNOSIS — I48.0 PAF (PAROXYSMAL ATRIAL FIBRILLATION) (H): ICD-10-CM

## 2024-07-15 LAB
BUN SERPL-MCNC: 32 MG/DL (ref 7–25)
BUN/CREATININE RATIO: 24 (ref 6–32)
CALCIUM SERPL-MCNC: 9.1 MG/DL (ref 8.6–10.3)
CHLORIDE SERPLBLD-SCNC: 104.7 MMOL/L (ref 98–110)
CO2 SERPL-SCNC: 27.3 MMOL/L (ref 20–32)
CREAT SERPL-MCNC: 1.33 MG/DL (ref 0.6–1.3)
GFR SERPL CREATININE-BSD FRML MDRD: 57 ML/MIN/1.73M2
GLUCOSE SERPL-MCNC: 97 MG/DL (ref 60–99)
POTASSIUM SERPL-SCNC: 4.38 MMOL/L (ref 3.5–5.3)
SODIUM SERPL-SCNC: 135.5 MMOL/L (ref 135–146)

## 2024-07-15 PROCEDURE — 80048 BASIC METABOLIC PNL TOTAL CA: CPT | Performed by: STUDENT IN AN ORGANIZED HEALTH CARE EDUCATION/TRAINING PROGRAM

## 2024-07-15 PROCEDURE — 99214 OFFICE O/P EST MOD 30 MIN: CPT | Performed by: STUDENT IN AN ORGANIZED HEALTH CARE EDUCATION/TRAINING PROGRAM

## 2024-07-15 PROCEDURE — 36415 COLL VENOUS BLD VENIPUNCTURE: CPT | Performed by: STUDENT IN AN ORGANIZED HEALTH CARE EDUCATION/TRAINING PROGRAM

## 2024-07-15 RX ORDER — KETOROLAC TROMETHAMINE 5 MG/ML
SOLUTION OPHTHALMIC
COMMUNITY
Start: 2024-07-12 | End: 2024-09-11

## 2024-07-15 RX ORDER — PREDNISOLONE ACETATE 10 MG/ML
SUSPENSION/ DROPS OPHTHALMIC
COMMUNITY
Start: 2024-07-12 | End: 2024-09-11

## 2024-07-15 RX ORDER — MOXIFLOXACIN 5 MG/ML
SOLUTION/ DROPS OPHTHALMIC
COMMUNITY
Start: 2024-07-12 | End: 2024-09-11

## 2024-07-15 NOTE — PROGRESS NOTES
Preoperative Evaluation  [facility]  [address] [place]  [address]  [facility] [address]  Phone: [phone]  Fax: [phone]  Primary Provider: Eriberto Shetty MD  Pre-op Performing Provider: BARTOLO HERNANDEZ MD  Jul 15, 2024           7/15/2024   Surgical Information   What procedure is being done? Left eye cataract surgery   Facility or Hospital where procedure/surgery will be performed: [facility]   Who is doing the procedure / surgery? Dr. Hagen with [facility]   Date of surgery / procedure: 7/25/2024   Time of surgery / procedure: TBD   Where do you plan to recover after surgery? at home with family      Fax number for surgical facility: [phone]    Assessment & Plan     The proposed surgical procedure is considered LOW risk.      ICD-10-CM    1. Pre-operative general physical examination  Z01.818       2. Cataract, unspecified cataract type, unspecified laterality  H26.9       3. Coronary artery disease involving native coronary artery of native heart without angina pectoris  I25.10       4. Cardiomyopathy, ischemic  I25.5 Basic Metabolic Panel (BFP)      5. PAF (paroxysmal atrial fibrillation) (H)  I48.0          Cardiac hx noted, recent Cardiology notes reviewed. No recent sx, exercises daily for ~1 hour without any exertional cardiac sx. No further evaluation indicated prior to cataract procedure.      - No identified additional risk factors other than previously addressed    Antiplatelet or Anticoagulation Medication Instructions   - apixaban (Eliquis), edoxaban (Savaysa), rivaroxaban (Xarelto): Bleeding risk is low for this procedure AND CrCl (>=) 50 mL/min. DO NOT TAKE 1 day before surgery.      Additional Medication Instructions  Patient Instructions   Last dose of eliquis 7/23, restart 7/26 unless surgeon advises otherwise     On morning of surgery, only take Lipitor with sip of water    Blood work today    Call with any concerns       Recommendation  Approval given to proceed with proposed procedure, without further diagnostic evaluation.    30 minutes spent on the date of the encounter doing chart review, history and exam, documentation and further activities per the note.    Kevin Strange MD, [facility]        Subjective   Sly is a 73 year old, presenting for the following:  Pre-Op Exam (DOS 7/25/24, left eye cataract surgery being done by Dr. Hagen with [facility], surgery being held at [facility] )        HPI related to upcoming procedure: L cataract, otherwise feeling well. Exercises daily for ~1 hour without any exertional cardiac sx.     1. Yes - Have you ever had a heart attack or stroke? MI in 2004  2. Yes - Have you ever had surgery on your heart or blood vessels, such as a stent, coronary (heart) bypass, or surgery on an artery in the head, neck, heart, or legs? Coronary stent x 2  3. No - Do you have chest pain when you are physically active?  4. Yes - Do you have a history of heart failure? Cardiomyopathy post-MI, most recent EF 47%. Most recent Cardiology notes reviewed.  5. No - Do you currently have a cold, bronchitis, or symptoms of other respiratory (head and chest) infections?  6. No - Do you have a cough, shortness of breath, or wheezing?  7. No - Do you or anyone in your family have a history of blood clots?  8. No - Do you or anyone in your family have a serious bleeding problem, such as long-lasting bleeding after surgeries or cuts?  9. No - Have you ever had anemia or been told to take iron pills?  10. No - Have you had any abnormal blood loss such as black, tarry or bloody stools, or abnormal vaginal bleeding?  11. No - Have you ever had a blood transfusion?  12. Yes - Are you willing to have a blood transfusion if it is medically needed before, during, or after your surgery?  13. No - Have you or anyone in your family ever had problems with anesthesia (sedation for  surgery)?  14. No - Do you have sleep apnea, excessive snoring, or daytime drowsiness?   15. No - Do you have any artifical heart valves or other implanted medical devices, such as a pacemaker, defibrillator, or continuous glucose monitor?  16. No - Do you have any artifical joints?  17. Unsure - Are you allergic to latex? No definite allergy but avoids due to skin sensitivity to band aids (more likely adhesive sensitivity)    Health Care Directive  Patient does not have a Health Care Directive or Living Will: Patient states has Advance Directive and will bring in a copy to clinic.    Status of Chronic Conditions:  See problem list for active medical problems.  Problems all longstanding and stable, except as noted/documented.  See ROS for pertinent symptoms related to these conditions.    Patient Active Problem List    Diagnosis Date Noted    PAF (paroxysmal atrial fibrillation) (H) 10/12/2022     Priority: Medium    SSS (sick sinus syndrome) (H) 10/12/2022     Priority: Medium    ST elevation myocardial infarction involving left anterior descending (LAD) coronary artery (H) 08/22/2016     Priority: Medium    Coronary artery disease involving native coronary artery of native heart without angina pectoris      Priority: Medium    Cardiomyopathy, ischemic      Priority: Medium    Pure hypercholesterolemia      Priority: Medium     Diagnosis updated by automated process. Provider to review and confirm.      Non-rheumatic mitral regurgitation      Priority: Medium     mitral regurgitation  Problem list name updated by automated process. Provider to review      ACP (advance care planning) 10/18/2013     Priority: Medium             CATARACT NEC Right  12/18/2006     Priority: Medium    Family history of diabetes mellitus 08/06/2003     Priority: Medium    Sciatica 03/22/2001     Priority: Medium      Past Medical History:   Diagnosis Date    Cardiomyopathy, ischemic     Coronary artery disease     Family history of  diabetes mellitus     Father    Hyperlipidaemia     Mitral valve disorders(424.0)     mitral regurgitation    Old myocardial infarction 5/04    anterior     Past Surgical History:   Procedure Laterality Date    CATARACT EXTRACTION W/ INTRAOCULAR LENS IMPLANT Right     HC KNEE SCOPE, DIAGNOSTIC  08/01/2011    Right knee arthroscopy with partial medial meniscectomy    HEART CATH, ANGIOPLASTY  05/01/2004    intracoronary stent placement of mid LAD May 2004,    HEART CATH, ANGIOPLASTY  06/01/2004    elective staged stent of OM     [facility] EYE EXAM ESTABLISHED PT  01/01/2003    [facility] REPAIR DETACH RETINA,SCLERAL BUCKLE  01/01/1967    Right eye     Current Outpatient Medications   Medication Sig Dispense Refill    apixaban ANTICOAGULANT (ELIQUIS ANTICOAGULANT) 5 MG tablet Take 1 tablet (5 mg) by mouth 2 times daily 180 tablet 3    atorvastatin (LIPITOR) 20 MG tablet Take 1 tablet (20 mg) by mouth daily 90 tablet 4    gabapentin (NEURONTIN) 300 MG capsule Take 300 mg by mouth At Bedtime      hydrocortisone (PROCTO-MED HC) 2.5 % cream apply to the anus twice daily as needed 30 g 0    lisinopril (ZESTRIL) 2.5 MG tablet Take 1 tablet (2.5 mg) by mouth daily 90 tablet 3    nitroGLYcerin (NITROSTAT) 0.4 MG sublingual tablet For chest pain place 1 tablet under the tongue every 5 minutes for 3 doses. If symptoms persist 5 minutes after 1st dose call 911. 25 tablet 1    Psyllium (METAMUCIL FIBER PO) Take by mouth daily       ketorolac (ACULAR) 0.5 % ophthalmic solution  (Patient not taking: Reported on 7/15/2024)      latanoprost (XALATAN) 0.005 % ophthalmic solution Place 1 drop into the right eye At Bedtime  (Patient not taking: Reported on 7/15/2024)  5    moxifloxacin (VIGAMOX) 0.5 % ophthalmic solution  (Patient not taking: Reported on 7/15/2024)      prednisoLONE acetate (PRED FORTE) 1 % ophthalmic suspension  (Patient not taking: Reported on 7/15/2024)      valACYclovir (VALTREX) 1000 mg tablet Take 1 tablet (1,000 mg) by mouth 2  "times daily for 10 days 20 tablet 0       Allergies   Allergen Reactions    Cats     No Known Allergies         Social History     Tobacco Use    Smoking status: Never    Smokeless tobacco: Never   Substance Use Topics    Alcohol use: No     Alcohol/week: 0.0 standard drinks of alcohol     Family History   Problem Relation Age of Onset    Cerebrovascular Disease Father     Coronary Artery Disease Father         CABG    Diabetes Father     C.A.D. Father     Substance Abuse Sister     Cancer - colorectal No family hx of     Prostate Cancer No family hx of     Anesthesia Reaction No family hx of     Bleeding Disorder No family hx of     Deep Vein Thrombosis (DVT) No family hx of      History   Drug Use No             Review of Systems  12 point ROS performed and negative for new concerns except as mentioned above     Objective    /58 (BP Location: Left arm, Patient Position: Sitting, Cuff Size: Adult Large)   Pulse 61   Temp 98  F (36.7  C) (Temporal)   Resp 20   Ht 1.753 m (5' 9\")   Wt 71.2 kg (157 lb)   SpO2 97%   BMI 23.18 kg/m     Estimated body mass index is 23.18 kg/m  as calculated from the following:    Height as of this encounter: 1.753 m (5' 9\").    Weight as of this encounter: 71.2 kg (157 lb).  Physical Exam  GENERAL: alert and no distress  EYES: Eyes grossly normal to inspection, PERRL and conjunctivae and sclerae normal  NECK: no adenopathy, no asymmetry, masses, or scars  RESP: lungs clear to auscultation - no rales, rhonchi or wheezes  CV: regular rate and rhythm, normal S1 S2, no S3 or S4, no murmur, click or rub, no peripheral edema  MS: no gross musculoskeletal defects noted, no edema  SKIN: no suspicious lesions or rashes  NEURO: Normal strength and tone, mentation intact and speech normal  PSYCH: mentation appears normal, affect normal/bright    Recent Labs   Lab Test 04/25/24  1335 09/27/23  0737   HGB  --  13.3   .5  --    POTASSIUM 4.75  --    CR 1.14  --     "     Diagnostics  Recent Results (from the past 24 hour(s))   Basic Metabolic Panel (BFP)    Collection Time: 07/15/24 12:00 AM   Result Value Ref Range    Carbon Dioxide 27.3 20 - 32 mmol/L    Creatinine 1.33 (A) 0.60 - 1.30 mg/dL    Glucose 97 60 - 99 mg/dL    Sodium 135.5 135 - 146 mmol/L    Potassium 4.38 3.5 - 5.3 mmol/L    Chloride 104.7 98 - 110 mmol/L    Urea Nitrogen 32 (A) 7 - 25 mg/dL    Calcium 9.1 8.6 - 10.3 mg/dL    BUN/Creatinine Ratio 24 6 - 32    GFR Estimate 57 (A) >60 ml/min/1.73m2      No EKG required for low risk surgery (cataract, skin procedure, breast biopsy, etc).    Revised Cardiac Risk Index (RCRI)  The patient has the following serious cardiovascular risks for perioperative complications:   - Coronary Artery Disease (MI, positive stress test, angina, Qs on EKG) = 1 point     RCRI Interpretation: 1 point: Class II (low risk - 0.9% complication rate)         Signed Electronically by: BARTOLO HERNANDEZ MD  Copy of this evaluation report is provided to requesting physician.

## 2024-07-15 NOTE — PATIENT INSTRUCTIONS
Last dose of eliquis 7/23, restart 7/26 unless surgeon advises otherwise     On morning of surgery, only take Lipitor with sip of water    Blood work today    Call with any concerns

## 2024-07-15 NOTE — NURSING NOTE
Chief Complaint   Patient presents with    Pre-Op Exam     DOS 7/25/24, left eye cataract surgery being done by Dr. Hagen with [facility], surgery being held at [facility]

## 2024-09-09 ENCOUNTER — LAB (OUTPATIENT)
Dept: LAB | Facility: CLINIC | Age: 74
End: 2024-09-09
Payer: COMMERCIAL

## 2024-09-09 DIAGNOSIS — I25.10 CORONARY ARTERY DISEASE INVOLVING NATIVE CORONARY ARTERY OF NATIVE HEART WITHOUT ANGINA PECTORIS: Chronic | ICD-10-CM

## 2024-09-09 LAB
ALT SERPL W P-5'-P-CCNC: 22 U/L (ref 0–70)
ANION GAP SERPL CALCULATED.3IONS-SCNC: 9 MMOL/L (ref 7–15)
BUN SERPL-MCNC: 19.6 MG/DL (ref 8–23)
CALCIUM SERPL-MCNC: 8.5 MG/DL (ref 8.8–10.4)
CHLORIDE SERPL-SCNC: 107 MMOL/L (ref 98–107)
CHOLEST SERPL-MCNC: 127 MG/DL
CREAT SERPL-MCNC: 1.06 MG/DL (ref 0.67–1.17)
EGFRCR SERPLBLD CKD-EPI 2021: 75 ML/MIN/1.73M2
FASTING STATUS PATIENT QL REPORTED: YES
GLUCOSE SERPL-MCNC: 84 MG/DL (ref 70–99)
HCO3 SERPL-SCNC: 25 MMOL/L (ref 22–29)
HDLC SERPL-MCNC: 57 MG/DL
LDLC SERPL CALC-MCNC: 61 MG/DL
NONHDLC SERPL-MCNC: 70 MG/DL
POTASSIUM SERPL-SCNC: 4 MMOL/L (ref 3.4–5.3)
SODIUM SERPL-SCNC: 141 MMOL/L (ref 135–145)
TRIGL SERPL-MCNC: 43 MG/DL

## 2024-09-09 PROCEDURE — 84460 ALANINE AMINO (ALT) (SGPT): CPT | Performed by: INTERNAL MEDICINE

## 2024-09-09 PROCEDURE — 80061 LIPID PANEL: CPT | Performed by: INTERNAL MEDICINE

## 2024-09-09 PROCEDURE — 80048 BASIC METABOLIC PNL TOTAL CA: CPT | Performed by: INTERNAL MEDICINE

## 2024-09-09 PROCEDURE — 36415 COLL VENOUS BLD VENIPUNCTURE: CPT | Performed by: INTERNAL MEDICINE

## 2024-09-11 ENCOUNTER — OFFICE VISIT (OUTPATIENT)
Dept: CARDIOLOGY | Facility: CLINIC | Age: 74
End: 2024-09-11
Attending: INTERNAL MEDICINE
Payer: COMMERCIAL

## 2024-09-11 VITALS
SYSTOLIC BLOOD PRESSURE: 142 MMHG | BODY MASS INDEX: 23.62 KG/M2 | WEIGHT: 159.5 LBS | HEIGHT: 69 IN | DIASTOLIC BLOOD PRESSURE: 72 MMHG | HEART RATE: 56 BPM

## 2024-09-11 DIAGNOSIS — I49.5 SSS (SICK SINUS SYNDROME) (H): ICD-10-CM

## 2024-09-11 DIAGNOSIS — I34.0 NON-RHEUMATIC MITRAL REGURGITATION: ICD-10-CM

## 2024-09-11 DIAGNOSIS — I25.5 CARDIOMYOPATHY, ISCHEMIC: ICD-10-CM

## 2024-09-11 DIAGNOSIS — I25.10 CORONARY ARTERY DISEASE INVOLVING NATIVE CORONARY ARTERY OF NATIVE HEART WITHOUT ANGINA PECTORIS: Chronic | ICD-10-CM

## 2024-09-11 DIAGNOSIS — I48.0 PAF (PAROXYSMAL ATRIAL FIBRILLATION) (H): ICD-10-CM

## 2024-09-11 DIAGNOSIS — E78.00 PURE HYPERCHOLESTEROLEMIA: ICD-10-CM

## 2024-09-11 PROCEDURE — 99214 OFFICE O/P EST MOD 30 MIN: CPT | Performed by: INTERNAL MEDICINE

## 2024-09-11 NOTE — PROGRESS NOTES
HPI and Plan:   Mr. Morton is a very nice 74-year-old gentleman who is the model cardiac patient.  I met him in 2004 when he presented with a large anterior wall myocardial infarction, we stented his mid left anterior descending artery.  In a staged fashion, we stented his obtuse marginal.  Initial ejection fraction was 35%-40% with 2 to 3+ mitral regurgitation.  2023 echocardiograms have been in the 45 - 50% range with mitral regurgitation now down to mild.     In 2022 he was diagnosed with paroxysmal A-fib on Eliquis and sick sinus syndrome with significant bradycardia.  An ER visit in June 2023 for near syncope found hypotension and bradycardia.   A 10-day Zio patch showed sinus rhythm with an average heart rate of 52 going down as low as 32 during the nighttime.  He had 1 run of nonsustained ventricular tachycardia 5 runs of SVT longest being 10 beats and no A-fib.     His most recent stress test was a stress echocardiogram 09/2021, at which time he was able to exercise 10 minutes of standard Herminio protocol without symptoms, EKG changes.  He had a baseline abnormal wall motion abnormality without any stress-induced wall motion abnormality.  Baseline ejection fraction was estimated to be 45%-50%, consistent with an old anterior wall myocardial infarction without significant ischemia.     He returns to clinic stating he thinks he is doing great.  He recently had cataract surgery so he is not running right now.  Unfortunate this is resulted in a little bit of a weight gain however body mass index is still 23.6 with a clothes on.  Normally, he runs 7 days a week.  On nice days he may go for a 1 hour bike ride instead.  3 days a week he lifts weights and the other 4 days does core exercises.  He notes no problems or symptoms with any of this.       He has occasional episodes of orthostasis but otherwise no further episodes of lightheadedness and dizziness.  He called last February with some twitches in his chest.  We  did a 6-day Zio patch which showed an average heart rate of 49 ranging from 34-1 50.  He had 7 runs of SVT fastest 5 beats at 150 longest 12 beats at 107.  He states he has done fine since then     He notes no side effects or problems with his current medical regiment.        ASSESSMENT AND PLAN:  Dustin appears to be doing quite well from a cardiac standpoint.  He does not appear to be having any ischemia and this is supported by his stress test of 2021     He does have an ischemic cardiomyopathy, but clearly shows no signs of heart failure.  2023 echocardiogram shows no deterioration of his LV function or mitral regurgitation.     At this time his bradycardia is asymptomatic.  I told him I suspect sometime that he may need a pacemaker for chronotropic incompetence.  At this time he is functioning at a very high level.  Obviously we cannot use a beta-blocker.  Thyroid function tests were normal in 2022     He is tolerating his Eliquis quite well.  He states fortunately its not significantly expensive and he is able to afford it.  He does note forearm ecchymoses which we talked about is a combination of a lifetime of sun exposure and damaging his underlying blood vessels as well as his Eliquis     I congratulated him on his healthy lifestyle.  I encouraged him to continue to do so.     Fasting lipid profile has backslid some from last year however still excellent with total cholesterol 127, HDL 57, LDL 61 and triglycerides of 43.  Last year his LDL was 54.  I suspect he will get it back down there once he resumes his usual workouts.     If he has significant orthostasis I may discontinue his 2.5 of lisinopril.  At this time his blood pressure is very well controlled.  It is beneficial in light of his ischemic cardiomyopathy and mitral regurgitation.     I will have him follow-up with my SELENA in 1 year.  We talked about the fact that I was retiring.  He gave me a card.  He tearfully thanked me for changing his life for  the better.  I have recommended that he follow-up with Dr. Oleg Mullins or Dr. Aditi STARK down the road     Thank you for allowing me to participate in his care.     David Mensah MD, [facility]       Today's clinic visit entailed:  Review of the result(s) of each unique test - echocardiogram, stress echo, lab work  Ordering of each unique test  Prescription drug management  30 minutes spent by me on the date of the encounter doing chart review, history and exam, documentation and further activities per the note  Provider  Link to [facility] Help Grid           No orders of the defined types were placed in this encounter.      No orders of the defined types were placed in this encounter.      Medications Discontinued During This Encounter   Medication Reason    ketorolac (ACULAR) 0.5 % ophthalmic solution     moxifloxacin (VIGAMOX) 0.5 % ophthalmic solution     prednisoLONE acetate (PRED FORTE) 1 % ophthalmic suspension          Encounter Diagnoses   Name Primary?    Coronary artery disease involving native coronary artery of native heart without angina pectoris     Cardiomyopathy, ischemic     Non-rheumatic mitral regurgitation     Pure hypercholesterolemia     PAF (paroxysmal atrial fibrillation) (H)     SSS (sick sinus syndrome) (H)        CURRENT MEDICATIONS:  Current Outpatient Medications   Medication Sig Dispense Refill    apixaban ANTICOAGULANT (ELIQUIS ANTICOAGULANT) 5 MG tablet Take 1 tablet (5 mg) by mouth 2 times daily 180 tablet 3    atorvastatin (LIPITOR) 20 MG tablet Take 1 tablet (20 mg) by mouth daily 90 tablet 4    gabapentin (NEURONTIN) 300 MG capsule Take 300 mg by mouth At Bedtime      hydrocortisone (PROCTO-MED HC) 2.5 % cream apply to the anus twice daily as needed 30 g 0    latanoprost (XALATAN) 0.005 % ophthalmic solution Place 1 drop into the right eye at bedtime.  5    lisinopril (ZESTRIL) 2.5 MG tablet Take 1 tablet (2.5 mg) by mouth daily 90 tablet 3    nitroGLYcerin (NITROSTAT) 0.4 MG sublingual  tablet For chest pain place 1 tablet under the tongue every 5 minutes for 3 doses. If symptoms persist 5 minutes after 1st dose call 911. 25 tablet 1    Psyllium (METAMUCIL FIBER PO) Take by mouth daily       valACYclovir (VALTREX) 1000 mg tablet Take 1 tablet (1,000 mg) by mouth 2 times daily for 10 days 20 tablet 0       ALLERGIES     Allergies   Allergen Reactions    Cats     No Known Allergies        PAST MEDICAL HISTORY:  Past Medical History:   Diagnosis Date    Cardiomyopathy, ischemic     Coronary artery disease     Family history of diabetes mellitus     Father    Hyperlipidaemia     Mitral valve disorders(424.0)     mitral regurgitation    Old myocardial infarction 5/04    anterior       PAST SURGICAL HISTORY:  Past Surgical History:   Procedure Laterality Date    CATARACT EXTRACTION W/ INTRAOCULAR LENS IMPLANT Right     HC KNEE SCOPE, DIAGNOSTIC  08/01/2011    Right knee arthroscopy with partial medial meniscectomy    HEART CATH, ANGIOPLASTY  05/01/2004    intracoronary stent placement of mid LAD May 2004,    HEART CATH, ANGIOPLASTY  06/01/2004    elective staged stent of OM     [facility] EYE EXAM ESTABLISHED PT  01/01/2003    [facility] REPAIR DETACH RETINA,SCLERAL BUCKLE  01/01/1967    Right eye       FAMILY HISTORY:  Family History   Problem Relation Age of Onset    Cerebrovascular Disease Father     Coronary Artery Disease Father         CABG    Diabetes Father     C.A.D. Father     Substance Abuse Sister     Cancer - colorectal No family hx of     Prostate Cancer No family hx of     Anesthesia Reaction No family hx of     Bleeding Disorder No family hx of     Deep Vein Thrombosis (DVT) No family hx of        SOCIAL HISTORY:  Social History     Socioeconomic History    Marital status:      Spouse name: Yoni    Number of children: 2    Years of education: 18    Highest education level: None   Occupational History    Occupation: Teacher     Employer: [employer]     Comment: retired   Tobacco  "Use    Smoking status: Never    Smokeless tobacco: Never   Substance and Sexual Activity    Alcohol use: No     Alcohol/week: 0.0 standard drinks of alcohol    Drug use: No    Sexual activity: Yes     Partners: Female   Other Topics Concern    Caffeine Concern Yes     Comment: 2-4 cups daily    Special Diet Yes     Comment: low fat, low cholesterol    Exercise Yes     Comment: 90 mins daily    Seat Belt Yes       Review of Systems:  Skin:  not assessed       Eyes:  not assessed      ENT:  not assessed      Respiratory:  Negative       Cardiovascular:    Positive for;lightheadedness;dizziness lightheadedness/dizziness sometimes when getting up  Gastroenterology: not assessed      Genitourinary:  not assessed      Musculoskeletal:  not assessed      Neurologic:  not assessed      Psychiatric:  not assessed      Heme/Lymph/Imm:  not assessed      Endocrine:  not assessed        Physical Exam:  Vitals: BP (!) 142/72 (BP Location: Right arm, Patient Position: Sitting, Cuff Size: Adult Regular)   Pulse 56   Ht 1.753 m (5' 9\")   Wt 72.3 kg (159 lb 8 oz)   BMI 23.55 kg/m      Constitutional:  cooperative, alert and oriented, well developed, well nourished, in no acute distress thin Fit appearing    Skin:  warm and dry to the touch, no apparent skin lesions or masses noted          Head:  normocephalic, no masses or lesions        Eyes:  pupils equal and round, conjunctivae and lids unremarkable, sclera white, no xanthalasma, EOMS intact, no nystagmus        Lymph:      ENT:  no pallor or cyanosis, dentition good        Neck:  carotid pulses are full and equal bilaterally;no carotid bruit        Respiratory:  normal breath sounds, clear to auscultation, normal A-P diameter, normal symmetry, normal respiratory excursion, no use of accessory muscles         Cardiac: regular rhythm;no murmurs, gallops or rubs detected;normal S1 and S2                pulses full and equal                                        GI:       "     Extremities and Muscular Skeletal:  no edema;no spinal abnormalities noted;normal muscle strength and tone              Neurological:  no gross motor deficits        Psych:  affect appropriate, oriented to time, person and place        CC  David Mensah MD  [address]  [place] [address]

## 2024-09-11 NOTE — PATIENT INSTRUCTIONS
It was a pleasure seeing you today and thank you for allowing me to be a part of your health care team.  Should   you have any questions regarding your visit or future needs please feel free to reach out to my care team for assistance.      Thank you, Dr. David Mensah        **Nursing: [phone]       **Scheduling: [phone]

## 2024-09-11 NOTE — LETTER
9/11/2024    Eriberto Shetty MD  [address], [place]  [facility] [address]    RE: Sly Morton       Dear Colleague,     I had the pleasure of seeing Sly Morton in the [facility].  HPI and Plan:   Mr. Morton is a very nice 74-year-old gentleman who is the model cardiac patient.  I met him in 2004 when he presented with a large anterior wall myocardial infarction, we stented his mid left anterior descending artery.  In a staged fashion, we stented his obtuse marginal.  Initial ejection fraction was 35%-40% with 2 to 3+ mitral regurgitation.  2023 echocardiograms have been in the 45 - 50% range with mitral regurgitation now down to mild.     In 2022 he was diagnosed with paroxysmal A-fib on Eliquis and sick sinus syndrome with significant bradycardia.  An ER visit in June 2023 for near syncope found hypotension and bradycardia.   A 10-day Zio patch showed sinus rhythm with an average heart rate of 52 going down as low as 32 during the nighttime.  He had 1 run of nonsustained ventricular tachycardia 5 runs of SVT longest being 10 beats and no A-fib.     His most recent stress test was a stress echocardiogram 09/2021, at which time he was able to exercise 10 minutes of standard Herminio protocol without symptoms, EKG changes.  He had a baseline abnormal wall motion abnormality without any stress-induced wall motion abnormality.  Baseline ejection fraction was estimated to be 45%-50%, consistent with an old anterior wall myocardial infarction without significant ischemia.     He returns to clinic stating he thinks he is doing great.  He recently had cataract surgery so he is not running right now.  Unfortunate this is resulted in a little bit of a weight gain however body mass index is still 23.6 with a clothes on.  Normally, he runs 7 days a week.  On nice days he may go for a 1 hour bike ride instead.  3 days a week he lifts weights and the other 4 days does core exercises.  He notes  no problems or symptoms with any of this.       He has occasional episodes of orthostasis but otherwise no further episodes of lightheadedness and dizziness.  He called last February with some twitches in his chest.  We did a 6-day Zio patch which showed an average heart rate of 49 ranging from 34-1 50.  He had 7 runs of SVT fastest 5 beats at 150 longest 12 beats at 107.  He states he has done fine since then     He notes no side effects or problems with his current medical regiment.        ASSESSMENT AND PLAN:  Dustin appears to be doing quite well from a cardiac standpoint.  He does not appear to be having any ischemia and this is supported by his stress test of 2021     He does have an ischemic cardiomyopathy, but clearly shows no signs of heart failure.  2023 echocardiogram shows no deterioration of his LV function or mitral regurgitation.     At this time his bradycardia is asymptomatic.  I told him I suspect sometime that he may need a pacemaker for chronotropic incompetence.  At this time he is functioning at a very high level.  Obviously we cannot use a beta-blocker.  Thyroid function tests were normal in 2022     He is tolerating his Eliquis quite well.  He states fortunately its not significantly expensive and he is able to afford it.  He does note forearm ecchymoses which we talked about is a combination of a lifetime of sun exposure and damaging his underlying blood vessels as well as his Eliquis     I congratulated him on his healthy lifestyle.  I encouraged him to continue to do so.     Fasting lipid profile has backslid some from last year however still excellent with total cholesterol 127, HDL 57, LDL 61 and triglycerides of 43.  Last year his LDL was 54.  I suspect he will get it back down there once he resumes his usual workouts.     If he has significant orthostasis I may discontinue his 2.5 of lisinopril.  At this time his blood pressure is very well controlled.  It is beneficial in light of his  ischemic cardiomyopathy and mitral regurgitation.     I will have him follow-up with my SELENA in 1 year.  We talked about the fact that I was retiring.  He gave me a card.  He tearfully thanked me for changing his life for the better.  I have recommended that he follow-up with Dr. Oleg Mullins or Dr. Aditi STARK down the road     Thank you for allowing me to participate in his care.     David Mensah MD, [facility]       Today's clinic visit entailed:  Review of the result(s) of each unique test - echocardiogram, stress echo, lab work  Ordering of each unique test  Prescription drug management  30 minutes spent by me on the date of the encounter doing chart review, history and exam, documentation and further activities per the note  Provider  Link to MDM Help Grid           No orders of the defined types were placed in this encounter.      No orders of the defined types were placed in this encounter.      Medications Discontinued During This Encounter   Medication Reason     ketorolac (ACULAR) 0.5 % ophthalmic solution      moxifloxacin (VIGAMOX) 0.5 % ophthalmic solution      prednisoLONE acetate (PRED FORTE) 1 % ophthalmic suspension          Encounter Diagnoses   Name Primary?     Coronary artery disease involving native coronary artery of native heart without angina pectoris      Cardiomyopathy, ischemic      Non-rheumatic mitral regurgitation      Pure hypercholesterolemia      PAF (paroxysmal atrial fibrillation) (H)      SSS (sick sinus syndrome) (H)        CURRENT MEDICATIONS:  Current Outpatient Medications   Medication Sig Dispense Refill     apixaban ANTICOAGULANT (ELIQUIS ANTICOAGULANT) 5 MG tablet Take 1 tablet (5 mg) by mouth 2 times daily 180 tablet 3     atorvastatin (LIPITOR) 20 MG tablet Take 1 tablet (20 mg) by mouth daily 90 tablet 4     gabapentin (NEURONTIN) 300 MG capsule Take 300 mg by mouth At Bedtime       hydrocortisone (PROCTO-MED HC) 2.5 % cream apply to the anus twice daily as needed 30 g 0      latanoprost (XALATAN) 0.005 % ophthalmic solution Place 1 drop into the right eye at bedtime.  5     lisinopril (ZESTRIL) 2.5 MG tablet Take 1 tablet (2.5 mg) by mouth daily 90 tablet 3     nitroGLYcerin (NITROSTAT) 0.4 MG sublingual tablet For chest pain place 1 tablet under the tongue every 5 minutes for 3 doses. If symptoms persist 5 minutes after 1st dose call 911. 25 tablet 1     Psyllium (METAMUCIL FIBER PO) Take by mouth daily        valACYclovir (VALTREX) 1000 mg tablet Take 1 tablet (1,000 mg) by mouth 2 times daily for 10 days 20 tablet 0       ALLERGIES     Allergies   Allergen Reactions     Cats      No Known Allergies        PAST MEDICAL HISTORY:  Past Medical History:   Diagnosis Date     Cardiomyopathy, ischemic      Coronary artery disease      Family history of diabetes mellitus     Father     Hyperlipidaemia      Mitral valve disorders(424.0)     mitral regurgitation     Old myocardial infarction 5/04    anterior       PAST SURGICAL HISTORY:  Past Surgical History:   Procedure Laterality Date     CATARACT EXTRACTION W/ INTRAOCULAR LENS IMPLANT Right      HC KNEE SCOPE, DIAGNOSTIC  08/01/2011    Right knee arthroscopy with partial medial meniscectomy     HEART CATH, ANGIOPLASTY  05/01/2004    intracoronary stent placement of mid LAD May 2004,     HEART CATH, ANGIOPLASTY  06/01/2004    elective staged stent of OM      Z EYE EXAM ESTABLISHED PT  01/01/2003     [facility] [place] REPAIR DETACH RETINA,SCLERAL BUCKLE  01/01/1967    Right eye       FAMILY HISTORY:  Family History   Problem Relation Age of Onset     Cerebrovascular Disease Father      Coronary Artery Disease Father         CABG     Diabetes Father      C.A.D. Father      Substance Abuse Sister      Cancer - colorectal No family hx of      Prostate Cancer No family hx of      Anesthesia Reaction No family hx of      Bleeding Disorder No family hx of      Deep Vein Thrombosis (DVT) No family hx of        SOCIAL HISTORY:  Social History  "    Socioeconomic History     Marital status:      Spouse name: Yoni     Number of children: 2     Years of education: 18     Highest education level: None   Occupational History     Occupation: Teacher     Employer: [employer]     Comment: retired   Tobacco Use     Smoking status: Never     Smokeless tobacco: Never   Substance and Sexual Activity     Alcohol use: No     Alcohol/week: 0.0 standard drinks of alcohol     Drug use: No     Sexual activity: Yes     Partners: Female   Other Topics Concern     Caffeine Concern Yes     Comment: 2-4 cups daily     Special Diet Yes     Comment: low fat, low cholesterol     Exercise Yes     Comment: 90 mins daily     Seat Belt Yes       Review of Systems:  Skin:  not assessed       Eyes:  not assessed      ENT:  not assessed      Respiratory:  Negative       Cardiovascular:    Positive for;lightheadedness;dizziness lightheadedness/dizziness sometimes when getting up  Gastroenterology: not assessed      Genitourinary:  not assessed      Musculoskeletal:  not assessed      Neurologic:  not assessed      Psychiatric:  not assessed      Heme/Lymph/Imm:  not assessed      Endocrine:  not assessed        Physical Exam:  Vitals: BP (!) 142/72 (BP Location: Right arm, Patient Position: Sitting, Cuff Size: Adult Regular)   Pulse 56   Ht 1.753 m (5' 9\")   Wt 72.3 kg (159 lb 8 oz)   BMI 23.55 kg/m      Constitutional:  cooperative, alert and oriented, well developed, well nourished, in no acute distress thin Fit appearing    Skin:  warm and dry to the touch, no apparent skin lesions or masses noted          Head:  normocephalic, no masses or lesions        Eyes:  pupils equal and round, conjunctivae and lids unremarkable, sclera white, no xanthalasma, EOMS intact, no nystagmus        Lymph:      ENT:  no pallor or cyanosis, dentition good        Neck:  carotid pulses are full and equal bilaterally;no carotid bruit        Respiratory:  normal breath sounds, " clear to auscultation, normal A-P diameter, normal symmetry, normal respiratory excursion, no use of accessory muscles         Cardiac: regular rhythm;no murmurs, gallops or rubs detected;normal S1 and S2                pulses full and equal                                        GI:           Extremities and Muscular Skeletal:  no edema;no spinal abnormalities noted;normal muscle strength and tone              Neurological:  no gross motor deficits        Psych:  affect appropriate, oriented to time, person and place        CC  David Mensah MD  [address]  [place] [address]                Thank you for allowing me to participate in the care of your patient.      Sincerely,     David Mensah MD     [facility]  cc:   David Mensah MD  [address]  [place] [address]

## 2024-09-16 DIAGNOSIS — I25.10 CORONARY ARTERY DISEASE INVOLVING NATIVE CORONARY ARTERY OF NATIVE HEART WITHOUT ANGINA PECTORIS: ICD-10-CM

## 2024-09-16 RX ORDER — LISINOPRIL 2.5 MG/1
2.5 TABLET ORAL DAILY
Qty: 90 TABLET | Refills: 4 | Status: SHIPPED | OUTPATIENT
Start: 2024-09-16

## 2024-10-22 NOTE — PROGRESS NOTES
"Assessment & Plan   Problem List Items Addressed This Visit    None  Visit Diagnoses       Skin lesion    -  Primary           1. Skin lesion (Primary)  This seems to be improving. Instructed to use warm packs and otc antibiotic ointment, he will be seeing his dermatologist in a few weeks, if this has not gone away, he should discuss with them    2. Viral URI  Viral, continue to treat symptoms.            FUTURE APPOINTMENTS:       - Follow-up visit as needed. We manage his chronic medical care.    No follow-ups on file.    Doris Charles MD  [facility]    Subjective     Nursing Notes:   Shira Barbosa, [facility]  10/23/2024 12:32 PM  Signed  Chief Complaint   Patient presents with    Derm Problem     Small red spot on left wrist, he first noticed this 2 weeks ago, at times it becomes \"a pussy pimple\", at first when he would rub it on something it would become irritated     Nasal Congestion     Sinus congestion, discolored mucous      Pre-visit Screening:  Immunizations:  up to date  Colonoscopy:  is up to date  Mammogram: NA  Asthma Action Test/Plan:  nA  PHQ9:  nA  GAD7:  nA  Questioned patient about current smoking habits Pt. has never smoked.  Ok to leave detailed message on voice mail for today's visit only Yes, phone # [phone]       Sly Morton is a 74 year old male who presents to clinic today for the following health issues   HPI     Here for skin lesion on the left wrist. Has had this somewhere more than 2 weeks but less than 4 weeks. It started as a pimple but then seemed to pop and deflate. Non tender, it hasn't worsened but it's still there.  He has a dermatology apt November 20th.     Also has had a uri, sinus congestion, green mucous when he blew his nose. This started 2-3 days ago. No fevers and no shortness of breath.        Review of Systems   Constitutional, HEENT, cardiovascular, pulmonary, gi and gu systems are negative, except as otherwise noted.      Objective    BP " 114/66 (BP Location: Right arm, Patient Position: Sitting, Cuff Size: Adult Large)   Pulse 56   Temp 97.9  F (36.6  C) (Temporal)   Wt 70.8 kg (156 lb)   SpO2 97%   BMI 23.04 kg/m    Body mass index is 23.04 kg/m .  Physical Exam   GENERAL: alert and no distress  HENT: ear canals and TM's normal, nose and mouth without ulcers or lesions  RESP: lungs clear to auscultation - no rales, rhonchi or wheezes  CV: regular rate and rhythm, normal S1 S2, no S3 or S4, no murmur, click or rub, no peripheral edema  MS: no gross musculoskeletal defects noted, no edema  PSYCH: mentation appears normal, affect normal/bright  Left radial aspect of wrist with tiny erythematous papules, no signs of surrounding cellulitis.

## 2024-10-23 ENCOUNTER — OFFICE VISIT (OUTPATIENT)
Dept: FAMILY MEDICINE | Facility: CLINIC | Age: 74
End: 2024-10-23

## 2024-10-23 VITALS
TEMPERATURE: 97.9 F | BODY MASS INDEX: 23.04 KG/M2 | HEART RATE: 56 BPM | SYSTOLIC BLOOD PRESSURE: 114 MMHG | OXYGEN SATURATION: 97 % | WEIGHT: 156 LBS | DIASTOLIC BLOOD PRESSURE: 66 MMHG

## 2024-10-23 DIAGNOSIS — L98.9 SKIN LESION: Primary | ICD-10-CM

## 2024-10-23 DIAGNOSIS — J06.9 VIRAL URI: ICD-10-CM

## 2024-10-23 PROCEDURE — G2211 COMPLEX E/M VISIT ADD ON: HCPCS | Performed by: FAMILY MEDICINE

## 2024-10-23 PROCEDURE — 99213 OFFICE O/P EST LOW 20 MIN: CPT | Performed by: FAMILY MEDICINE

## 2024-10-23 NOTE — NURSING NOTE
"Chief Complaint   Patient presents with    Derm Problem     Small red spot on left wrist, he first noticed this 2 weeks ago, at times it becomes \"a pussy pimple\", at first when he would rub it on something it would become irritated     Nasal Congestion     Sinus congestion, discolored mucous      Pre-visit Screening:  Immunizations:  up to date  Colonoscopy:  is up to date  Mammogram: NA  Asthma Action Test/Plan:  nA  PHQ9:  nA  GAD7:  nA  Questioned patient about current smoking habits Pt. has never smoked.  Ok to leave detailed message on voice mail for today's visit only Yes, phone # [phone]    "

## 2024-12-16 DIAGNOSIS — I25.10 CORONARY ARTERY DISEASE INVOLVING NATIVE CORONARY ARTERY OF NATIVE HEART WITHOUT ANGINA PECTORIS: Chronic | ICD-10-CM

## 2024-12-16 RX ORDER — ATORVASTATIN CALCIUM 20 MG/1
20 TABLET, FILM COATED ORAL DAILY
Qty: 90 TABLET | Refills: 3 | Status: SHIPPED | OUTPATIENT
Start: 2024-12-16

## 2025-01-02 ENCOUNTER — TELEPHONE (OUTPATIENT)
Dept: CARDIOLOGY | Facility: CLINIC | Age: 75
End: 2025-01-02
Payer: COMMERCIAL

## 2025-01-02 DIAGNOSIS — I48.0 PAF (PAROXYSMAL ATRIAL FIBRILLATION) (H): ICD-10-CM

## 2025-01-02 NOTE — TELEPHONE ENCOUNTER
[facility] Call Center    Phone Message    May a detailed message be left on voicemail: yes     Reason for Call: Medication Refill Request    Has the patient contacted the pharmacy for the refill? Yes   Name of medication being requested: apixaban ANTICOAGULANT (ELIQUIS ANTICOAGULANT) 5 MG tablet   Provider who prescribed the medication: Dr. Mensah  Pharmacy: [id number] [place] [address] [address]    Date medication is needed: ASAP    Pt states he will be out of his medication after taking his last pill tomorrow 1/3/25. Please fill ASAP. Thank you!    Action Taken: Other: Cardiology    Travel Screening: Not Applicable    Thank you!  [facility]       Date of Service:

## 2025-03-25 ENCOUNTER — OFFICE VISIT (OUTPATIENT)
Dept: FAMILY MEDICINE | Facility: CLINIC | Age: 75
End: 2025-03-25

## 2025-03-25 VITALS
SYSTOLIC BLOOD PRESSURE: 126 MMHG | TEMPERATURE: 97.3 F | OXYGEN SATURATION: 98 % | HEART RATE: 56 BPM | DIASTOLIC BLOOD PRESSURE: 68 MMHG | BODY MASS INDEX: 22.62 KG/M2 | WEIGHT: 153.2 LBS

## 2025-03-25 DIAGNOSIS — M19.012 ARTHRITIS OF LEFT ACROMIOCLAVICULAR JOINT: ICD-10-CM

## 2025-03-25 DIAGNOSIS — M25.512 CHRONIC PAIN IN LEFT SHOULDER: Primary | ICD-10-CM

## 2025-03-25 DIAGNOSIS — G89.29 CHRONIC PAIN IN LEFT SHOULDER: Primary | ICD-10-CM

## 2025-03-25 PROCEDURE — 99213 OFFICE O/P EST LOW 20 MIN: CPT | Mod: 25 | Performed by: STUDENT IN AN ORGANIZED HEALTH CARE EDUCATION/TRAINING PROGRAM

## 2025-03-25 PROCEDURE — 20606 DRAIN/INJ JOINT/BURSA W/US: CPT | Mod: LT | Performed by: STUDENT IN AN ORGANIZED HEALTH CARE EDUCATION/TRAINING PROGRAM

## 2025-03-25 RX ORDER — TRIAMCINOLONE ACETONIDE 40 MG/ML
40 INJECTION, SUSPENSION INTRA-ARTICULAR; INTRAMUSCULAR ONCE
Status: COMPLETED | OUTPATIENT
Start: 2025-03-25 | End: 2025-03-25

## 2025-03-25 RX ORDER — CLOBETASOL PROPIONATE 0.5 MG/G
CREAM TOPICAL
COMMUNITY
Start: 2024-11-20

## 2025-03-25 RX ADMIN — TRIAMCINOLONE ACETONIDE 40 MG: 40 INJECTION, SUSPENSION INTRA-ARTICULAR; INTRAMUSCULAR at 11:00

## 2025-03-25 NOTE — PROGRESS NOTES
ASSESSMENT & PLAN      ICD-10-CM    1. Chronic pain in left shoulder  M25.512 triamcinolone (KENALOG-40) injection 40 mg    G89.29 MA ARTHROCENTESIS ASPIR&/INJ INTERM JT/BURS W/US      2. Arthritis of left acromioclavicular joint  M19.012 triamcinolone (KENALOG-40) injection 40 mg     MA ARTHROCENTESIS ASPIR&/INJ INTERM JT/BURS W/US         Follow-up left shoulder pain. Prior imaging reviewed. Good discussion about tx options and indications for further imaging. Ultimately patient elected to repeat AC joint injection today which was well tolerated and offered immediate pain relief. Encouraged to continue HEP.     Kevin Strange MD, CAM  Primary Care Sports Medicine   -----    SUBJECTIVE:  Sly Morton is a 74 year old male who is seen in follow-up for   Nursing Notes:   Galina Aceves MA  3/25/2025 10:27 AM  Signed  Chief Complaint   Patient presents with    Consult     Possible repeat L shoulder cortisone inj      Pre-visit Screening:  Immunizations:  up to date  Colonoscopy:  up to date  Mammogram: na  Asthma Action Test/Plan:  na  PHQ9:  na  GAD7:  na  Questioned patient about current smoking habits Pt. has never smoked.  Ok to leave detailed message on voice mail for today's visit only yes, phone # [phone] (home)          They were last seen for shoulder on 5/1/24, L AC joint CSI at that time provided good relief for 7-8 months.   Pain has gradually returned.  Some aggravation after playing basketball a few weeks ago  They have also tried rest/activity avoidance, ice, home exercises, and PT.      The patient is seen by themselves.    Patient's past medical, surgical, social, and family histories were reviewed today and no changes are noted.      OBJECTIVE:  /68 (BP Location: Right arm, Patient Position: Sitting, Cuff Size: Adult Regular)   Pulse 56   Temp 97.3  F (36.3  C) (Temporal)   Wt 69.5 kg (153 lb 3.2 oz)   SpO2 98%   BMI 22.62 kg/m     Alert, NAD  NC/AT  Sclerae  anicteric  Regular  Resp nonlabored  Skin warm and dry  No focal neuro deficits. Speech intact. Normal gait.  Appropriate affect    L shoulder AROM 140/140/60/T6, symmetric  5/5 abd, ER, IR, Neg empty can  +ttp AC joint only  CMS intact distally    Imaging:  EXAM: XRAY SHOULDER,2+VWS LEFT  LOCATION: [facility]  DATE: 4/29/2024  INDICATION: Left shoulder pain.  COMPARISON: None.  IMPRESSION: Mild AC joint arthrosis. Glenohumeral joint space is maintained. Bones are demineralized. No  evidence of an acute fracture or dislocation. Tiny ossific density along the anterior aspect of the left humeral  head is chronic in appearance. Coronary artery stenting.  SIGNED BY: Tre Wright MD 4/29/2024 6:10 PM      L Acromioclavicular Injection - Ultrasound Guided  The patient was informed of the risks and the benefits of the procedure and a written consent was signed.  The patient s L acromioclavicular joint was prepped with chlorhexidine in sterile fashion.   20 mg of triamcinolone suspension was drawn up into a 3 mL syringe with 1 mL of 1% lidocaine.  Injection was performed using sterile technique.  Under ultrasound guidance a 5/8-inch 25-gauge needle was used to enter the L acromioclavicular joint.  Needle placement was visualized and documented with ultrasound.  Needle placed in short axis to the probe.  Ultrasound visualization was necessary due to decreased joint space in the setting of osteoarthritis.  Images were permanently stored for the patient's record.  There were no complications. The patient tolerated the procedure well. There was negligible bleeding.   The patient was instructed to ice the shoulder upon leaving clinic and refrain from overuse over the next 3 days.   The patient was instructed to call or go to the emergency room with any unusual pain, swelling, redness, or if otherwise concerned.

## 2025-03-25 NOTE — NURSING NOTE
Chief Complaint   Patient presents with    Consult     Possible repeat L shoulder cortisone inj      Pre-visit Screening:  Immunizations:  up to date  Colonoscopy:  up to date  Mammogram: na  Asthma Action Test/Plan:  na  PHQ9:  na  GAD7:  na  Questioned patient about current smoking habits Pt. has never smoked.  Ok to leave detailed message on voice mail for today's visit only yes, phone # [phone] (home)